# Patient Record
Sex: MALE | Race: WHITE | NOT HISPANIC OR LATINO | URBAN - METROPOLITAN AREA
[De-identification: names, ages, dates, MRNs, and addresses within clinical notes are randomized per-mention and may not be internally consistent; named-entity substitution may affect disease eponyms.]

---

## 2017-02-14 ENCOUNTER — OFFICE VISIT - RIVER FALLS (OUTPATIENT)
Dept: FAMILY MEDICINE | Facility: CLINIC | Age: 42
End: 2017-02-14

## 2017-02-14 ASSESSMENT — MIFFLIN-ST. JEOR: SCORE: 2198.96

## 2017-05-08 ENCOUNTER — COMMUNICATION - RIVER FALLS (OUTPATIENT)
Dept: FAMILY MEDICINE | Facility: CLINIC | Age: 42
End: 2017-05-08

## 2017-05-08 ENCOUNTER — OFFICE VISIT - RIVER FALLS (OUTPATIENT)
Dept: FAMILY MEDICINE | Facility: CLINIC | Age: 42
End: 2017-05-08

## 2017-05-19 ENCOUNTER — OFFICE VISIT - RIVER FALLS (OUTPATIENT)
Dept: FAMILY MEDICINE | Facility: CLINIC | Age: 42
End: 2017-05-19

## 2017-05-19 ENCOUNTER — COMMUNICATION - RIVER FALLS (OUTPATIENT)
Dept: FAMILY MEDICINE | Facility: CLINIC | Age: 42
End: 2017-05-19

## 2017-05-19 LAB
CREAT SERPL-MCNC: 0.93 MG/DL (ref 0.72–1.25)
GLUCOSE BLD-MCNC: 104 MG/DL (ref 65–100)

## 2017-05-19 ASSESSMENT — MIFFLIN-ST. JEOR: SCORE: 2147.25

## 2017-06-28 ENCOUNTER — OFFICE VISIT - RIVER FALLS (OUTPATIENT)
Dept: FAMILY MEDICINE | Facility: CLINIC | Age: 42
End: 2017-06-28

## 2017-08-02 ENCOUNTER — OFFICE VISIT - RIVER FALLS (OUTPATIENT)
Dept: FAMILY MEDICINE | Facility: CLINIC | Age: 42
End: 2017-08-02

## 2017-08-02 ASSESSMENT — MIFFLIN-ST. JEOR: SCORE: 2149.06

## 2017-08-30 ENCOUNTER — OFFICE VISIT - RIVER FALLS (OUTPATIENT)
Dept: FAMILY MEDICINE | Facility: CLINIC | Age: 42
End: 2017-08-30

## 2017-09-05 ENCOUNTER — OFFICE VISIT - RIVER FALLS (OUTPATIENT)
Dept: FAMILY MEDICINE | Facility: CLINIC | Age: 42
End: 2017-09-05

## 2017-09-05 ASSESSMENT — MIFFLIN-ST. JEOR: SCORE: 2147.25

## 2017-09-19 ENCOUNTER — OFFICE VISIT - RIVER FALLS (OUTPATIENT)
Dept: FAMILY MEDICINE | Facility: CLINIC | Age: 42
End: 2017-09-19

## 2017-09-19 ASSESSMENT — MIFFLIN-ST. JEOR: SCORE: 2147.25

## 2017-10-10 ENCOUNTER — OFFICE VISIT - RIVER FALLS (OUTPATIENT)
Dept: FAMILY MEDICINE | Facility: CLINIC | Age: 42
End: 2017-10-10

## 2017-10-10 ASSESSMENT — MIFFLIN-ST. JEOR: SCORE: 2147.25

## 2017-12-07 ENCOUNTER — OFFICE VISIT - RIVER FALLS (OUTPATIENT)
Dept: FAMILY MEDICINE | Facility: CLINIC | Age: 42
End: 2017-12-07

## 2017-12-07 ASSESSMENT — MIFFLIN-ST. JEOR: SCORE: 2139.08

## 2018-03-05 ENCOUNTER — OFFICE VISIT - RIVER FALLS (OUTPATIENT)
Dept: FAMILY MEDICINE | Facility: CLINIC | Age: 43
End: 2018-03-05

## 2018-03-05 ASSESSMENT — MIFFLIN-ST. JEOR: SCORE: 2179

## 2018-05-01 ENCOUNTER — OFFICE VISIT - RIVER FALLS (OUTPATIENT)
Dept: FAMILY MEDICINE | Facility: CLINIC | Age: 43
End: 2018-05-01

## 2018-05-01 ASSESSMENT — MIFFLIN-ST. JEOR: SCORE: 2191.7

## 2018-06-28 ENCOUNTER — OFFICE VISIT - RIVER FALLS (OUTPATIENT)
Dept: FAMILY MEDICINE | Facility: CLINIC | Age: 43
End: 2018-06-28

## 2018-06-28 ASSESSMENT — MIFFLIN-ST. JEOR: SCORE: 2207.12

## 2018-10-01 ENCOUNTER — OFFICE VISIT - RIVER FALLS (OUTPATIENT)
Dept: FAMILY MEDICINE | Facility: CLINIC | Age: 43
End: 2018-10-01

## 2018-10-01 ASSESSMENT — MIFFLIN-ST. JEOR: SCORE: 2207.12

## 2018-12-06 ENCOUNTER — OFFICE VISIT - RIVER FALLS (OUTPATIENT)
Dept: FAMILY MEDICINE | Facility: CLINIC | Age: 43
End: 2018-12-06

## 2018-12-06 ASSESSMENT — MIFFLIN-ST. JEOR: SCORE: 2244.32

## 2019-04-09 ENCOUNTER — OFFICE VISIT - RIVER FALLS (OUTPATIENT)
Dept: FAMILY MEDICINE | Facility: CLINIC | Age: 44
End: 2019-04-09

## 2019-04-09 ASSESSMENT — MIFFLIN-ST. JEOR: SCORE: 2244.32

## 2019-04-10 LAB
A/G RATIO - HISTORICAL: 1.4 (ref 1–2.5)
ALBUMIN SERPL-MCNC: 4.1 GM/DL (ref 3.6–5.1)
ALP SERPL-CCNC: 62 UNIT/L (ref 40–115)
ALT SERPL W P-5'-P-CCNC: 12 UNIT/L (ref 9–46)
AST SERPL W P-5'-P-CCNC: 16 UNIT/L (ref 10–40)
BILIRUB SERPL-MCNC: 0.8 MG/DL (ref 0.2–1.2)
BUN SERPL-MCNC: 12 MG/DL (ref 7–25)
BUN/CREAT RATIO - HISTORICAL: NORMAL (ref 6–22)
CALCIUM SERPL-MCNC: 9.5 MG/DL (ref 8.6–10.3)
CHLORIDE BLD-SCNC: 104 MMOL/L (ref 98–110)
CHOLEST SERPL-MCNC: 160 MG/DL
CHOLEST/HDLC SERPL: 4.8 {RATIO}
CO2 SERPL-SCNC: 23 MMOL/L (ref 20–32)
CREAT SERPL-MCNC: 0.88 MG/DL (ref 0.6–1.35)
EGFRCR SERPLBLD CKD-EPI 2021: 105 ML/MIN/1.73M2
ERYTHROCYTE [DISTWIDTH] IN BLOOD BY AUTOMATED COUNT: 16.1 % (ref 11–15)
GLOBULIN: 2.9 (ref 1.9–3.7)
GLUCOSE BLD-MCNC: 93 MG/DL (ref 65–99)
HBA1C MFR BLD: 5.4 %
HCT VFR BLD AUTO: 49.6 % (ref 38.5–50)
HDLC SERPL-MCNC: 33 MG/DL
HGB BLD-MCNC: 15.3 GM/DL (ref 13.2–17.1)
LDLC SERPL CALC-MCNC: 97 MG/DL
MCH RBC QN AUTO: 24.8 PG (ref 27–33)
MCHC RBC AUTO-ENTMCNC: 30.8 GM/DL (ref 32–36)
MCV RBC AUTO: 80.5 FL (ref 80–100)
NONHDLC SERPL-MCNC: 127 MG/DL
PLATELET # BLD AUTO: 350 10*3/UL (ref 140–400)
PMV BLD: 10.7 FL (ref 7.5–12.5)
POTASSIUM BLD-SCNC: 4.5 MMOL/L (ref 3.5–5.3)
PROT SERPL-MCNC: 7 GM/DL (ref 6.1–8.1)
RBC # BLD AUTO: 6.16 10*6/UL (ref 4.2–5.8)
SODIUM SERPL-SCNC: 137 MMOL/L (ref 135–146)
TRIGL SERPL-MCNC: 205 MG/DL
TSH SERPL DL<=0.005 MIU/L-ACNC: 3.83 MIU/L (ref 0.4–4.5)
WBC # BLD AUTO: 7.3 10*3/UL (ref 3.8–10.8)

## 2019-06-13 ENCOUNTER — OFFICE VISIT - RIVER FALLS (OUTPATIENT)
Dept: FAMILY MEDICINE | Facility: CLINIC | Age: 44
End: 2019-06-13

## 2019-06-13 ASSESSMENT — MIFFLIN-ST. JEOR: SCORE: 2193.52

## 2019-07-01 ENCOUNTER — COMMUNICATION - RIVER FALLS (OUTPATIENT)
Dept: FAMILY MEDICINE | Facility: CLINIC | Age: 44
End: 2019-07-01

## 2019-07-22 ENCOUNTER — COMMUNICATION - RIVER FALLS (OUTPATIENT)
Dept: FAMILY MEDICINE | Facility: CLINIC | Age: 44
End: 2019-07-22

## 2019-07-29 ENCOUNTER — COMMUNICATION - RIVER FALLS (OUTPATIENT)
Dept: FAMILY MEDICINE | Facility: CLINIC | Age: 44
End: 2019-07-29

## 2019-08-28 ENCOUNTER — OFFICE VISIT - RIVER FALLS (OUTPATIENT)
Dept: FAMILY MEDICINE | Facility: CLINIC | Age: 44
End: 2019-08-28

## 2019-08-28 ASSESSMENT — MIFFLIN-ST. JEOR: SCORE: 2192.61

## 2019-09-05 ENCOUNTER — OFFICE VISIT - RIVER FALLS (OUTPATIENT)
Dept: FAMILY MEDICINE | Facility: CLINIC | Age: 44
End: 2019-09-05

## 2019-09-05 ASSESSMENT — MIFFLIN-ST. JEOR: SCORE: 2192.61

## 2019-11-01 ENCOUNTER — COMMUNICATION - RIVER FALLS (OUTPATIENT)
Dept: FAMILY MEDICINE | Facility: CLINIC | Age: 44
End: 2019-11-01

## 2019-11-07 ENCOUNTER — AMBULATORY - RIVER FALLS (OUTPATIENT)
Dept: FAMILY MEDICINE | Facility: CLINIC | Age: 44
End: 2019-11-07

## 2019-11-08 LAB
A/G RATIO - HISTORICAL: 1.4 (ref 1–2.5)
ALBUMIN SERPL-MCNC: 4.2 GM/DL (ref 3.6–5.1)
ALP SERPL-CCNC: 70 UNIT/L (ref 40–115)
ALT SERPL W P-5'-P-CCNC: 15 UNIT/L (ref 9–46)
AST SERPL W P-5'-P-CCNC: 15 UNIT/L (ref 10–40)
BILIRUB SERPL-MCNC: 1 MG/DL (ref 0.2–1.2)
BUN SERPL-MCNC: 12 MG/DL (ref 7–25)
BUN/CREAT RATIO - HISTORICAL: NORMAL (ref 6–22)
CALCIUM SERPL-MCNC: 9.2 MG/DL (ref 8.6–10.3)
CHLORIDE BLD-SCNC: 100 MMOL/L (ref 98–110)
CHOLEST SERPL-MCNC: 119 MG/DL
CHOLEST/HDLC SERPL: 3.1 {RATIO}
CO2 SERPL-SCNC: 27 MMOL/L (ref 20–32)
CREAT SERPL-MCNC: 1.05 MG/DL (ref 0.6–1.35)
EGFRCR SERPLBLD CKD-EPI 2021: 87 ML/MIN/1.73M2
ERYTHROCYTE [DISTWIDTH] IN BLOOD BY AUTOMATED COUNT: 13.9 % (ref 11–15)
GLOBULIN: 3 (ref 1.9–3.7)
GLUCOSE BLD-MCNC: 99 MG/DL (ref 65–99)
HBA1C MFR BLD: 5.9 %
HCT VFR BLD AUTO: 52.7 % (ref 38.5–50)
HDLC SERPL-MCNC: 39 MG/DL
HGB BLD-MCNC: 17.2 GM/DL (ref 13.2–17.1)
LDLC SERPL CALC-MCNC: 60 MG/DL
MCH RBC QN AUTO: 27.6 PG (ref 27–33)
MCHC RBC AUTO-ENTMCNC: 32.6 GM/DL (ref 32–36)
MCV RBC AUTO: 84.5 FL (ref 80–100)
NONHDLC SERPL-MCNC: 80 MG/DL
PLATELET # BLD AUTO: 356 10*3/UL (ref 140–400)
PMV BLD: 10.4 FL (ref 7.5–12.5)
POTASSIUM BLD-SCNC: 4.5 MMOL/L (ref 3.5–5.3)
PROT SERPL-MCNC: 7.2 GM/DL (ref 6.1–8.1)
RBC # BLD AUTO: 6.24 10*6/UL (ref 4.2–5.8)
SODIUM SERPL-SCNC: 139 MMOL/L (ref 135–146)
TRIGL SERPL-MCNC: 118 MG/DL
TSH SERPL DL<=0.005 MIU/L-ACNC: 4.11 MIU/L (ref 0.4–4.5)
WBC # BLD AUTO: 8.2 10*3/UL (ref 3.8–10.8)

## 2019-11-11 ENCOUNTER — COMMUNICATION - RIVER FALLS (OUTPATIENT)
Dept: FAMILY MEDICINE | Facility: CLINIC | Age: 44
End: 2019-11-11

## 2019-11-18 ENCOUNTER — OFFICE VISIT - RIVER FALLS (OUTPATIENT)
Dept: FAMILY MEDICINE | Facility: CLINIC | Age: 44
End: 2019-11-18

## 2019-11-18 ASSESSMENT — MIFFLIN-ST. JEOR: SCORE: 2192.61

## 2019-12-09 ENCOUNTER — OFFICE VISIT - RIVER FALLS (OUTPATIENT)
Dept: FAMILY MEDICINE | Facility: CLINIC | Age: 44
End: 2019-12-09

## 2019-12-09 ASSESSMENT — MIFFLIN-ST. JEOR: SCORE: 2192.61

## 2019-12-15 ENCOUNTER — AMBULATORY - RIVER FALLS (OUTPATIENT)
Dept: FAMILY MEDICINE | Facility: CLINIC | Age: 44
End: 2019-12-15

## 2020-01-15 ENCOUNTER — COMMUNICATION - RIVER FALLS (OUTPATIENT)
Dept: FAMILY MEDICINE | Facility: CLINIC | Age: 45
End: 2020-01-15

## 2020-04-23 ENCOUNTER — OFFICE VISIT - RIVER FALLS (OUTPATIENT)
Dept: FAMILY MEDICINE | Facility: CLINIC | Age: 45
End: 2020-04-23

## 2020-08-10 ENCOUNTER — COMMUNICATION - RIVER FALLS (OUTPATIENT)
Dept: FAMILY MEDICINE | Facility: CLINIC | Age: 45
End: 2020-08-10

## 2020-08-12 ENCOUNTER — OFFICE VISIT - RIVER FALLS (OUTPATIENT)
Dept: FAMILY MEDICINE | Facility: CLINIC | Age: 45
End: 2020-08-12

## 2020-08-13 LAB
A/G RATIO - HISTORICAL: 1.4 (ref 1–2.5)
ALBUMIN SERPL-MCNC: 4.1 GM/DL (ref 3.6–5.1)
ALP SERPL-CCNC: 74 UNIT/L (ref 36–130)
ALT SERPL W P-5'-P-CCNC: 19 UNIT/L (ref 9–46)
AST SERPL W P-5'-P-CCNC: 16 UNIT/L (ref 10–40)
BILIRUB SERPL-MCNC: 1 MG/DL (ref 0.2–1.2)
BUN SERPL-MCNC: 12 MG/DL (ref 7–25)
BUN/CREAT RATIO - HISTORICAL: NORMAL (ref 6–22)
CALCIUM SERPL-MCNC: 9.1 MG/DL (ref 8.6–10.3)
CHLORIDE BLD-SCNC: 105 MMOL/L (ref 98–110)
CHOLEST SERPL-MCNC: 100 MG/DL
CHOLEST/HDLC SERPL: 2.4 {RATIO}
CO2 SERPL-SCNC: 26 MMOL/L (ref 20–32)
CREAT SERPL-MCNC: 0.96 MG/DL (ref 0.6–1.35)
EGFRCR SERPLBLD CKD-EPI 2021: 96 ML/MIN/1.73M2
ERYTHROCYTE [DISTWIDTH] IN BLOOD BY AUTOMATED COUNT: 14.3 % (ref 11–15)
GLOBULIN: 2.9 (ref 1.9–3.7)
GLUCOSE BLD-MCNC: 98 MG/DL (ref 65–99)
HBA1C MFR BLD: 5.6 %
HCT VFR BLD AUTO: 51.3 % (ref 38.5–50)
HDLC SERPL-MCNC: 42 MG/DL
HGB BLD-MCNC: 16.2 GM/DL (ref 13.2–17.1)
LDLC SERPL CALC-MCNC: 38 MG/DL
MCH RBC QN AUTO: 27.1 PG (ref 27–33)
MCHC RBC AUTO-ENTMCNC: 31.6 GM/DL (ref 32–36)
MCV RBC AUTO: 85.8 FL (ref 80–100)
NONHDLC SERPL-MCNC: 58 MG/DL
PLATELET # BLD AUTO: 342 10*3/UL (ref 140–400)
PMV BLD: 10.3 FL (ref 7.5–12.5)
POTASSIUM BLD-SCNC: 4.5 MMOL/L (ref 3.5–5.3)
PROT SERPL-MCNC: 7 GM/DL (ref 6.1–8.1)
RBC # BLD AUTO: 5.98 10*6/UL (ref 4.2–5.8)
SODIUM SERPL-SCNC: 139 MMOL/L (ref 135–146)
TESTOSTERONE TOTAL: 1123 NG/DL (ref 250–827)
TRIGL SERPL-MCNC: 113 MG/DL
TSH SERPL DL<=0.005 MIU/L-ACNC: 3.78 MIU/L (ref 0.4–4.5)
WBC # BLD AUTO: 7.4 10*3/UL (ref 3.8–10.8)

## 2020-08-17 ENCOUNTER — COMMUNICATION - RIVER FALLS (OUTPATIENT)
Dept: FAMILY MEDICINE | Facility: CLINIC | Age: 45
End: 2020-08-17

## 2020-10-21 ENCOUNTER — OFFICE VISIT - RIVER FALLS (OUTPATIENT)
Dept: FAMILY MEDICINE | Facility: CLINIC | Age: 45
End: 2020-10-21

## 2020-11-09 ENCOUNTER — COMMUNICATION - RIVER FALLS (OUTPATIENT)
Dept: FAMILY MEDICINE | Facility: CLINIC | Age: 45
End: 2020-11-09

## 2020-12-14 ENCOUNTER — OFFICE VISIT - RIVER FALLS (OUTPATIENT)
Dept: FAMILY MEDICINE | Facility: CLINIC | Age: 45
End: 2020-12-14

## 2020-12-14 ASSESSMENT — MIFFLIN-ST. JEOR: SCORE: 2192.61

## 2021-02-01 ENCOUNTER — COMMUNICATION - RIVER FALLS (OUTPATIENT)
Dept: FAMILY MEDICINE | Facility: CLINIC | Age: 46
End: 2021-02-01

## 2021-02-10 ENCOUNTER — AMBULATORY - RIVER FALLS (OUTPATIENT)
Dept: FAMILY MEDICINE | Facility: CLINIC | Age: 46
End: 2021-02-10

## 2021-02-11 ENCOUNTER — COMMUNICATION - RIVER FALLS (OUTPATIENT)
Dept: FAMILY MEDICINE | Facility: CLINIC | Age: 46
End: 2021-02-11

## 2021-02-11 LAB
A/G RATIO - HISTORICAL: 1.4 (ref 1–2.5)
ALBUMIN SERPL-MCNC: 4.4 GM/DL (ref 3.6–5.1)
ALP SERPL-CCNC: 91 UNIT/L (ref 36–130)
ALT SERPL W P-5'-P-CCNC: 26 UNIT/L (ref 9–46)
AST SERPL W P-5'-P-CCNC: 19 UNIT/L (ref 10–40)
BILIRUB SERPL-MCNC: 1.1 MG/DL (ref 0.2–1.2)
BUN SERPL-MCNC: 16 MG/DL (ref 7–25)
BUN/CREAT RATIO - HISTORICAL: NORMAL (ref 6–22)
CALCIUM SERPL-MCNC: 9.6 MG/DL (ref 8.6–10.3)
CHLORIDE BLD-SCNC: 100 MMOL/L (ref 98–110)
CHOLEST SERPL-MCNC: 138 MG/DL
CHOLEST/HDLC SERPL: 2.8 {RATIO}
CO2 SERPL-SCNC: 30 MMOL/L (ref 20–32)
CREAT SERPL-MCNC: 1.13 MG/DL (ref 0.6–1.35)
EGFRCR SERPLBLD CKD-EPI 2021: 78 ML/MIN/1.73M2
ERYTHROCYTE [DISTWIDTH] IN BLOOD BY AUTOMATED COUNT: 14.7 % (ref 11–15)
GLOBULIN: 3.1 (ref 1.9–3.7)
GLUCOSE BLD-MCNC: 94 MG/DL (ref 65–99)
HCT VFR BLD AUTO: 48 % (ref 38.5–50)
HDLC SERPL-MCNC: 49 MG/DL
HGB BLD-MCNC: 16.4 GM/DL (ref 13.2–17.1)
LDLC SERPL CALC-MCNC: 68 MG/DL
MCH RBC QN AUTO: 28.8 PG (ref 27–33)
MCHC RBC AUTO-ENTMCNC: 34.2 GM/DL (ref 32–36)
MCV RBC AUTO: 84.2 FL (ref 80–100)
NONHDLC SERPL-MCNC: 89 MG/DL
PLATELET # BLD AUTO: 332 10*3/UL (ref 140–400)
PMV BLD: 9.8 FL (ref 7.5–12.5)
POTASSIUM BLD-SCNC: 4.5 MMOL/L (ref 3.5–5.3)
PROT SERPL-MCNC: 7.5 GM/DL (ref 6.1–8.1)
RBC # BLD AUTO: 5.7 10*6/UL (ref 4.2–5.8)
SODIUM SERPL-SCNC: 138 MMOL/L (ref 135–146)
TESTOSTERONE TOTAL: 191 NG/DL (ref 250–827)
TRIGL SERPL-MCNC: 120 MG/DL
TSH SERPL DL<=0.005 MIU/L-ACNC: 3.16 MIU/L (ref 0.4–4.5)
WBC # BLD AUTO: 6.6 10*3/UL (ref 3.8–10.8)

## 2021-02-16 LAB
CREAT UR-MCNC: 137 MG/DL (ref 20–320)
MICROALBUMIN UR-MCNC: 0.3 MG/DL
MICROALBUMIN/CREAT UR: 2 MG/G{CREAT}

## 2021-02-18 ENCOUNTER — OFFICE VISIT - RIVER FALLS (OUTPATIENT)
Dept: FAMILY MEDICINE | Facility: CLINIC | Age: 46
End: 2021-02-18

## 2021-02-18 ASSESSMENT — MIFFLIN-ST. JEOR: SCORE: 2192.61

## 2021-04-28 ENCOUNTER — COMMUNICATION - RIVER FALLS (OUTPATIENT)
Dept: FAMILY MEDICINE | Facility: CLINIC | Age: 46
End: 2021-04-28

## 2022-02-12 VITALS
DIASTOLIC BLOOD PRESSURE: 88 MMHG | SYSTOLIC BLOOD PRESSURE: 130 MMHG | BODY MASS INDEX: 41.26 KG/M2 | WEIGHT: 288.2 LBS | HEART RATE: 93 BPM | HEIGHT: 70 IN | OXYGEN SATURATION: 98 %

## 2022-02-12 VITALS
BODY MASS INDEX: 39.8 KG/M2 | HEIGHT: 70 IN | WEIGHT: 278 LBS | SYSTOLIC BLOOD PRESSURE: 128 MMHG | BODY MASS INDEX: 39.8 KG/M2 | TEMPERATURE: 97.7 F | WEIGHT: 278 LBS | DIASTOLIC BLOOD PRESSURE: 76 MMHG | HEIGHT: 70 IN | HEIGHT: 70 IN | SYSTOLIC BLOOD PRESSURE: 134 MMHG | BODY MASS INDEX: 39.8 KG/M2 | HEART RATE: 72 BPM | HEART RATE: 110 BPM | OXYGEN SATURATION: 97 % | WEIGHT: 278 LBS | TEMPERATURE: 97.6 F | HEART RATE: 84 BPM | TEMPERATURE: 98.1 F | DIASTOLIC BLOOD PRESSURE: 82 MMHG

## 2022-02-12 VITALS
WEIGHT: 287.8 LBS | HEIGHT: 70 IN | BODY MASS INDEX: 41.2 KG/M2 | HEIGHT: 70 IN | DIASTOLIC BLOOD PRESSURE: 72 MMHG | HEART RATE: 78 BPM | HEART RATE: 83 BPM | BODY MASS INDEX: 40.8 KG/M2 | SYSTOLIC BLOOD PRESSURE: 110 MMHG | SYSTOLIC BLOOD PRESSURE: 124 MMHG | WEIGHT: 285 LBS | DIASTOLIC BLOOD PRESSURE: 70 MMHG

## 2022-02-12 VITALS
BODY MASS INDEX: 42.86 KG/M2 | DIASTOLIC BLOOD PRESSURE: 84 MMHG | HEIGHT: 70 IN | HEART RATE: 111 BPM | OXYGEN SATURATION: 96 % | SYSTOLIC BLOOD PRESSURE: 128 MMHG | WEIGHT: 299.4 LBS

## 2022-02-12 VITALS
SYSTOLIC BLOOD PRESSURE: 128 MMHG | DIASTOLIC BLOOD PRESSURE: 72 MMHG | HEART RATE: 92 BPM | TEMPERATURE: 98.5 F | SYSTOLIC BLOOD PRESSURE: 126 MMHG | TEMPERATURE: 99 F | OXYGEN SATURATION: 98 % | HEIGHT: 70 IN | HEART RATE: 88 BPM | SYSTOLIC BLOOD PRESSURE: 120 MMHG | BODY MASS INDEX: 39.89 KG/M2 | HEART RATE: 72 BPM | WEIGHT: 277.8 LBS | WEIGHT: 278 LBS | DIASTOLIC BLOOD PRESSURE: 86 MMHG | BODY MASS INDEX: 39.86 KG/M2 | DIASTOLIC BLOOD PRESSURE: 66 MMHG | WEIGHT: 278.4 LBS

## 2022-02-12 VITALS
HEIGHT: 70 IN | DIASTOLIC BLOOD PRESSURE: 86 MMHG | HEART RATE: 125 BPM | WEIGHT: 288 LBS | BODY MASS INDEX: 41.23 KG/M2 | SYSTOLIC BLOOD PRESSURE: 138 MMHG | DIASTOLIC BLOOD PRESSURE: 78 MMHG | WEIGHT: 288 LBS | HEIGHT: 70 IN | BODY MASS INDEX: 41.23 KG/M2 | HEART RATE: 107 BPM | SYSTOLIC BLOOD PRESSURE: 126 MMHG | BODY MASS INDEX: 41.23 KG/M2 | OXYGEN SATURATION: 90 % | HEIGHT: 70 IN | SYSTOLIC BLOOD PRESSURE: 126 MMHG | DIASTOLIC BLOOD PRESSURE: 88 MMHG | HEART RATE: 128 BPM | WEIGHT: 288 LBS | DIASTOLIC BLOOD PRESSURE: 86 MMHG | OXYGEN SATURATION: 98 % | HEART RATE: 104 BPM | OXYGEN SATURATION: 96 % | SYSTOLIC BLOOD PRESSURE: 148 MMHG

## 2022-02-12 VITALS
BODY MASS INDEX: 41.43 KG/M2 | SYSTOLIC BLOOD PRESSURE: 142 MMHG | WEIGHT: 289.4 LBS | HEIGHT: 70 IN | DIASTOLIC BLOOD PRESSURE: 90 MMHG | HEART RATE: 112 BPM

## 2022-02-12 VITALS
HEART RATE: 76 BPM | SYSTOLIC BLOOD PRESSURE: 130 MMHG | TEMPERATURE: 98.3 F | DIASTOLIC BLOOD PRESSURE: 74 MMHG | BODY MASS INDEX: 40.06 KG/M2 | BODY MASS INDEX: 39.8 KG/M2 | SYSTOLIC BLOOD PRESSURE: 128 MMHG | WEIGHT: 278 LBS | HEIGHT: 70 IN | HEART RATE: 96 BPM | WEIGHT: 279.2 LBS | DIASTOLIC BLOOD PRESSURE: 90 MMHG

## 2022-02-12 VITALS
DIASTOLIC BLOOD PRESSURE: 84 MMHG | SYSTOLIC BLOOD PRESSURE: 138 MMHG | WEIGHT: 288 LBS | BODY MASS INDEX: 41.23 KG/M2 | HEIGHT: 70 IN | OXYGEN SATURATION: 96 % | HEART RATE: 89 BPM

## 2022-02-12 VITALS
DIASTOLIC BLOOD PRESSURE: 88 MMHG | SYSTOLIC BLOOD PRESSURE: 144 MMHG | HEART RATE: 91 BPM | WEIGHT: 288 LBS | OXYGEN SATURATION: 97 % | BODY MASS INDEX: 41.23 KG/M2 | HEIGHT: 70 IN | TEMPERATURE: 98.1 F

## 2022-02-12 VITALS
SYSTOLIC BLOOD PRESSURE: 132 MMHG | HEART RATE: 112 BPM | DIASTOLIC BLOOD PRESSURE: 88 MMHG | BODY MASS INDEX: 41.23 KG/M2 | WEIGHT: 288 LBS | HEIGHT: 70 IN | OXYGEN SATURATION: 96 %

## 2022-02-12 VITALS
HEIGHT: 70 IN | WEIGHT: 291.2 LBS | DIASTOLIC BLOOD PRESSURE: 86 MMHG | OXYGEN SATURATION: 97 % | SYSTOLIC BLOOD PRESSURE: 144 MMHG | HEART RATE: 93 BPM | BODY MASS INDEX: 41.69 KG/M2

## 2022-02-12 VITALS — HEIGHT: 70 IN | BODY MASS INDEX: 41.32 KG/M2

## 2022-02-12 VITALS
DIASTOLIC BLOOD PRESSURE: 88 MMHG | WEIGHT: 291.2 LBS | SYSTOLIC BLOOD PRESSURE: 138 MMHG | HEART RATE: 90 BPM | TEMPERATURE: 98.2 F | HEIGHT: 70 IN | BODY MASS INDEX: 41.69 KG/M2

## 2022-02-12 VITALS — BODY MASS INDEX: 42.86 KG/M2 | OXYGEN SATURATION: 97 % | HEIGHT: 70 IN | WEIGHT: 299.4 LBS | HEART RATE: 86 BPM

## 2022-02-12 VITALS
HEIGHT: 70 IN | HEART RATE: 91 BPM | SYSTOLIC BLOOD PRESSURE: 132 MMHG | WEIGHT: 276.2 LBS | DIASTOLIC BLOOD PRESSURE: 86 MMHG | BODY MASS INDEX: 39.54 KG/M2 | OXYGEN SATURATION: 99 %

## 2022-02-12 VITALS — SYSTOLIC BLOOD PRESSURE: 142 MMHG | HEIGHT: 70 IN | DIASTOLIC BLOOD PRESSURE: 88 MMHG | BODY MASS INDEX: 41.78 KG/M2

## 2022-02-12 VITALS — BODY MASS INDEX: 41.32 KG/M2 | HEIGHT: 70 IN

## 2022-02-15 NOTE — PROGRESS NOTES
Patient:   PEABODY, TIMOTHY J            MRN: [id number]            FIN: [id number]               Age:   44 years     Sex:  Male     :  1975   Associated Diagnoses:   Chronic pain; Hypothyroidism; Insomnia; Low testosterone in male; Migraine Headache; Moderate major depression; Type 2 diabetes mellitus   Author:   Floyd Baker MD      Impression and Plan   Diagnosis     Chronic pain (ELN97-BH G89.29).     Hypothyroidism (PVM76-OW E03.9).     Insomnia (AHI69-YI G47.00).     Low testosterone in male (IMF13-NS R79.89).     Migraine Headache (JGU95-QG G43.909).     Moderate major depression (GYV32-XK F32.1).     Type 2 diabetes mellitus (MPX50-US E11.9).     Course:  Improving.    Plan:    1. Decrease dose of Testosterone due to previous high level and recheck level in 2 months  2. Increase dose of Amitriptyline due to poor sleep  3. Patient reports pain clinic will be placing a spine stimulator.    Orders     Orders   Charges (Evaluation and Management):  19507 office outpatient visit 15 minutes (Charge) (Order): Quantity: 1, Migraine Headache  Moderate major depression  Type 2 diabetes mellitus  Insomnia  Chronic pain  Hypothyroidism  Low testosterone in male.     Orders (Selected)   Prescriptions  Prescribed  DULoxetine 60 mg oral delayed release capsule: = 2 cap(s) ( 120 mg ), Oral, daily, # 180 cap(s), 1 Refill(s), Type: Maintenance, Pharmacy: [facility], 2 cap(s) Oral daily, 70, in, 10/21/20 9:37:00 CDT, Height Measured, 288, lb, 19 13:24:00 CST, Weight Measured  Testosterone Cypionate 200 mg/mL intramuscular solution: See Instructions, Instructions: Inject intramuscular 0.5 milliliter  ONCE weekly, # 10 mL, 1 Refill(s), Type: Soft Stop, Pharmacy: [facility], Inject intramuscular 0.5 milliliter; ONCE weekly, 70, in, 10/21/20 9:37:00 CDT, Height Measured, 288...  amitriptyline 25 mg oral tablet: = 3 tab(s) ( 75 mg ), PO, hs, # 90 tab(s), 5 Refill(s), Type: Maintenance, Pharmacy:  [facility], 3 tab(s) Oral hs, 70, in, 10/21/20 9:37:00 CDT, Height Measured, 288, lb, 12/09/19 13:24:00 CST, Weight Measured  atorvastatin 20 mg oral tablet: = 1 tab(s) ( 20 mg ), PO, Daily, # 90 tab(s), 1 Refill(s), Type: Maintenance, Pharmacy: [facility], 1 tab(s) Oral daily, 70, in, 10/21/20 9:37:00 CDT, Height Measured, 288, lb, 12/09/19 13:24:00 CST, Weight Measured  tiZANidine 4 mg oral tablet: = 1 tab(s) ( 4 mg ), Oral, bid, # 60 tab(s), 1 Refill(s), Type: Maintenance, Pharmacy: [facility], 1 tab(s) Oral bid, 70, in, 10/21/20 9:37:00 CDT, Height Measured, 288, lb, 12/09/19 13:24:00 CST, Weight Measured.        Visit Information      Date of Service: 10/21/2020 06:42 am  Performing Location: [facility]  Encounter#: [id number]      Primary Care Provider (PCP):  Floyd Baker MD# [id number]      Referring Provider:  Floyd Baker MD# [id number]   Visit type:  Video Visit via [employer].    Participants in room during visit:  _Patient only   Accompanied by:  No one.    Source of history:  Self.    Location of patient:  _home  Location of provider:  _ Clinic  Video Start Time:  _1000  Video End Time:   _1015    Today's visit was conducted via video conference due to the COVID-19 pandemic.  The patient's consent to proceed with a video visit has been obtained and documented.   Referral source:  Self.    History limitation:  None.       Chief Complaint   10/21/2020 9:37 AM CDT   consent for video visit for med ck        History of Present Illness   Patient is a _44 year old _M who is being evaluated via a billable video visit.  Patient needs refills of several medications.  He is not sleeping well so Amitriptyline is increased to 75 mg.  His last testosterone level was above the normal range so his dose is decreased and he will recheck the level in two months.  He is going to a pain clinic and will be given a trial of a spinal stimulator.  His depression is well  controlled currently.  Diabetes is well controlled.  Hypothyroidism is well controlled.  Migraines are well controlled.  Cholesterol is well controlled.      Review of Systems   Constitutional:  Negative.    Eye:  Negative.    Ear/Nose/Mouth/Throat:  Negative.    Respiratory:  Negative.    Cardiovascular:  Negative.    Gastrointestinal:  Negative.    Genitourinary:  Negative.    Immunologic:  Negative.    Musculoskeletal:  Negative.    Integumentary:  Negative.    Neurologic:  Negative.    Psychiatric:  Negative.       Health Status   Allergies:    Nonallergic Reactions (Selected)  Severe  Doxycycline (Vomiting)   Medications:  (Selected)   Prescriptions  Prescribed  DULoxetine 60 mg oral delayed release capsule: = 2 cap(s) ( 120 mg ), Oral, daily, # 180 cap(s), 1 Refill(s), Type: Maintenance, Pharmacy: [facility], 2 cap(s) Oral daily, 70, in, 10/21/20 9:37:00 CDT, Height Measured, 288, lb, 12/09/19 13:24:00 CST, Weight Measured  SUMAtriptan 100 mg oral tablet: = 1 tab(s) ( 100 mg ), PO, Once, PRN: for migraine headache, # 9 tab(s), 5 Refill(s), Type: Soft Stop, Pharmacy: [facility], 1 tab(s) Oral once,PRN:for migraine headache  Testosterone Cypionate 200 mg/mL intramuscular solution: See Instructions, Instructions: Inject intramuscular 0.5 milliliter  ONCE weekly, # 10 mL, 1 Refill(s), Type: Soft Stop, Pharmacy: [facility], Inject intramuscular 0.5 milliliter; ONCE weekly, 70, in, 10/21/20 9:37:00 CDT, Height Measured, 288...  amitriptyline 25 mg oral tablet: = 3 tab(s) ( 75 mg ), PO, hs, # 90 tab(s), 5 Refill(s), Type: Maintenance, Pharmacy: [facility], 3 tab(s) Oral hs, 70, in, 10/21/20 9:37:00 CDT, Height Measured, 288, lb, 12/09/19 13:24:00 CST, Weight Measured  atorvastatin 20 mg oral tablet: = 1 tab(s) ( 20 mg ), PO, Daily, # 90 tab(s), 1 Refill(s), Type: Maintenance, Pharmacy: [facility], 1 tab(s) Oral daily, 70, in, 10/21/20 9:37:00 CDT, Height Measured, 288, lb,  12/09/19 13:24:00 CST, Weight Measured  levothyroxine 25 mcg (0.025 mg) oral tablet: = 1 tab(s) ( 25 mcg ), Oral, daily, # 90 tab(s), 1 Refill(s), ARLET, Type: Maintenance, Pharmacy: [facility], 1 tab(s) Oral daily, 70, in, 04/23/20 8:03:00 CDT, Height Measured, 288, lb, 12/09/19 13:24:00 CST, Weight Measured  metFORMIN 500 mg oral tablet: = 1 tab(s), Oral, bid, Instructions: WM., # 180 tab(s), 1 Refill(s), Type: Maintenance, Pharmacy: [facility], 1 tab(s) Oral bid,Instr:WM., 70, in, 08/12/20 9:26:00 CDT, Height Measured, 288, lb, 12/09/19 13:24:00 CST, Weight Measured  oxyCODONE 10 mg oral tablet: = 1 tab(s), Oral, qid, # 120 EA, 0 Refill(s), Type: Soft Stop, Pharmacy: [facility], 1 tab(s) Oral qid, 70, in, 08/12/20 9:26:00 CDT, Height Measured, 288, lb, 12/09/19 13:24:00 CST, Weight Measured  tadalafil 10 mg oral tablet: See Instructions, Instructions: 1 tab(s) Oral daily PRN planned sexual activity, # 10 tab(s), 2 Refill(s), Type: Maintenance, Pharmacy: [facility], 1 tab(s) Oral daily PRN planned sexual activity  tiZANidine 4 mg oral tablet: = 1 tab(s) ( 4 mg ), Oral, bid, # 60 tab(s), 1 Refill(s), Type: Maintenance, Pharmacy: [facility], 1 tab(s) Oral bid, 70, in, 10/21/20 9:37:00 CDT, Height Measured, 288, lb, 12/09/19 13:24:00 CST, Weight Measured,    Medications          *denotes recorded medication          DULoxetine 60 mg oral delayed release capsule: 120 mg, 2 cap(s), Oral, daily, 180 cap(s), 1 Refill(s).          SUMAtriptan 100 mg oral tablet: 100 mg, 1 tab(s), PO, Once, PRN: for migraine headache, 9 tab(s), 5 Refill(s).          amitriptyline 25 mg oral tablet: 75 mg, 3 tab(s), PO, hs, 90 tab(s), 5 Refill(s).          atorvastatin 20 mg oral tablet: 20 mg, 1 tab(s), PO, Daily, 90 tab(s), 1 Refill(s).          levothyroxine 25 mcg (0.025 mg) oral tablet: 25 mcg, 1 tab(s), Oral, daily, 90 tab(s), 1 Refill(s).          metFORMIN 500 mg oral tablet: 1 tab(s), Oral,  bid, WM., 180 tab(s), 1 Refill(s).          oxyCODONE 10 mg oral tablet: 1 tab(s), Oral, qid, 120 EA, 0 Refill(s).          tadalafil 10 mg oral tablet: See Instructions, 1 tab(s) Oral daily PRN planned sexual activity, 10 tab(s), 2 Refill(s).          Testosterone Cypionate 200 mg/mL intramuscular solution: See Instructions, Inject intramuscular 0.5 milliliter  ONCE weekly, 10 mL, 1 Refill(s).          tiZANidine 4 mg oral tablet: 4 mg, 1 tab(s), Oral, bid, 60 tab(s), 1 Refill(s).       Problem list:    All Problems  Benign essential HTN / SNOMED CT 6332873 / Confirmed  Benign familial tremor / SNOMED CT 6615267455 / Confirmed  Cartilage tear / ICD-9-.9 / Confirmed  Chronic Lumbar Pain / ICD-9-.2 / Confirmed  Chronic pain / SNOMED CT 604472928 / Confirmed  Hypothyroidism / SNOMED CT 50518236 / Confirmed  Insomnia / ICD-9-.52 / Confirmed  Low testosterone in male / SNOMED CT 709183377 / Confirmed  Migraine Headache / ICD-9-.90 / Confirmed  Mild Episode of Depression / ICD-9-.31 / Confirmed  Moderate major depression / SNOMED CT 2268390 / Confirmed  Morbid obesity / SNOMED CT 117940510 / Confirmed  Obesity / ICD-9-.00 / Probable  Onychomycosis / SNOMED CT 5604023229 / Confirmed  PUD (Peptic Ulcer Disease) / ICD-9-.90 / Confirmed  Type 2 diabetes mellitus / SNOMED CT 471222062 / Confirmed      Histories   Past Medical History:    Active  PUD (Peptic Ulcer Disease) (533.90)  Chronic Lumbar Pain (724.2)  Onychomycosis (7541693105)  Mild Episode of Depression (296.31)  Obesity (278.00)  Cartilage tear (848.9)  Migraine Headache (346.90)  Insomnia (780.52)  Resolved  *Hospitalized@[facility] - Pneumonia: Onset on 2/14/2015 at 39 years.  Resolved on 2/19/2015 at 39 years.  Tobacco user (305.1):  Resolved.   Family History:       Procedure history:    Spinal fusion (SNOMED CT 63057107) performed by Freddy Duvall MD on 8/22/2017 at 41 Years.  Comments:  8/31/2017 8:02 AM DERRICK - Prem ,  Ashley  L4-5.  Fusion L5-S1 in 2015 at 40 Years.  Comments:  2/3/2015 8:49 AM Floyd Raines MD  02/05/2015  [facility]  Dr. Rios  Bilateral L5-S1 Decompression Foramina in 2014 at 39 Years.  Comments:  4/29/2014 3:20 PM Floyd Mayo MD, [facility]  [facility]  05/06/2014    Vasectomy (SNOMED CT 47188947) on 7/26/2012 at 36 Years.  Arthroscopy (SNOMED CT 61095190) in 2011 at 36 Years.  Comments:  7/11/2012 8:59 AM Miracle White  Bilateral knee  Polysomnogram (SNOMED CT 208560478) on 8/14/2009 at 33 Years.  Comments:  7/11/2012 9:09 AM Miracle White  No significant sleep-disorder findings.  Bruxism.  Follow-up indicated.  Tonsillectomy (SNOMED CT 750762205) in 1980 at 5 Years.   Social History:        Alcohol Assessment: Denies Alcohol Use            Never      Tobacco Assessment: Past            Past, Cigarettes, 4 per day.                     Comments:                      04/29/2014 - Floyd Baker MD                     Quit 04/01/2014      Substance Abuse Assessment            Never      Employment and Education Assessment            Employed                     Comments:                      04/29/2014 - Floyd Baker MD                     [employer]      Home and Environment Assessment            Marital status:  (Living together).  Lives with Self, Children, Spouse.  Living situation:               Home/Independent.      Exercise and Physical Activity Assessment: Does not exercise            Exercise frequency: No regular exercise..        Physical Examination   General:  Alert and oriented, No acute distress.    Eye:  Pupils are equal, round and reactive to light, Extraocular movements are intact, Normal conjunctiva.    Respiratory:  Respirations are non-labored.    Integumentary:  Warm, Dry, Pink.    Neurologic:  Normal motor function.    Psychiatric:  Cooperative, Appropriate mood & affect, Normal judgment, Non-suicidal.         Mood and  affect: Calm.         Behavior: Relaxed.         Judgment: Able to make sensible decisions, Appropriate in social situations.         Thought process: Appropriate.       Health Maintenance      Recommendations     Pending (in the next year)        OverDue           Depression Screen (Male) due  06/28/18  and every 1  year(s)           DM - Foot Exam due  06/13/20  and every 1  year(s)           Influenza Vaccine due  08/31/20  and every 1  year(s)        Due            DM - Communication with Managing Provider due  10/21/20  and every 1  year(s)           DM - Microalbumin due  10/21/20  and every 1  year(s)        Near Due            DM - HgbA1c near due  11/12/20  and every 3  month(s)           Body Mass Index Check (Male) near due  12/09/20  and every 1  year(s)        Due In Future            DM - Eye Exam not due until  01/27/21  and every 1  year(s)           High Blood Pressure Screen (Male) not due until  08/12/21  and every 1  year(s)           Lipid Disorders Screen (Male) not due until  08/12/21  and every 1  year(s)           Type 2 Diabetes Mellitus Screen (Male) not due until  08/12/21  and every 1  year(s)     Satisfied (in the past 1 year)        Satisfied            Body Mass Index Check (Male) on  12/09/19.           Body Mass Index Check (Male) on  11/18/19.           DM - Eye Exam on  01/27/20.           DM - Eye Exam on  01/27/20.           DM - Eye Exam on  01/27/20.           DM - Eye Exam on  01/27/20.           DM - Eye Exam on  01/27/20.           DM - HgbA1c on  08/12/20.           DM - HgbA1c on  11/07/19.           High Blood Pressure Screen (Male) on  08/12/20.           High Blood Pressure Screen (Male) on  12/09/19.           High Blood Pressure Screen (Male) on  11/18/19.           High Blood Pressure Screen (Male) on  11/18/19.           Influenza Vaccine on  12/15/19.           Lipid Disorders Screen (Male) on  08/12/20.           Lipid Disorders Screen (Male) on  08/12/20.            Lipid Disorders Screen (Male) on  08/12/20.           Lipid Disorders Screen (Male) on  08/12/20.           Lipid Disorders Screen (Male) on  11/07/19.           Lipid Disorders Screen (Male) on  11/07/19.           Lipid Disorders Screen (Male) on  11/07/19.           Lipid Disorders Screen (Male) on  11/07/19.           Obesity Screen and Counseling (Male) on  12/09/19.           Obesity Screen and Counseling (Male) on  11/18/19.           Tobacco Use Screen (Male) on  10/21/20.           Tobacco Use Screen (Male) on  08/12/20.           Tobacco Use Screen (Male) on  04/23/20.           Tobacco Use Screen (Male) on  12/09/19.           Tobacco Use Screen (Male) on  11/18/19.           Type 2 Diabetes Mellitus Screen (Male) on  08/12/20.           Type 2 Diabetes Mellitus Screen (Male) on  11/07/19.

## 2022-02-15 NOTE — LETTER
(Inserted Image. Unable to display)   [address] [id number]  November 11, 2019      TIMOTHY PEABODY  [address] [place], [place] [id number]        Dear DAVID,     Thank you for selecting [facility] for your healthcare needs. Below you will find the results of the recent tests done at our clinic.        All results are within acceptable limits. No treatment changes are recommended at this time.      Result Name Current Result Previous Result Reference Range   Sodium Level (mmol/L)  139 11/7/2019  137 4/9/2019 135 - 146   Potassium Level (mmol/L)  4.5 11/7/2019  4.5 4/9/2019 3.5 - 5.3   Chloride Level (mmol/L)  100 11/7/2019  104 4/9/2019 98 - 110   CO2 Level (mmol/L)  27 11/7/2019  23 4/9/2019 20 - 32   Glucose Level (mg/dL)  99 11/7/2019  93 4/9/2019 65 - 99   BUN (mg/dL)  12 11/7/2019  12 4/9/2019 7 - 25   Creatinine Level (mg/dL)  1.05 11/7/2019  0.88 4/9/2019 0.60 - 1.35   BUN/Creat Ratio  NOT APPLICABLE 11/7/2019  NOT APPLICABLE 4/9/2019 6 - 22   eGFR (mL/min/1.73m2)  87 11/7/2019  105 4/9/2019 > OR = 60 -    eGFR  (mL/min/1.73m2)  100 11/7/2019  122 4/9/2019 > OR = 60 -    Calcium Level (mg/dL)  9.2 11/7/2019  9.5 4/9/2019 8.6 - 10.3   Bilirubin Total (mg/dL)  1.0 11/7/2019  0.8 4/9/2019 0.2 - 1.2   Alkaline Phosphatase (unit/L)  70 11/7/2019  62 4/9/2019 40 - 115   AST/SGOT (unit/L)  15 11/7/2019  16 4/9/2019 10 - 40   ALT/SGPT (unit/L)  15 11/7/2019  12 4/9/2019 9 - 46   Protein Total (gm/dL)  7.2 11/7/2019  7.0 4/9/2019 6.1 - 8.1   Albumin Level (gm/dL)  4.2 11/7/2019  4.1 4/9/2019 3.6 - 5.1   Globulin  3.0 11/7/2019  2.9 4/9/2019 1.9 - 3.7   A/G Ratio  1.4 11/7/2019  1.4 4/9/2019 1.0 - 2.5   Hgb A1c ((H)) 5.9 11/7/2019  5.4 4/9/2019  - <5.7   Cholesterol (mg/dL)  119 11/7/2019  160 4/9/2019  - <200   Non-HDL Cholesterol  80 11/7/2019  127 4/9/2019  - <130   HDL (mg/dL) ((L)) 39 11/7/2019 ((L)) 33 4/9/2019 >40 -    Cholesterol/HDL Ratio  3.1  11/7/2019  4.8 4/9/2019  - <5.0   LDL  60 11/7/2019  97 4/9/2019    Triglyceride (mg/dL)  118 11/7/2019 ((H)) 205 4/9/2019  - <150   TSH (mIU/L)  4.11 11/7/2019  3.83 4/9/2019 0.40 - 4.50   WBC  8.2 11/7/2019  7.3 4/9/2019 3.8 - 10.8   RBC ((H)) 6.24 11/7/2019 ((H)) 6.16 4/9/2019 4.20 - 5.80   Hgb (gm/dL) ((H)) 17.2 11/7/2019  15.3 4/9/2019 13.2 - 17.1   Hct (%) ((H)) 52.7 11/7/2019  49.6 4/9/2019 38.5 - 50.0   MCV (fL)  84.5 11/7/2019  80.5 4/9/2019 80.0 - 100.0   MCH (pg)  27.6 11/7/2019 ((L)) 24.8 4/9/2019 27.0 - 33.0   MCHC (gm/dL)  32.6 11/7/2019 ((L)) 30.8 4/9/2019 32.0 - 36.0   RDW (%)  13.9 11/7/2019 ((H)) 16.1 4/9/2019 11.0 - 15.0   Platelet  356 11/7/2019  350 4/9/2019 140 - 400   MPV (fL)  10.4 11/7/2019  10.7 4/9/2019 7.5 - 12.5       Please contact my practice at [phone]  if you have any questions or concerns.     Sincerely,        Floyd Baker MD          What do your labs mean?  Below is a glossary of commonly ordered labs:  LDL   Bad Cholesterol   HDL   Good Cholesterol  AST/ALT   Liver Function   Cr/Creatinine   Kidney Function  Microalbumin   Kidney Function  BUN   Kidney Function  PSA   Prostate    TSH   Thyroid Hormone  HgbA1c   Diabetes Test   Hgb (Hemoglobin)   Red Blood Cells

## 2022-02-15 NOTE — PROGRESS NOTES
Patient:   PEABODY, TIMOTHY J            MRN: [id number]            FIN: [id number]               Age:   42 years     Sex:  Male     :  1975   Associated Diagnoses:   Chronic Lumbar Pain; Moderate major depression; Morbid obesity   Author:   Floyd Baker MD      Impression and Plan   Diagnosis     Chronic Lumbar Pain (XHD49-CQ M54.5).     Course:  Worsening.    Plan:  Patient is starting Physical Therapy tomorrow.    Orders     Orders   Charges (Evaluation and Management):  14595 office outpatient visit 15 minutes (Charge) (Order): Quantity: 1, Chronic Lumbar Pain  Morbid obesity  Moderate major depression.     Orders (Selected)   Prescriptions  Prescribed  gabapentin 300 mg oral capsule: 2 cap(s) ( 600 mg ), PO, TID, # 180 cap(s), 0 Refill(s), Type: Maintenance, patient has supply at home (Rx)  oxyCODONE 10 mg oral tablet: 1 tab(s) ( 10 mg ), po, qid, # 120 tab(s), 0 Refill(s), Type: Maintenance.     Diagnosis     Moderate major depression (FJC93-JL F32.1).     Course:  Worsening.    Plan:  Patient is seeing both a psychiatrist and a psychologist.    Orders     Orders (Selected)   Prescriptions  Prescribed  Abilify 10 mg oral tablet: 1 tab(s) ( 10 mg ), PO, Daily, # 30 tab(s), 0 Refill(s), Type: Maintenance, patient has supply at home (Rx)  DULoxetine 60 mg oral delayed release capsule: 2 cap(s) ( 120 mg ), po, daily, # 180 cap(s), 1 Refill(s), Type: Maintenance, Pharmacy: [facility]  traZODone 100 mg oral tablet: 1 tab(s) ( 100 mg ), PO, hs, # 30 tab(s), 5 Refill(s), Type: Maintenance, Pharmacy: [facility], 1 tab(s) po hs.     Diagnosis     Morbid obesity (WVY74-XB E66.01).     Course:  Worsening.    Orders     Orders (Selected)   Outpatient Orders  Ordered  Referral (Request): 18 15:32:00 CDT, Referred to: Other, Referred to: Bariatric [facility]/Bariatric Program with [facility] in the [facility] area, Morbid obesity.        Visit Information      Date of Service: 2018 02:55  pm  Performing Location: [facility]  Encounter#: [id number]      Primary Care Provider (PCP):  Floyd Baker MD    NPI# [id number]   Visit type:  Scheduled follow-up.    Accompanied by:  No one.    Source of history:  Self.    Referral source:  Self.    History limitation:  None.       Chief Complaint   5/1/2018 2:59 PM CDT     Pt in for Rx refill        History of Present Illness             The patient presents with back pain.  The back pain is located on both sides and lumbar region.  The back pain is described as aching and cramping.  The severity of the back pain is severe.  The back pain is constant.  The back pain has lasted for years.  Radiation of pain: to the left lower extremity and to the right lower extremity.  The context of the back pain: occurred in association with work.  History of two lumbar fusions at L4-5 and L5-S1.  Exacerbating factors consist of prolonged sitting, standing and walking.  Relieving factors consist of analgesics and rest.  Associated symptoms consist of none.  Prior treatment consists of physical therapy, pain management and surgery.        Interval History   Depression   Side effects of medication unchanged.  The course is worsening.  Compliance problems: none.  The effect on daily activities is change in activity level, change in sleeping patterns and no change in eating habits.  Associated symptoms characterized by insomnia, weight gain and suicidal thoughts.  Patient denies serious suicidal risk or plan at this time.        Review of Systems   Constitutional:  Negative.    Eye:  Negative.    Ear/Nose/Mouth/Throat:  Negative.    Respiratory:  Negative.    Cardiovascular:  Negative.    Gastrointestinal:  Negative.    Genitourinary:  Negative.    Hematology/Lymphatics:  Negative.    Endocrine:  Negative.    Immunologic:  Negative.    Musculoskeletal:  Back pain.    Integumentary:  Negative.    Neurologic:  Negative.    Psychiatric:  Depression.    All other  systems reviewed and negative      Health Status   Allergies:    Nonallergic Reactions (Selected)  Severe  Doxycycline (Vomiting)   Medications:  (Selected)   Prescriptions  Prescribed  Abilify 10 mg oral tablet: 1 tab(s) ( 10 mg ), PO, Daily, # 30 tab(s), 0 Refill(s), Type: Maintenance, patient has supply at home (Rx)  DULoxetine 60 mg oral delayed release capsule: 2 cap(s) ( 120 mg ), po, daily, # 180 cap(s), 1 Refill(s), Type: Maintenance, Pharmacy: [facility]  SUMAtriptan 100 mg oral tablet: 1 tab(s) ( 100 mg ), PO, Once, PRN: for migraine headache, # 9 tab(s), 5 Refill(s), Type: Soft Stop  gabapentin 300 mg oral capsule: 2 cap(s) ( 600 mg ), PO, TID, # 180 cap(s), 0 Refill(s), Type: Maintenance, patient has supply at home (Rx)  oxyCODONE 10 mg oral tablet: 1 tab(s) ( 10 mg ), po, qid, # 120 tab(s), 0 Refill(s), Type: Maintenance  raNITIdine 150 mg oral capsule: 1 cap(s) ( 150 mg ), PO, daily, # 30 cap(s), 0 Refill(s), Type: Maintenance, OTC (Rx)  traZODone 100 mg oral tablet: 1 tab(s) ( 100 mg ), PO, hs, # 30 tab(s), 5 Refill(s), Type: Maintenance, Pharmacy: [facility], 1 tab(s) po hs   Problem list:    All Problems  Cartilage tear / ICD-9-.9 / Confirmed  Chronic Lumbar Pain / ICD-9-.2 / Confirmed  Insomnia / ICD-9-.52 / Confirmed  Migraine Headache / ICD-9-.90 / Confirmed  Mild Episode of Depression / ICD-9-.31 / Confirmed  Moderate major depression / SNOMED CT 8714480 / Confirmed  Morbid obesity / SNOMED CT 138079902 / Confirmed  Obesity / ICD-9-.00 / Probable  Onychomycosis / SNOMED CT 1132975821 / Confirmed  PUD (Peptic Ulcer Disease) / ICD-9-.90 / Confirmed  Resolved: *Hospitalized@[facility] - Pneumonia  Resolved: Tobacco user / ICD-9-.1      Histories   Past Medical History:    Active  PUD (Peptic Ulcer Disease) (533.90)  Chronic Lumbar Pain (724.2)  Onychomycosis (9461743331)  Mild Episode of Depression (296.31)  Obesity (278.00)  Cartilage tear  (848.9)  Migraine Headache (346.90)  Insomnia (780.52)  Resolved  *Hospitalized@[facility] - Pneumonia: Onset on 2/14/2015 at 39 years.  Resolved on 2/19/2015 at 39 years.  Tobacco user (305.1):  Resolved.   Family History:       Procedure history:    Spinal fusion (SNOMED CT 68308352) performed by Freddy Duvall MD on 8/22/2017 at 41 Years.  Comments:  8/31/2017 8:02 AM - Ashley Amaro  L4-5.  Fusion L5-S1 in 2015 at 40 Years.  Comments:  2/3/2015 8:49 AM - Floyd Baker MD  02/05/2015  [facility]  Dr. Rios  Bilateral L5-S1 Decompression Foramina in 2014 at 39 Years.  Comments:  4/29/2014 3:20 PM - Floyd Baker MD, Dr.  [facility]  05/06/2014    Vasectomy (SNOMED CT 39905932) on 7/26/2012 at 36 Years.  Arthroscopy (SNOMED CT 45933311) in 2011 at 36 Years.  Comments:  7/11/2012 8:59 AM - Miracle Griffiths  Bilateral knee  Polysomnogram (SNOMED CT 969335905) on 8/14/2009 at 33 Years.  Comments:  7/11/2012 9:09 AM - Miracle Griffiths  No significant sleep-disorder findings.  Bruxism.  Follow-up indicated.  Tonsillectomy (SNOMED CT 750575990) in 1980 at 5 Years.   Social History:        Alcohol Assessment: Denies Alcohol Use            Never      Tobacco Assessment: Past            Past, Cigarettes, 4 per day.                     Comments:                      04/29/2014 - Floyd Baker MD                     Quit 04/01/2014      Substance Abuse Assessment            Never      Employment and Education Assessment            Employed                     Comments:                      04/29/2014 - Floyd Baker MD                     [employer]      Home and Environment Assessment            Marital status:  (Living together).  Lives with Self, Children, Spouse.  Living situation:               Home/Independent.      Exercise and Physical Activity Assessment: Does not exercise            Exercise frequency: No regular exercise..        Physical Examination   Vital Signs   5/1/2018  2:59 PM CDT Peripheral Pulse Rate 78 bpm    Pulse Site Radial artery    HR Method Manual    Systolic Blood Pressure 124 mmHg    Diastolic Blood Pressure 70 mmHg    Mean Arterial Pressure 88 mmHg    BP Site Left arm    BP Method Manual      Measurements from flowsheet : Measurements   5/1/2018 2:59 PM CDT Height Measured - Standard 70 in    Weight Measured - Standard 287.8 lb    BSA 2.54 m2    Body Mass Index 41.29 kg/m2  HI      General:  Mild distress, elevated BMI.    Respiratory:  Lungs are clear to auscultation, Respirations are non-labored.    Cardiovascular:  Normal rate, Regular rhythm.    Musculoskeletal:  Normal gait, Normal station.         Spine/torso exam: Lumbar ( Bilateral, No deformity, No erythema, No ecchymosis, No swelling, Tenderness, Range of motion ( Diminished ), Straight leg raising, supine ( Negative ) ).    Integumentary:  Warm, Dry, Pink.    Neurologic:  Alert, Oriented, Normal sensory, Normal motor function, No focal deficits, Normal deep tendon reflexes.    Psychiatric:  Cooperative, Normal judgment, Non-suicidal.         Mood and affect: Calm, Depressed.         Behavior: Relaxed.         Judgment: Able to make sensible decisions, Appropriate in social situations.         Abnormal / Psychotic thoughts: none.         Thought process: Appropriate.

## 2022-02-15 NOTE — PROGRESS NOTES
Patient:   PEABODY, TIMOTHY J            MRN: [id number]            FIN: [id number]               Age:   42 years     Sex:  Male     :  1975   Associated Diagnoses:   Chronic Lumbar Pain; Mild Episode of Depression; Obesity   Author:   Floyd Baker MD      Impression and Plan   Diagnosis     Chronic Lumbar Pain (GZK70-XM M54.5).     Course:  Worsening.    Plan:    1. Pool therapy  2. Analgesics  3. Pain specialty consut.    Orders     Orders   Charges (Evaluation and Management):  85979 office outpatient visit 25 minutes (Charge) (Order): Quantity: 1, Chronic Lumbar Pain  Obesity  Mild Episode of Depression.     Orders (Selected)   Prescriptions  Prescribed  gabapentin 300 mg oral capsule: = 2 cap(s) ( 600 mg ), PO, TID, # 180 cap(s), 1 Refill(s), Type: Maintenance, Pharmacy: [facility], 2 cap(s) Oral tid  oxyCODONE 10 mg oral tablet: = 1 tab(s) ( 10 mg ), po, qid, # 120 tab(s), 0 Refill(s), Type: Maintenance, Pharmacy: [facility], 1 tab(s) Oral qid.     Diagnosis     Mild Episode of Depression (DBK06-QJ F32.9).     Course:  Well controlled.    Orders     Orders (Selected)   Prescriptions  Prescribed  DULoxetine 60 mg oral delayed release capsule: = 2 cap(s) ( 120 mg ), po, daily, # 180 cap(s), 1 Refill(s), Type: Maintenance, Pharmacy: [facility], 2 cap(s) Oral daily  amitriptyline 25 mg oral tablet: = 1 tab(s) ( 25 mg ), PO, hs, # 30 tab(s), 0 Refill(s), Type: Maintenance, patient has supply at home (Rx).     Diagnosis     Obesity (SIK09-NN E66.9).     Course:  Worsening.    Orders     Orders (Selected)   Outpatient Orders  Ordered  Referral (Request): 10/01/18 12:01:00 CDT, Referred to: Other, Referred to: Bariatric [facility] ( [facility] ), Obesity.        Visit Information      Date of Service: 10/01/2018 11:33 am  Performing Location: [facility]  Encounter#: [id number]      Primary Care Provider (PCP):  Floyd Baker MD    NPI# [id number]   Visit type:   Scheduled follow-up.    Accompanied by:  No one.    Source of history:  Self.    Referral source:  Self.    History limitation:  None.       Chief Complaint   10/1/2018 11:35 AM CDT   Pt here for refill        History of Present Illness             The patient presents with back pain.  The back pain is located on both sides and lumbar region.  The back pain is described as aching and cramping.  The severity of the back pain is severe.  The back pain is constant.  The back pain has lasted for years.  Radiation of pain: to the left lower extremity and to the right lower extremity.  The context of the back pain: occurred in association with work.  History of two lumbar fusions at L4-5 and L5-S1.  Exacerbating factors consist of prolonged sitting, standing and walking.  Relieving factors consist of analgesics and rest.  Associated symptoms consist of none.  Prior treatment consists of physical therapy, pain management and surgery.        Interval History   Depression   Side effects of medication unchanged.  The course is worsening.  Compliance problems: none.  The effect on daily activities is change in activity level, change in sleeping patterns and no change in eating habits.  Associated symptoms characterized by insomnia, weight gain and suicidal thoughts.  Patient denies serious suicidal risk or plan at this time.        Review of Systems   Constitutional:  Negative.    Eye:  Negative.    Ear/Nose/Mouth/Throat:  Negative.    Respiratory:  Negative.    Cardiovascular:  Negative.    Gastrointestinal:  Negative.    Genitourinary:  Negative.    Hematology/Lymphatics:  Negative.    Endocrine:  Negative.    Immunologic:  Negative.    Musculoskeletal:  Back pain.    Integumentary:  Negative.    Neurologic:  Negative.    Psychiatric:  Depression.    All other systems reviewed and negative      Health Status   Allergies:    Nonallergic Reactions (Selected)  Severe  Doxycycline (Vomiting)   Medications:  (Selected)    Prescriptions  Prescribed  DULoxetine 60 mg oral delayed release capsule: = 2 cap(s) ( 120 mg ), po, daily, # 180 cap(s), 1 Refill(s), Type: Maintenance, Pharmacy: [facility], 2 cap(s) Oral daily  SUMAtriptan 100 mg oral tablet: = 1 tab(s) ( 100 mg ), PO, Once, PRN: for migraine headache, # 9 tab(s), 5 Refill(s), Type: Soft Stop, Pharmacy: [facility], 1 tab(s) Oral once,PRN:for migraine headache  amitriptyline 25 mg oral tablet: = 1 tab(s) ( 25 mg ), PO, hs, # 30 tab(s), 0 Refill(s), Type: Maintenance, patient has supply at home (Rx)  gabapentin 300 mg oral capsule: = 2 cap(s) ( 600 mg ), PO, TID, # 180 cap(s), 1 Refill(s), Type: Maintenance, Pharmacy: [facility], 2 cap(s) Oral tid  oxyCODONE 10 mg oral tablet: = 1 tab(s) ( 10 mg ), po, qid, # 120 tab(s), 0 Refill(s), Type: Maintenance, Pharmacy: [facility], 1 tab(s) Oral qid  raNITIdine 150 mg oral capsule: = 1 cap(s) ( 150 mg ), PO, daily, # 30 cap(s), 0 Refill(s), Type: Maintenance, Pharmacy: [facility], 1 cap(s) Oral daily   Problem list:    All Problems  Cartilage tear / ICD-9-.9 / Confirmed  Chronic Lumbar Pain / ICD-9-.2 / Confirmed  Chronic pain / SNOMED CT 454492385 / Confirmed  Insomnia / ICD-9-.52 / Confirmed  Migraine Headache / ICD-9-.90 / Confirmed  Mild Episode of Depression / ICD-9-.31 / Confirmed  Moderate major depression / SNOMED CT 4532757 / Confirmed  Morbid obesity / SNOMED CT 595348624 / Confirmed  Obesity / ICD-9-.00 / Probable  Onychomycosis / SNOMED CT 8809448421 / Confirmed  PUD (Peptic Ulcer Disease) / ICD-9-.90 / Confirmed  Resolved: *Hospitalized@[facility] - Pneumonia  Resolved: Tobacco user / ICD-9-.1      Histories   Past Medical History:    Active  PUD (Peptic Ulcer Disease) (533.90)  Chronic Lumbar Pain (724.2)  Onychomycosis (6600651038)  Mild Episode of Depression (296.31)  Obesity (278.00)  Cartilage tear (848.9)  Migraine Headache  (346.90)  Insomnia (780.52)  Resolved  *Hospitalized@[facility] - Pneumonia: Onset on 2/14/2015 at 39 years.  Resolved on 2/19/2015 at 39 years.  Tobacco user (305.1):  Resolved.   Family History:       Procedure history:    Spinal fusion (SNOMED CT 11603665) performed by Freddy Duvall MD on 8/22/2017 at 41 Years.  Comments:  8/31/2017 8:02 AM - Ashley Amaro  L4-5.  Fusion L5-S1 in 2015 at 40 Years.  Comments:  2/3/2015 8:49 AM - Floyd Baker MD  02/05/2015  [facility]  Dr. Rios  Bilateral L5-S1 Decompression Foramina in 2014 at 39 Years.  Comments:  4/29/2014 3:20 PM - Floyd Baker MD, Dr.  [facility]  05/06/2014    Vasectomy (SNOMED CT 05504332) on 7/26/2012 at 36 Years.  Arthroscopy (SNOMED CT 00151867) in 2011 at 36 Years.  Comments:  7/11/2012 8:59 AM - Miracle Griffiths  Bilateral knee  Polysomnogram (SNOMED CT 423311061) on 8/14/2009 at 33 Years.  Comments:  7/11/2012 9:09 AM - Miracle Griffiths  No significant sleep-disorder findings.  Bruxism.  Follow-up indicated.  Tonsillectomy (SNOMED CT 139989948) in 1980 at 5 Years.   Social History:        Alcohol Assessment: Denies Alcohol Use            Never      Tobacco Assessment: Past            Past, Cigarettes, 4 per day.                     Comments:                      04/29/2014 - Floyd Baker MD                     Quit 04/01/2014      Substance Abuse Assessment            Never      Employment and Education Assessment            Employed                     Comments:                      04/29/2014 - Floyd Baker MD                     [employer]      Home and Environment Assessment            Marital status:  (Living together).  Lives with Self, Children, Spouse.  Living situation:               Home/Independent.      Exercise and Physical Activity Assessment: Does not exercise            Exercise frequency: No regular exercise..        Physical Examination   Vital Signs   10/1/2018 11:50 AM CDT Systolic  Blood Pressure 138 mmHg  HI   10/1/2018 11:35 AM CDT Peripheral Pulse Rate 93 bpm    Systolic Blood Pressure 144 mmHg  HI    Diastolic Blood Pressure 86 mmHg  HI    Mean Arterial Pressure 105 mmHg    BP Site Right arm    Oxygen Saturation 97 %      Measurements from flowsheet : Measurements   10/1/2018 11:35 AM CDT Height Measured - Standard 70 in    Weight Measured - Standard 291.2 lb    BSA 2.55 m2    Body Mass Index 41.78 kg/m2  HI      General:  Mild distress, elevated BMI.    Respiratory:  Lungs are clear to auscultation, Respirations are non-labored.    Cardiovascular:  Normal rate, Regular rhythm.    Musculoskeletal:  Normal gait, Normal station.         Spine/torso exam: Lumbar ( Bilateral, No deformity, No erythema, No ecchymosis, No swelling, Tenderness, Range of motion ( Diminished ), Straight leg raising, supine ( Negative ) ).    Integumentary:  Warm, Dry, Pink.    Neurologic:  Alert, Oriented, Normal sensory, Normal motor function, No focal deficits, Normal deep tendon reflexes.    Psychiatric:  Cooperative, Normal judgment, Non-suicidal.         Mood and affect: Calm, Depressed.         Behavior: Relaxed.         Judgment: Able to make sensible decisions, Appropriate in social situations.         Abnormal / Psychotic thoughts: none.         Thought process: Appropriate.

## 2022-02-15 NOTE — TELEPHONE ENCOUNTER
---------------------  From: Julio LUCIA Criss[facility] (eRx Pool ([id number]))   To: Floyd Baker MD;     Sent: 9/23/2020 12:40:47 PM CDT  Subject: FW: Medication Management   Due Date/Time: 9/24/2020 9:34:00 AM CDT           ------------------------------------------  From: [facility]  To: Floyd Baker MD  Sent: September 23, 2020 9:34:47 AM CDT  Subject: Medication Management  Due: September 9, 2020 4:21:06 PM CDT     ** On Hold Pending Signature **     Drug: testosterone (Testosterone Cypionate 200 mg/mL intramuscular solution), INJECT INTRAMUSCULAR HALF (1/2) MILLILITER(ML) TWICE WEEKLY MONDAY/FRIDAY  Quantity: 10 mL  Days Supply: 30  Refills: 0  Substitutions Allowed  Notes from Pharmacy:     Dispensed Drug: testosterone (Testosterone Cypionate 200 mg/mL intramuscular solution), INJECT INTRAMUSCULAR HALF (1/2) MILLILITER(ML) TWICE WEEKLY MONDAY/FRIDAY  Quantity: 10 mL  Days Supply: 30  Refills: 0  Substitutions Allowed  Notes from Pharmacy:  ------------------------------------------  ** Submitted: **  Order:testosterone (Testosterone Cypionate 200 mg/mL intramuscular solution)  See Instructions  Inject intramuscular half (1/2) milliliter(ML) TWICE WEEKLY MONDAY/FRIDAY  Qty:  10 mL        Refills:  1          Route To Pharmacy - [facility]    Signed by Floyd Baker MD  9/23/2020 7:07:00 PM [facility]

## 2022-02-15 NOTE — PROGRESS NOTES
Patient:   PEABODY, TIMOTHY J            MRN: [id number]            FIN: [id number]               Age:   44 years     Sex:  Male     :  1975   Associated Diagnoses:   TMJ tenderness, right   Author:   Floyd Baker MD      Impression and Plan   Diagnosis     TMJ tenderness, right ([id number] M26.621).     Course:  Worsening.    Plan:    1. Avoid chewing for a few days  2. Ice pack QID  3. Naproxen PRN  4. If not improving consider TMJ specialty consult.       Visit Information      Date of Service: 2019 01:17 pm  Performing Location: [facility]  Encounter#: [id number]      Primary Care Provider (PCP):  Floyd Baker MD    NPI# [id number]      Referring Provider:  Floyd Baker MD, NPI# [id number]   Visit type:  New symptom.    Accompanied by:  No one.    Source of history:  Self.    History limitation:  None.       Chief Complaint   Chief complaint discussed and confirmed correct.     2019 1:24 PM CST    Pt here for ear pain        History of Present Illness             The patient presents with right TMJ pain for several days..        Review of Systems   Constitutional:  Negative.    Eye:  Negative.    Ear/Nose/Mouth/Throat:  Negative.    Respiratory:  Negative.    Cardiovascular:  Negative.    Gastrointestinal:  Negative.    Genitourinary:  Negative.    Hematology/Lymphatics:  Negative.    Endocrine:  Negative.    Immunologic:  Negative.    Musculoskeletal:  Negative.    Integumentary:  Negative.    Neurologic:  Negative.    Psychiatric:  Negative.    All other systems reviewed and negative      Health Status   Allergies:    Nonallergic Reactions (Selected)  Severe  Doxycycline (Vomiting)   Medications:  (Selected)   Prescriptions  Prescribed  Chantix Continuing Month 1 mg oral tablet: See Instructions, Instructions: USE AS DIRECTED BY PHYSICIAN, # 53 EA, 3 Refill(s)ARLET, Type: Maintenance, Pharmacy: [facility]  DULoxetine 60 mg oral delayed release capsule: = 2 cap(s) (  120 mg ), po, daily, # 180 cap(s), 1 Refill(s), Type: Maintenance, Pharmacy: [facility], 2 cap(s) Oral daily  SUMAtriptan 100 mg oral tablet: = 1 tab(s) ( 100 mg ), PO, Once, PRN: for migraine headache, # 9 tab(s), 5 Refill(s), Type: Soft Stop, Pharmacy: [facility], 1 tab(s) Oral once,PRN:for migraine headache  Testosterone Cypionate 200 mg/mL intramuscular solution: See Instructions, Instructions: Inject intramuscular half (1/2) milliliter(ML) TWICE WEEKLY MONDAY/FRIDAY, # 10 mL, 1 Refill(s), Type: Soft Stop, Pharmacy: [facility], Inject intramuscular half (1/2) milliliter(ML) TWICE WEEKLY MONDAY/FRIDAY  amitriptyline 25 mg oral tablet: = 1 tab(s) ( 25 mg ), PO, hs, # 90 tab(s), 1 Refill(s), Type: Maintenance, Pharmacy: [facility], 1 tab(s) Oral hs  atorvastatin 20 mg oral tablet: = 1 tab(s) ( 20 mg ), PO, Daily, # 90 tab(s), 1 Refill(s), Type: Maintenance, Pharmacy: [facility], 1 tab(s) Oral daily  levothyroxine 25 mcg (0.025 mg) oral tablet: = 1 tab(s) ( 25 mcg ), Oral, daily, # 90 tab(s), 1 Refill(s), ARLET, Type: Maintenance, Pharmacy: [facility], 1 tab(s) Oral daily  metFORMIN 500 mg oral tablet: = 1 tab(s), Oral, bidwm, # 180 tab(s), 1 Refill(s), ARLET, Type: Maintenance, Pharmacy: [facility], 1 tab(s) Oral bidwm  oxyCODONE 10 mg oral tablet: = 1 tab(s) ( 10 mg ), po, q6 hrs, # 120 tab(s), 0 Refill(s), Type: Acute, Pharmacy: [facility], 1 tab(s) Oral q6 hrs  oxyCODONE 10 mg oral tablet: = 1 tab(s), Oral, qid, # 120 EA, 0 Refill(s), Type: Soft Stop, Pharmacy: [facility], 1 tab(s) Oral qid  raNITIdine 150 mg oral capsule: = 1 cap(s) ( 150 mg ), PO, daily, # 90 cap(s), 1 Refill(s), Type: Maintenance, Pharmacy: [facility], 1 cap(s) Oral daily  tadalafil 10 mg oral tablet: See Instructions, Instructions: 1 tab(s) Oral daily PRN planned sexual activity, # 10 tab(s), 2 Refill(s), Type: Maintenance, Pharmacy: [facility], 1 tab(s) Oral  daily PRN planned sexual activity,    Medications          *denotes recorded medication          DULoxetine 60 mg oral delayed release capsule: 120 mg, 2 cap(s), po, daily, 180 cap(s), 1 Refill(s).          SUMAtriptan 100 mg oral tablet: 100 mg, 1 tab(s), PO, Once, PRN: for migraine headache, 9 tab(s), 5 Refill(s).          amitriptyline 25 mg oral tablet: 25 mg, 1 tab(s), PO, hs, 90 tab(s), 1 Refill(s).          atorvastatin 20 mg oral tablet: 20 mg, 1 tab(s), PO, Daily, 90 tab(s), 1 Refill(s).          levothyroxine 25 mcg (0.025 mg) oral tablet: 25 mcg, 1 tab(s), Oral, daily, 90 tab(s), 1 Refill(s).          metFORMIN 500 mg oral tablet: 1 tab(s), Oral, bidwm, 180 tab(s), 1 Refill(s).          oxyCODONE 10 mg oral tablet: 1 tab(s), Oral, qid, 120 EA, 0 Refill(s).          oxyCODONE 10 mg oral tablet: 10 mg, 1 tab(s), po, q6 hrs, 120 tab(s), 0 Refill(s).          raNITIdine 150 mg oral capsule: 150 mg, 1 cap(s), PO, daily, 90 cap(s), 1 Refill(s).          tadalafil 10 mg oral tablet: See Instructions, 1 tab(s) Oral daily PRN planned sexual activity, 10 tab(s), 2 Refill(s).          Testosterone Cypionate 200 mg/mL intramuscular solution: See Instructions, Inject intramuscular half (1/2) milliliter(ML) TWICE WEEKLY MONDAY/FRIDAY, 10 mL, 1 Refill(s).          Chantix Continuing Month 1 mg oral tablet: See Instructions, USE AS DIRECTED BY PHYSICIAN, 53 EA, 3 Refill(s).       Problem list:    All Problems  Benign essential HTN / SNOMED CT 4273287 / Confirmed  Benign familial tremor / SNOMED CT 1361489051 / Confirmed  Cartilage tear / ICD-9-.9 / Confirmed  Chronic Lumbar Pain / ICD-9-.2 / Confirmed  Chronic pain / SNOMED CT 786215912 / Confirmed  Hypothyroidism / SNOMED CT 09189758 / Confirmed  Insomnia / ICD-9-.52 / Confirmed  Low testosterone in male / SNOMED CT 766854598 / Confirmed  Migraine Headache / ICD-9-.90 / Confirmed  Mild Episode of Depression / ICD-9-.31 / Confirmed  Moderate  major depression / SNOMED CT 4496446 / Confirmed  Morbid obesity / SNOMED CT 656349040 / Confirmed  Obesity / ICD-9-.00 / Probable  Onychomycosis / SNOMED CT 6731205824 / Confirmed  PUD (Peptic Ulcer Disease) / ICD-9-.90 / Confirmed  Type 2 diabetes mellitus / SNOMED CT 724167601 / Confirmed  Resolved: *Hospitalized@[facility] - Pneumonia  Resolved: Tobacco user / ICD-9-.1      Histories   Past Medical History:    Active  PUD (Peptic Ulcer Disease) (533.90)  Chronic Lumbar Pain (724.2)  Onychomycosis (0607710739)  Mild Episode of Depression (296.31)  Obesity (278.00)  Cartilage tear (848.9)  Migraine Headache (346.90)  Insomnia (780.52)  Resolved  *Hospitalized@[facility] - Pneumonia: Onset on 2/14/2015 at 39 years.  Resolved on 2/19/2015 at 39 years.  Tobacco user (305.1):  Resolved.   Family History:       Procedure history:    Spinal fusion (SNOMED CT 86982532) performed by Freddy Duvall MD on 8/22/2017 at 41 Years.  Comments:  8/31/2017 8:02 AM CDT - Ashley Amaro  L4-5.  Fusion L5-S1 in 2015 at 40 Years.  Comments:  2/3/2015 8:49 AM Floyd Raines MD  02/05/2015  [facility]  Dr. Rios  Bilateral L5-S1 Decompression Foramina in 2014 at 39 Years.  Comments:  4/29/2014 3:20 PM Floyd Mayo MD, Dr.  [facility]  05/06/2014    Vasectomy (SNOMED CT 33309191) on 7/26/2012 at 36 Years.  Arthroscopy (SNOMED CT 47151205) in 2011 at 36 Years.  Comments:  7/11/2012 8:59 AM [facility]  Bilateral knee  Polysomnogram (SNOMED CT 607713096) on 8/14/2009 at 33 Years.  Comments:  7/11/2012 9:09 AM [facility]  No significant sleep-disorder findings.  Bruxism.  Follow-up indicated.  Tonsillectomy (SNOMED CT 743698426) in 1980 at 5 Years.   Social History:        Alcohol Assessment: Denies Alcohol Use            Never      Tobacco Assessment: Past            Past, Cigarettes, 4 per day.                     Comments:                      04/29/2014 - Samuel AMEZCUA,  Floyd                     Quit 04/01/2014      Substance Abuse Assessment            Never      Employment and Education Assessment            Employed                     Comments:                      04/29/2014 - Samuel AMEZCUA, [employer]      Home and Environment Assessment            Marital status:  (Living together).  Lives with Self, Children, Spouse.  Living situation:               Home/Independent.      Exercise and Physical Activity Assessment: Does not exercise            Exercise frequency: No regular exercise..        Physical Examination   Vital signs reviewed  and within acceptable limits    Vital Signs   12/9/2019 1:24 PM CST Temperature Tympanic 98.1 DegF    Peripheral Pulse Rate 91 bpm    Systolic Blood Pressure 144 mmHg  HI    Diastolic Blood Pressure 88 mmHg  HI    Mean Arterial Pressure 107 mmHg    Oxygen Saturation 97 %      Measurements from flowsheet : Measurements   12/9/2019 1:24 PM CST Height Measured - Standard 70 in    Weight Measured - Standard 288 lb    BSA 2.54 m2    Body Mass Index 41.32 kg/m2  HI      General:  No acute distress.    HENT:  Normocephalic, Tympanic membranes are clear, Normal hearing, Oral mucosa is moist, No pharyngeal erythema, No sinus tenderness, right TM area tender to palpation.  No swelling, redness or deformity..    Neck:  Supple, Non-tender, No lymphadenopathy, No thyromegaly.    Respiratory:  Lungs are clear to auscultation, Respirations are non-labored, Breath sounds are equal, Symmetrical chest wall expansion.    Cardiovascular:  Normal rate, Regular rhythm, No murmur, No gallop, Good pulses equal in all extremities, Normal peripheral perfusion, No edema.    Integumentary:  Warm, Dry, Pink.    Neurologic:  Alert, Oriented.    Psychiatric:  Cooperative, Appropriate mood & affect, Normal judgment.       Health Maintenance      Recommendations     Pending (in the next year)        OverDue           Depression  Screen (Male) due  06/28/18  and every 1  year(s)           Influenza Vaccine due  09/01/19  and every 1  year(s)        Due            DM - Communication with Managing Provider due  12/09/19  and every 1  year(s)           DM - Eye Exam due  12/09/19  and every 1  year(s)           DM - Microalbumin due  12/09/19  and every 1  year(s)        Near Due            DM - HgbA1c near due  02/07/20  and every 3  month(s)        Due In Future            DM - Foot Exam not due until  06/13/20  and every 1  year(s)           Lipid Disorders Screen (Male) not due until  11/07/20  and every 1  year(s)           Type 2 Diabetes Mellitus Screen (Male) not due until  11/07/20  and every 1  year(s)     Satisfied (in the past 1 year)        Satisfied            Body Mass Index Check (Male) on  12/09/19.           Body Mass Index Check (Male) on  11/18/19.           Body Mass Index Check (Male) on  09/05/19.           Body Mass Index Check (Male) on  08/28/19.           Body Mass Index Check (Male) on  06/13/19.           Body Mass Index Check (Male) on  04/09/19.           DM - Foot Exam on  06/13/19.           DM - Foot Exam on  04/09/19.           DM - HgbA1c on  11/07/19.           DM - HgbA1c on  04/09/19.           High Blood Pressure Screen (Male) on  12/09/19.           High Blood Pressure Screen (Male) on  11/18/19.           High Blood Pressure Screen (Male) on  11/18/19.           High Blood Pressure Screen (Male) on  09/30/19.           High Blood Pressure Screen (Male) on  09/05/19.           High Blood Pressure Screen (Male) on  08/29/19.           High Blood Pressure Screen (Male) on  08/28/19.           High Blood Pressure Screen (Male) on  06/13/19.           Lipid Disorders Screen (Male) on  11/07/19.           Lipid Disorders Screen (Male) on  11/07/19.           Lipid Disorders Screen (Male) on  11/07/19.           Lipid Disorders Screen (Male) on  11/07/19.           Lipid Disorders Screen (Male) on  04/09/19.            Lipid Disorders Screen (Male) on  04/09/19.           Lipid Disorders Screen (Male) on  04/09/19.           Lipid Disorders Screen (Male) on  04/09/19.           Obesity Screen and Counseling (Male) on  12/09/19.           Obesity Screen and Counseling (Male) on  11/18/19.           Obesity Screen and Counseling (Male) on  09/05/19.           Obesity Screen and Counseling (Male) on  08/28/19.           Obesity Screen and Counseling (Male) on  06/13/19.           Obesity Screen and Counseling (Male) on  04/09/19.           Tobacco Use Screen (Male) on  12/09/19.           Tobacco Use Screen (Male) on  11/18/19.           Tobacco Use Screen (Male) on  09/05/19.           Tobacco Use Screen (Male) on  08/28/19.           Tobacco Use Screen (Male) on  06/13/19.           Tobacco Use Screen (Male) on  04/09/19.           Type 2 Diabetes Mellitus Screen (Male) on  11/07/19.           Type 2 Diabetes Mellitus Screen (Male) on  04/09/19.

## 2022-02-15 NOTE — TELEPHONE ENCOUNTER
---------------------  From: Shalonda Hill   To: Crissy Kim CMA;     Sent: 4/21/2020 4:32:39 PM CDT  Subject: General Message     Called requesting a refill on oxycodone. Was not sure if he was due for an appointment.---------------------  From: Crissy Kim CMA   To: Appointment Pool ([id number]_[facility]);     Sent: 4/22/2020 8:16:57 AM CDT  Subject: FW: General Message     please call Sreedhar and let him know that he is due for visit and set him up with telephone visit---------------------  From: Angelita Shields (Appointment Pool ([id number]_[facility]))   To: Crissy Kim CMA;     Sent: 4/22/2020 1:48:18 PM CDT  Subject: RE: General Message     left message for pt to call back and set up apt for telephone visit

## 2022-02-15 NOTE — TELEPHONE ENCOUNTER
---------------------  From: Crissy Kim CMA   To: Floyd Baker MD;     Sent: 2019 10:09:46 AM CST  Subject: refill request-oxycodone     PCP:   LYDIA    Medication:   oxycodone  Last Filled:      Quantity:  _  Refills:  _      Date of last office visit and reason:    for TMJ,    for paresthesia      Note:  pt requesting refill    Pharmacy: [facility]    Resource:   Sreedhar  Phone:   [phone]

## 2022-02-15 NOTE — LETTER
(Inserted Image. Unable to display)       February 07, 2019      TIMOTHY PEABODY  [address] [place], [place] [id number]          Dear DAVID,      Thank you for selecting [facility] for your healthcare needs.     Our records indicate you are due for the following services:     Follow-up Office Visit    To schedule an appointment or if you have further questions, please contact your primary clinic:   [facility]       [phone]   [facility]       [phone]              [facility]     [phone]    Powered by [employer]    Sincerely,    Floyd Baker MD

## 2022-02-15 NOTE — TELEPHONE ENCOUNTER
---------------------  From: Shalonda Hill   To: Crissy Kim CMA;     Sent: 5/19/2020 1:48:04 PM CDT  Subject: General Message     Is requesting refill on oxycodone.---------------------  From: Crissy Kim CMA   To: Floyd Baker MD;     Sent: 5/19/2020 1:50:02 PM CDT  Subject: FW: General Message  ** Submitted: **  Order:oxyCODONE (oxyCODONE 10 mg oral tablet)  1 tab(s)  Oral  qid  Qty:  120 EA        Refills:  0          Route To Pharmacy - [facility]    Signed by Floyd Baker MD  5/20/2020 5:11:00 PM

## 2022-02-15 NOTE — PROGRESS NOTES
Patient:   PEABODY, TIMOTHY J            MRN: [id number]            FIN: [id number]               Age:   43 years     Sex:  Male     :  1975   Associated Diagnoses:   Benign essential HTN; Benign familial tremor   Author:   Floyd Baker MD      Impression and Plan   Diagnosis     Benign essential HTN (VSC39-FI I10).     Course:  Improving.    Orders     Orders   Charges (Evaluation and Management):  65420 office outpatient visit 15 minutes (Charge) (Order): Quantity: 1, Benign essential HTN  Benign familial tremor.     Orders (Selected)   Prescriptions  Prescribed  propranolol 120 mg oral capsule, extended release: = 1 cap(s) ( 120 mg ), Oral, daily, # 90 cap(s), 1 Refill(s), Type: Maintenance, Pharmacy: [facility], 1 cap(s) Oral daily.     Diagnosis     Benign familial tremor (ASQ45-YP G25.0).        Visit Information      Date of Service: 2019 08:13 am  Performing Location: [facility]  Encounter#: [id number]      Primary Care Provider (PCP):  Floyd Baker MD    NPI# [id number]      Referring Provider:  Floyd Baker MD# [id number]   Visit type:  Scheduled follow-up.    Accompanied by:  No one.    Source of history:  Self.    Referral source:  Self.    History limitation:  None.       Chief Complaint   2019 8:23 AM CDT     Pt here for f/u on new BP med        History of Present Illness             The patient presents for follow-up evaluation of hypertension.  The quality of hypertension symptom(s) since the patient's last visit is described as being unchanged.  The severity of the hypertension symptom(s) since the last visit is moderate.  Since the patient's last visit, the timing/course of hypertension symptom(s) is constant.  Exacerbating factors consist of none.  Relieving factors consist of medication.  Associated symptoms consist of none.  Prior treatment consists of lifestyle modification (weight reduction, dietary sodium restriction, increased physical  activity, adoption of DASH eating plan).  Medical encounters: none.  Compliance problems: none.               The patient presents with tremor.  The location of the tremor is bilateral and hand(s).  The tremor is described as fine.  The severity of the tremor is mild.  The tremor is constant.  The tremor has lasted for 3 month(s).  The context of the tremor: occurred hereditary.  Exacerbating factors consist of none.  Relieving factors consist of medication.  Associated symptoms consist of none.        Review of Systems   Constitutional:  Negative.    Eye:  Negative.    Ear/Nose/Mouth/Throat:  Negative.    Respiratory:  Negative.    Cardiovascular:  Negative.    Gastrointestinal:  Negative.    Genitourinary:  Negative.    Hematology/Lymphatics:  Negative.    Endocrine:  Negative.    Immunologic:  Negative.    Musculoskeletal:  Negative.    Integumentary:  Negative.    Neurologic:  Negative.    Psychiatric:  Negative.    All other systems reviewed and negative      Health Status   Allergies:    Nonallergic Reactions (Selected)  Severe  Doxycycline (Vomiting)   Medications:  (Selected)   Prescriptions  Prescribed  DULoxetine 60 mg oral delayed release capsule: = 2 cap(s) ( 120 mg ), po, daily, # 180 cap(s), 1 Refill(s), Type: Maintenance, Pharmacy: [facility], 2 cap(s) Oral daily  SUMAtriptan 100 mg oral tablet: = 1 tab(s) ( 100 mg ), PO, Once, PRN: for migraine headache, # 9 tab(s), 5 Refill(s), Type: Soft Stop, Pharmacy: [facility], 1 tab(s) Oral once,PRN:for migraine headache  Testosterone Cypionate 200 mg/mL intramuscular solution: See Instructions, Instructions: Inject intramuscular half (1/2) milliliter(ML) TWICE WEEKLY MONDAY/FRIDAY, # 10 mL, 1 Refill(s), Type: Soft Stop, Pharmacy: [facility], Inject intramuscular half (1/2) milliliter(ML) TWICE WEEKLY MONDAY/FRIDAY  amitriptyline 25 mg oral tablet: = 1 tab(s) ( 25 mg ), PO, hs, # 90 tab(s), 1 Refill(s), Type: Maintenance, Pharmacy:  [facility], 1 tab(s) Oral hs  atorvastatin 20 mg oral tablet: = 1 tab(s) ( 20 mg ), PO, Daily, # 90 tab(s), 1 Refill(s), Type: Maintenance, Pharmacy: [facility], 1 tab(s) Oral daily  levothyroxine 25 mcg (0.025 mg) oral tablet: = 1 tab(s) ( 25 mcg ), Oral, daily, # 90 tab(s), 1 Refill(s), ARLET, Type: Maintenance, Pharmacy: [facility], 1 tab(s) Oral daily  metFORMIN 500 mg oral tablet: = 1 tab(s), Oral, bidwm, # 180 tab(s), 1 Refill(s), ARLET, Type: Maintenance, Pharmacy: [facility], 1 tab(s) Oral bidwm  oxyCODONE 10 mg oral tablet: = 1 tab(s), Oral, qid, # 120 EA, 0 Refill(s), Type: Soft Stop, Pharmacy: [facility], 1 tab(s) Oral qid  propranolol 120 mg oral capsule, extended release: = 1 cap(s) ( 120 mg ), Oral, daily, # 90 cap(s), 1 Refill(s), Type: Maintenance, Pharmacy: [facility], 1 cap(s) Oral daily  raNITIdine 150 mg oral capsule: = 1 cap(s) ( 150 mg ), PO, daily, # 90 cap(s), 1 Refill(s), Type: Maintenance, Pharmacy: [facility], 1 cap(s) Oral daily   Problem list:    All Problems (Selected)  Benign familial tremor / SNOMED CT 0122316979 / Confirmed  Cartilage tear / ICD-9-.9 / Confirmed  Chronic Lumbar Pain / ICD-9-.2 / Confirmed  Chronic pain / SNOMED CT 321719163 / Confirmed  Hypothyroidism / SNOMED CT 26746660 / Confirmed  Insomnia / ICD-9-.52 / Confirmed  Low testosterone in male / SNOMED CT 284603065 / Confirmed  Migraine Headache / ICD-9-.90 / Confirmed  Mild Episode of Depression / ICD-9-.31 / Confirmed  Moderate major depression / SNOMED CT 0325453 / Confirmed  Morbid obesity / SNOMED CT 535587707 / Confirmed  Obesity / ICD-9-.00 / Probable  Onychomycosis / SNOMED CT 8386784974 / Confirmed  PUD (Peptic Ulcer Disease) / ICD-9-.90 / Confirmed  Type 2 diabetes mellitus / SNOMED CT 422093483 / Confirmed      Histories   Past Medical History:    Active  PUD (Peptic Ulcer Disease) (533.90)  Chronic Lumbar Pain  (724.2)  Onychomycosis (4202550105)  Mild Episode of Depression (296.31)  Obesity (278.00)  Cartilage tear (848.9)  Migraine Headache (346.90)  Insomnia (780.52)  Resolved  *Hospitalized@[facility] - Pneumonia: Onset on 2/14/2015 at 39 years.  Resolved on 2/19/2015 at 39 years.  Tobacco user (305.1):  Resolved.   Family History:       Procedure history:    Spinal fusion (SNOMED CT 61566679) performed by Freddy Duvall MD on 8/22/2017 at 41 Years.  Comments:  8/31/2017 8:02 AM CDT - Ashley Amaro  L4-5.  Fusion L5-S1 in 2015 at 40 Years.  Comments:  2/3/2015 8:49 AM Floyd Raines MD  02/05/2015  [facility]  Dr. Rios  Bilateral L5-S1 Decompression Foramina in 2014 at 39 Years.  Comments:  4/29/2014 3:20 PM Floyd Mayo MD, Dr.  [facility]  05/06/2014    Vasectomy (SNOMED CT 57699972) on 7/26/2012 at 36 Years.  Arthroscopy (SNOMED CT 05390162) in 2011 at 36 Years.  Comments:  7/11/2012 8:59 AM [facility]  Bilateral knee  Polysomnogram (SNOMED CT 775777385) on 8/14/2009 at 33 Years.  Comments:  7/11/2012 9:09 AM [facility]  No significant sleep-disorder findings.  Bruxism.  Follow-up indicated.  Tonsillectomy (SNOMED CT 680313402) in 1980 at 5 Years.   Social History:        Alcohol Assessment: Denies Alcohol Use            Never      Tobacco Assessment: Past            Past, Cigarettes, 4 per day.                     Comments:                      04/29/2014 - Floyd Baker MD                     Quit 04/01/2014      Substance Abuse Assessment            Never      Employment and Education Assessment            Employed                     Comments:                      04/29/2014 - Floyd Baker MD                     [employer]      Home and Environment Assessment            Marital status:  (Living together).  Lives with Self, Children, Spouse.  Living situation:               Home/Independent.      Exercise and Physical Activity  Assessment: Does not exercise            Exercise frequency: No regular exercise..        Physical Examination   Vital Signs   9/5/2019 8:23 AM CDT Peripheral Pulse Rate 104 bpm  HI    Systolic Blood Pressure 138 mmHg  HI    Diastolic Blood Pressure 88 mmHg  HI    Mean Arterial Pressure 105 mmHg    Oxygen Saturation 98 %      Measurements from flowsheet : Measurements   9/5/2019 8:23 AM CDT Height Measured - Standard 70 in    Weight Measured - Standard 288 lb    BSA 2.54 m2    Body Mass Index 41.32 kg/m2  HI      General:  No acute distress.    Neck:  Supple, No lymphadenopathy, No thyromegaly.    Respiratory:  Lungs are clear to auscultation, Respirations are non-labored, Breath sounds are equal, Symmetrical chest wall expansion.    Cardiovascular:  Normal rate, Regular rhythm, No murmur, No gallop, Good pulses equal in all extremities, Normal peripheral perfusion, No edema.    Integumentary:  Warm, Dry, Pink.    Neurologic:  Alert, Oriented, fine symmetrical tremor improved since last visit.    Psychiatric:  Cooperative, Appropriate mood & affect.

## 2022-02-15 NOTE — NURSING NOTE
Vital Signs Entered On:  9/30/2019 1:04 PM CDT    Performed On:  9/30/2019 1:04 PM CDT by Sophia Choe               Vital Signs   Systolic Blood Pressure :   126 mmHg   Diastolic Blood Pressure :   88 mmHg (HI)    Mean Arterial Pressure :   101 mmHg   Vital Signs Comments :   css bp check   Sophia Choe - 9/30/2019 1:04 PM CDT

## 2022-02-15 NOTE — PROGRESS NOTES
Patient:   PEABODY, TIMOTHY J            MRN: [id number]            FIN: [id number]               Age:   41 years     Sex:  Male     :  1975   Associated Diagnoses:   Chronic Lumbar Pain   Author:   Samuel AMEZCUA, Floyd      Impression and Plan   Diagnosis     Chronic Lumbar Pain (CBB26-WO M54.5).     Course:  Progressing as expected.    Plan:  1. Condition unchanged and stable  2. Compliant with treatment plan and no evidence of abberant opioid behavior  1.Controlled Substance Agreement Contract discussed and up to date.  2. Has had urine drug screen within the past year.  3. Discussed non-opioid treatment options and overall treatment plan.  4. Reviewed controlled substance dispensing history  5. Discussed side effects including: addictive potential, death, tolerance, hyperalgesia, cognitive dysfunction, etc.  6. Discussed progress toward meeting functional goals  7. Total opioid dose less than 90 mg Morphine equivalents  8. Informed consent for COT reviewed and UTD.    Summary:  had GE recently with his spine specialist.  Is also starting another course of physical therapy..    Orders     Orders   Charges (Evaluation and Management):  60694 office outpatient visit 15 minutes (Charge) (Order): Quantity: 1, Chronic Lumbar Pain.     Orders (Selected)   Prescriptions  Prescribed  cyclobenzaprine 10 mg oral tablet: 1 tab(s) ( 10 mg ), PO, q6 hrs, PRN: for spasm, # 50 tab(s), 1 Refill(s), Type: Maintenance, Pharmacy: [facility], 1 tab(s) po q6 hrs,PRN:for spasm  lidocaine 5% topical film: 1 patch(es), top, daily, # 30 patch(es), 1 Refill(s), Type: Maintenance, Pharmacy: [facility], 1 patch(es) top daily  naproxen 500 mg oral tablet: 1 tab(s) ( 500 mg ), PO, tid, # 90 tab(s), 5 Refill(s), Type: Maintenance, Pharmacy: [facility], 1 tab(s) po tid  oxyCODONE 10 mg oral tablet: 1 tab(s) ( 10 mg ), po, qid, # 120 tab(s), 0 Refill(s), Type: Maintenance, Pharmacy: [facility], 1 tab(s) po  qid  Documented Medications  Documented  gabapentin: ( 300 mg ), po, tid, 0 Refill(s), Type: Maintenance.        Visit Information   Visit type:  Scheduled follow-up.    Accompanied by:  No one.    Source of history:  Self.    Referral source:  Self.    History limitation:  None.       Chief Complaint   Chief complaint discussed and confirmed correct.     2/14/2017 3:41 PM CST    Pt here for med ck        History of Present Illness             The patient presents with back pain.  The back pain is located on both sides and lumbar region.  The back pain is described as aching and cramping.  The severity of the back pain is severe.  The back pain is constant.  The back pain has lasted for years.  Radiation of pain: none.  The context of the back pain: occurred after exertion and in association with work.  Exacerbating factors consist of movement.  Relieving factors consist of medication and rest.  Associated symptoms consist of denies bladder dysfunction, denies bowel dysfunction, denies decreased sensation, denies saddle numbness, denies abdominal pain, denies chills, denies fever, denies dysuria and denies gait disturbance.  Prior treatment consists of physical therapy, chiropracter, GE and lumbar surgery.  Notes.        Review of Systems   Constitutional:  Negative.    Eye:  Negative.    Ear/Nose/Mouth/Throat:  Negative.    Respiratory:  Negative.    Cardiovascular:  Negative.    Gastrointestinal:  Negative.    Genitourinary:  Negative.    Hematology/Lymphatics:  Negative.    Endocrine:  Negative.    Immunologic:  Negative.    Musculoskeletal:  Negative except as documented in history of present illness.    Integumentary:  Negative.    Neurologic:  Negative.    Psychiatric:  Negative.    All other systems reviewed and negative      Health Status   Allergies:    Nonallergic Reactions (Selected)  Severe  Doxycycline (Vomiting)   Medications:  (Selected)   Prescriptions  Prescribed  SUMAtriptan 100 mg oral tablet: 1  tab(s) ( 100 mg ), PO, Once, PRN: for migraine headache, # 9 tab(s), 1 Refill(s), Type: Soft Stop, Pharmacy: [facility], 1 tab(s) po once,PRN:for migraine headache  cyclobenzaprine 10 mg oral tablet: 1 tab(s) ( 10 mg ), PO, q6 hrs, PRN: for spasm, # 50 tab(s), 1 Refill(s), Type: Maintenance, Pharmacy: [facility], 1 tab(s) po q6 hrs,PRN:for spasm  lidocaine 5% topical film: 1 patch(es), top, daily, # 30 patch(es), 1 Refill(s), Type: Maintenance, Pharmacy: [facility], 1 patch(es) top daily  naproxen 500 mg oral tablet: 1 tab(s) ( 500 mg ), PO, tid, # 90 tab(s), 5 Refill(s), Type: Maintenance, Pharmacy: [facility], 1 tab(s) po tid  oxyCODONE 10 mg oral tablet: 1 tab(s) ( 10 mg ), po, qid, # 120 tab(s), 0 Refill(s), Type: Maintenance, Pharmacy: [facility], 1 tab(s) po qid  ranitidine 150 mg oral tablet: 1 tab(s) ( 150 mg ), PO, BID, # 60 tab(s), 0 Refill(s), Type: Maintenance, OTC (Rx)  terbinafine 250 mg oral tablet: 1 tab(s) ( 250 mg ), PO, Daily, # 90 tab(s), 0 Refill(s), Type: Maintenance, Pharmacy: [facility], 1 tab(s) po daily  traZODone 100 mg oral tablet: 1 tab(s) ( 100 mg ), PO, hs, # 30 tab(s), 5 Refill(s), Type: Maintenance, Pharmacy: [facility], 1 tab(s) po hs  Documented Medications  Documented  gabapentin: ( 300 mg ), po, tid, 0 Refill(s), Type: Maintenance   Problem list:    All Problems  Cartilage tear / ICD-9-.9 / Confirmed  Chronic Lumbar Pain / ICD-9-.2 / Confirmed  Insomnia / ICD-9-.52 / Confirmed  Migraine Headache / ICD-9-.90 / Confirmed  Mild Episode of Depression / ICD-9-.31 / Confirmed  Obesity / ICD-9-.00 / Probable  Onychomycosis / SNOMED CT 9567544353 / Confirmed  PUD (Peptic Ulcer Disease) / ICD-9-.90 / Confirmed  Resolved: *[facility]ospitalized@[facility] - Pneumonia  Resolved: Tobacco user / ICD-9-.1      Histories   Past Medical History:    Active  PUD (Peptic Ulcer Disease) (533.90)  Chronic Lumbar  Pain (724.2)  Onychomycosis (8030438306)  Mild Episode of Depression (296.31)  Obesity (278.00)  Cartilage tear (848.9)  Migraine Headache (346.90)  Insomnia (780.52)  Resolved  *Hospitalized@[facility] - Pneumonia: Onset on 2/14/2015 at 39 years.  Resolved on 2/19/2015 at 39 years.  Tobacco user (305.1):  Resolved.   Family History:       Procedure history:    Fusion L5-S1 in 2015 at 40 Years.  Comments:  2/3/2015 8:49 AM - Floyd Baker MD  02/05/2015  [facility]  Dr. Rios  Bilateral L5-S1 Decompression Foramina in 2014 at 39 Years.  Comments:  4/29/2014 3:20 PM - Floyd Baker MD, Dr.  [facility]  05/06/2014    Vasectomy (SNOMED CT 93306680) on 7/26/2012 at 36 Years.  Arthroscopy (SNOMED CT 33243955) in 2011 at 36 Years.  Comments:  7/11/2012 8:59 AM - Miracle Griffiths  Bilateral knee  Polysomnogram (SNOMED CT 806798035) on 8/14/2009 at 33 Years.  Comments:  7/11/2012 9:09 AM - Miracle Griffiths  No significant sleep-disorder findings.  Bruxism.  Follow-up indicated.  Tonsillectomy (SNOMED CT 792150856) in 1980 at 5 Years.   Social History:        Alcohol Assessment: Denies Alcohol Use            Never      Tobacco Assessment: Past            Past, Cigarettes, 4 per day.                     Comments:                      04/29/2014 - Floyd Baker MD                     Quit 04/01/2014      Substance Abuse Assessment            Never      Employment and Education Assessment            Employed                     Comments:                      04/29/2014 - Floyd Baker MD                     [employer]      Home and Environment Assessment            Marital status:  (Living together).  Lives with Self, Children, Spouse.  Living situation:               Home/Independent.      Exercise and Physical Activity Assessment: Does not exercise            Exercise frequency: No regular exercise..        Physical Examination   Vital signs reviewed  and within acceptable limits    Vital  Signs   2/14/2017 3:41 PM CST Peripheral Pulse Rate 112 bpm  HI    Pulse Site Radial artery    HR Method Manual    Systolic Blood Pressure 142 mmHg  HI    Diastolic Blood Pressure 90 mmHg    Mean Arterial Pressure 107 mmHg    BP Site Right arm    BP Method Manual      Measurements from flowsheet : Measurements   2/14/2017 3:41 PM CST Height Measured - Standard 70 in    Weight Measured - Standard 289.4 lb    BSA 2.54 m2    Body Mass Index 41.52 kg/m2      General:  Moderate distress.    Respiratory:  Lungs are clear to auscultation, Respirations are non-labored.    Cardiovascular:  Normal rate, Regular rhythm.    Musculoskeletal:       Spine/torso exam: Lumbar ( Bilateral, No deformity, No erythema, No ecchymosis, No swelling, Tenderness, Range of motion ( Diminished ), Straight leg raising, supine ( Negative ) ).    Integumentary:  Warm, Dry, Pink, lumbar vertical scar.    Neurologic:  Alert, Oriented, Normal sensory, Normal motor function, No focal deficits, Normal deep tendon reflexes.    Psychiatric:  Cooperative, Appropriate mood & affect, Normal judgment.

## 2022-02-15 NOTE — PROGRESS NOTES
Patient:   PEABODY, TIMOTHY J            MRN: [id number]            FIN: [id number]               Age:   44 years     Sex:  Male     :  1975   Associated Diagnoses:   Chronic Lumbar Pain   Author:   Floyd Baker MD      Impression and Plan   Diagnosis     Chronic Lumbar Pain (JNS49-YH M54.5).     Course:  Improving.    Orders     Orders   Charges (Evaluation and Management):  37354 physician telephone evaluation 11-20 min (Charge) (Order): Quantity: 1, Chronic Lumbar Pain.     Orders (Selected)   Prescriptions  Prescribed  oxyCODONE 10 mg oral tablet: = 1 tab(s), Oral, qid, # 120 EA, 0 Refill(s), Type: Soft Stop, Pharmacy: [facility], 1 tab(s) Oral qid.        Visit Information      Date of Service: 2020 07:39 am  Performing Location: [facility]  Encounter#: [id number]      Primary Care Provider (PCP):  Floyd Baker MD    NPI# [id number]   Visit type:  Telephone Encounter.    Source of history:  Self.    Location of patient:  _home  Call Start Time:   _820  Call End Time:    _840   Referral source:  Self.    History limitation:  None.       Chief Complaint   Patient needs routine refill of pain medication   2020 8:03 AM CDT    Pain management med check; Verbal permission for telephone visit     _      History of Present Illness   Today's visit was conducted via telephone due to the COVID-19 pandemic.     Reason for visit:  _Needs monthly refill of Oxycodone.  Has been working with Physical Therapist which has been helpful.  PDMP reviewed.  No evidence of misuse.  Pain contract signed previously.      Review of Systems   Constitutional:  Negative.    Eye:  Negative.    Ear/Nose/Mouth/Throat:  Negative.    Respiratory:  Negative.    Cardiovascular:  Negative.    Gastrointestinal:  Negative.    Genitourinary:  Negative.    Hematology/Lymphatics:  Negative.    Endocrine:  Negative.    Immunologic:  Negative.    Musculoskeletal:  Negative.    Integumentary:  Negative.     Neurologic:  Negative.    Psychiatric:  Negative.    All other systems reviewed and negative      Health Status   Allergies:    Nonallergic Reactions (Selected)  Severe  Doxycycline (Vomiting)   Medications:  (Selected)   Prescriptions  Prescribed  DULoxetine 60 mg oral delayed release capsule: = 2 cap(s) ( 120 mg ), Oral, daily, # 180 cap(s), 1 Refill(s), Type: Maintenance, Pharmacy: [facility]  SUMAtriptan 100 mg oral tablet: = 1 tab(s) ( 100 mg ), PO, Once, PRN: for migraine headache, # 9 tab(s), 5 Refill(s), Type: Soft Stop, Pharmacy: [facility], 1 tab(s) Oral once,PRN:for migraine headache  Testosterone Cypionate 200 mg/mL intramuscular solution: See Instructions, Instructions: Inject intramuscular half (1/2) milliliter(ML) TWICE WEEKLY MONDAY/FRIDAY, # 10 mL, 1 Refill(s), Type: Soft Stop, Pharmacy: [facility], Inject intramuscular half (1/2) milliliter(ML) TWICE WEEKLY MONDAY/FRIDAY  amitriptyline 25 mg oral tablet: = 1 tab(s) ( 25 mg ), PO, hs, # 90 tab(s), 1 Refill(s), Type: Maintenance, Pharmacy: [facility], 1 tab(s) Oral hs  atorvastatin 20 mg oral tablet: = 1 tab(s) ( 20 mg ), PO, Daily, # 90 tab(s), 1 Refill(s), Type: Maintenance, Pharmacy: [facility], 1 tab(s) Oral daily  levothyroxine 25 mcg (0.025 mg) oral tablet: = 1 tab(s) ( 25 mcg ), Oral, daily, # 90 tab(s), 1 Refill(s), ARLET, Type: Maintenance, Pharmacy: [facility], 1 tab(s) Oral daily  metFORMIN 500 mg oral tablet: = 1 tab(s), Oral, bidwm, # 180 tab(s), 1 Refill(s), Type: Maintenance, Pharmacy: [facility], 1 tab(s) Oral bidwm  oxyCODONE 10 mg oral tablet: = 1 tab(s), Oral, qid, # 120 EA, 0 Refill(s), Type: Soft Stop, Pharmacy: [facility], 1 tab(s) Oral qid  tadalafil 10 mg oral tablet: See Instructions, Instructions: 1 tab(s) Oral daily PRN planned sexual activity, # 10 tab(s), 2 Refill(s), Type: Maintenance, Pharmacy: [facility], 1 tab(s) Oral daily PRN planned sexual  activity,    Medications          *denotes recorded medication          DULoxetine 60 mg oral delayed release capsule: 120 mg, 2 cap(s), Oral, daily, 180 cap(s), 1 Refill(s).          SUMAtriptan 100 mg oral tablet: 100 mg, 1 tab(s), PO, Once, PRN: for migraine headache, 9 tab(s), 5 Refill(s).          amitriptyline 25 mg oral tablet: 25 mg, 1 tab(s), PO, hs, 90 tab(s), 1 Refill(s).          atorvastatin 20 mg oral tablet: 20 mg, 1 tab(s), PO, Daily, 90 tab(s), 1 Refill(s).          levothyroxine 25 mcg (0.025 mg) oral tablet: 25 mcg, 1 tab(s), Oral, daily, 90 tab(s), 1 Refill(s).          metFORMIN 500 mg oral tablet: 1 tab(s), Oral, bidwm, 180 tab(s), 1 Refill(s).          oxyCODONE 10 mg oral tablet: 1 tab(s), Oral, qid, 120 EA, 0 Refill(s).          tadalafil 10 mg oral tablet: See Instructions, 1 tab(s) Oral daily PRN planned sexual activity, 10 tab(s), 2 Refill(s).          Testosterone Cypionate 200 mg/mL intramuscular solution: See Instructions, Inject intramuscular half (1/2) milliliter(ML) TWICE WEEKLY MONDAY/FRIDAY, 10 mL, 1 Refill(s).       Problem list:    All Problems  Benign essential HTN / SNOMED CT 3982267 / Confirmed  Benign familial tremor / SNOMED CT 0985478380 / Confirmed  Cartilage tear / ICD-9-.9 / Confirmed  Chronic Lumbar Pain / ICD-9-.2 / Confirmed  Chronic pain / SNOMED CT 164298320 / Confirmed  Hypothyroidism / SNOMED CT 91962011 / Confirmed  Insomnia / ICD-9-.52 / Confirmed  Low testosterone in male / SNOMED CT 341160649 / Confirmed  Migraine Headache / ICD-9-.90 / Confirmed  Mild Episode of Depression / ICD-9-.31 / Confirmed  Moderate major depression / SNOMED CT 8485982 / Confirmed  Morbid obesity / SNOMED CT 427559602 / Confirmed  Obesity / ICD-9-.00 / Probable  Onychomycosis / SNOMED CT 9178658671 / Confirmed  PUD (Peptic Ulcer Disease) / ICD-9-.90 / Confirmed  Type 2 diabetes mellitus / SNOMED CT 915497180 / Confirmed  Resolved:  *Hospitalized@[facility] - Pneumonia  Resolved: Tobacco user / ICD-9-.1      Histories   Past Medical History:    Active  PUD (Peptic Ulcer Disease) (533.90)  Chronic Lumbar Pain (724.2)  Onychomycosis (9393132505)  Mild Episode of Depression (296.31)  Obesity (278.00)  Cartilage tear (848.9)  Migraine Headache (346.90)  Insomnia (780.52)  Resolved  *Hospitalized@[facility] - Pneumonia: Onset on 2/14/2015 at 39 years.  Resolved on 2/19/2015 at 39 years.  Tobacco user (305.1):  Resolved.   Family History:       Procedure history:    Spinal fusion (SNOMED CT 59929086) performed by Freddy Duvall MD on 8/22/2017 at 41 Years.  Comments:  8/31/2017 8:02 AM DERRICK - Ashley Amaro  L4-5.  Fusion L5-S1 in 2015 at 40 Years.  Comments:  2/3/2015 8:49 AM Floyd Raines MD  02/05/2015  [facility]  Dr. Rios  Bilateral L5-S1 Decompression Foramina in 2014 at 39 Years.  Comments:  4/29/2014 3:20 PM Floyd Mayo MD, Dr.  [facility]  05/06/2014    Vasectomy (SNOMED CT 50274605) on 7/26/2012 at 36 Years.  Arthroscopy (SNOMED CT 26502807) in 2011 at 36 Years.  Comments:  7/11/2012 8:59 AM Miracle White  Bilateral knee  Polysomnogram (SNOMED CT 247524802) on 8/14/2009 at 33 Years.  Comments:  7/11/2012 9:09 AM Miracle White  No significant sleep-disorder findings.  Bruxism.  Follow-up indicated.  Tonsillectomy (SNOMED CT 470139542) in 1980 at 5 Years.   Social History:        Alcohol Assessment: Denies Alcohol Use            Never      Tobacco Assessment: Past            Past, Cigarettes, 4 per day.                     Comments:                      04/29/2014 - Floyd Baker MD                     Quit 04/01/2014      Substance Abuse Assessment            Never      Employment and Education Assessment            Employed                     Comments:                      04/29/2014 - Floyd Baker MD                     [employer]      Home and Environment Assessment             Marital status:  (Living together).  Lives with Self, Children, Spouse.  Living situation:               Home/Independent.      Exercise and Physical Activity Assessment: Does not exercise            Exercise frequency: No regular exercise..        Physical Examination   General:  Alert and oriented, No acute distress.    Respiratory:  Respirations are non-labored.    Psychiatric:  Cooperative, Appropriate mood & affect, Normal judgment.

## 2022-02-15 NOTE — NURSING NOTE
Comprehensive Intake Entered On:  2/18/2021 10:26 AM CST    Performed On:  2/18/2021 10:21 AM CST by Crissy Kim CMA               Summary   Chief Complaint :   Pt here for med ck   Weight Measured :   288 lb(Converted to: 288 lb 0 oz, 130.635 kg)    Height Measured :   70 in(Converted to: 5 ft 10 in, 177.80 cm)    Body Mass Index :   41.32 kg/m2 (HI)    Body Surface Area :   2.54 m2   Systolic Blood Pressure :   132 mmHg (HI)    Diastolic Blood Pressure :   88 mmHg (HI)    Mean Arterial Pressure :   103 mmHg   Peripheral Pulse Rate :   112 bpm (HI)    Oxygen Saturation :   96 %   Crissy Kim CMA - 2/18/2021 10:21 AM CST   Health Status   Allergies Verified? :   Yes   Medication History Verified? :   Yes   Medical History Verified? :   Yes   Pre-Visit Planning Status :   Completed   Tobacco Use? :   Former smoker   Crissy Kim CMA - 2/18/2021 10:21 AM CST   Consents   Consent for Immunization Exchange :   Consent Granted   Consent for Immunizations to Providers :   Consent Granted   Crissy Kim CMA - 2/18/2021 10:21 AM CST   Meds / Allergies   (As Of: 2/18/2021 10:26:29 AM CST)   Allergies (Active)   doxycycline  Estimated Onset Date:   <not entered> 2013 ; Reactions:   Vomiting ; Created By:   Floyd Baker MD; Reaction Status:   Active ; Category:   Drug ; Substance:   doxycycline ; Type:   Intolerance ; Severity:   Severe ; Updated By:   Floyd Baker MD; Reviewed Date:   2/18/2021 10:24 AM CST        Medication List   (As Of: 2/18/2021 10:26:29 AM CST)   Prescription/Discharge Order    amitriptyline  :   amitriptyline ; Status:   Prescribed ; Ordered As Mnemonic:   amitriptyline 25 mg oral tablet ; Simple Display Line:   75 mg, 3 tab(s), PO, hs, 90 tab(s), 5 Refill(s) ; Ordering Provider:   Floyd Baker MD; Catalog Code:   amitriptyline ; Order Dt/Tm:   10/21/2020 10:09:25 AM CDT          amphetamine-dextroamphetamine  :   amphetamine-dextroamphetamine ; Status:   Prescribed ; Ordered As  Mnemonic:   amphetamine-dextroamphetamine 10 mg oral tablet ; Simple Display Line:   10 mg, 1 tab(s), Oral, tid, 90 tab(s), 0 Refill(s) ; Ordering Provider:   Floyd Baker MD; Catalog Code:   amphetamine-dextroamphetamine ; Order Dt/Tm:   12/14/2020 8:05:37 AM CST          atorvastatin  :   atorvastatin ; Status:   Prescribed ; Ordered As Mnemonic:   atorvastatin 20 mg oral tablet ; Simple Display Line:   20 mg, 1 tab(s), PO, Daily, 90 tab(s), 1 Refill(s) ; Ordering Provider:   Floyd Baker MD; Catalog Code:   atorvastatin ; Order Dt/Tm:   10/21/2020 10:09:50 AM CDT          DULoxetine  :   DULoxetine ; Status:   Prescribed ; Ordered As Mnemonic:   DULoxetine 60 mg oral delayed release capsule ; Simple Display Line:   120 mg, 2 cap(s), Oral, daily, 180 cap(s), 1 Refill(s) ; Ordering Provider:   Floyd Baker MD; Catalog Code:   DULoxetine ; Order Dt/Tm:   10/21/2020 10:08:56 AM CDT          levothyroxine  :   levothyroxine ; Status:   Prescribed ; Ordered As Mnemonic:   levothyroxine 25 mcg (0.025 mg) oral tablet ; Simple Display Line:   25 mcg, 1 tab(s), Oral, daily, 90 tab(s), 1 Refill(s) ; Ordering Provider:   Floyd Baker MD; Catalog Code:   levothyroxine ; Order Dt/Tm:   8/12/2020 9:33:04 AM CDT          metFORMIN  :   metFORMIN ; Status:   Prescribed ; Ordered As Mnemonic:   metFORMIN 500 mg oral tablet ; Simple Display Line:   1 tab(s), Oral, bid, WM., 180 tab(s), 1 Refill(s) ; Ordering Provider:   Floyd Baker MD; Catalog Code:   metFORMIN ; Order Dt/Tm:   9/14/2020 9:31:15 AM CDT          oxyCODONE  :   oxyCODONE ; Status:   Prescribed ; Ordered As Mnemonic:   oxyCODONE 10 mg oral tablet ; Simple Display Line:   1 tab(s), Oral, qid, 120 EA, 0 Refill(s) ; Ordering Provider:   Floyd Baker MD; Catalog Code:   oxyCODONE ; Order Dt/Tm:   2/2/2021 7:39:29 AM CST          SUMAtriptan  :   SUMAtriptan ; Status:   Prescribed ; Ordered As Mnemonic:   SUMAtriptan 100 mg oral tablet ; Simple Display Line:    See Instructions, TAKE ONE (1) TABLET BY MOUTH ONCE AS NEEDED FOR MIGRAINE HEADACHE, 9 tab(s), 4 Refill(s) ; Ordering Provider:   Floyd Baker MD; Catalog Code:   SUMAtriptan ; Order Dt/Tm:   11/9/2020 10:54:53 AM CST          tadalafil  :   tadalafil ; Status:   Processing ; Ordered As Mnemonic:   tadalafil 10 mg oral tablet ; Ordering Provider:   Floyd Baker MD; Action Display:   Complete ; Catalog Code:   tadalafil ; Order Dt/Tm:   2/18/2021 10:24:00 AM CST          testosterone  :   testosterone ; Status:   Prescribed ; Ordered As Mnemonic:   Testosterone Cypionate 200 mg/mL intramuscular solution ; Simple Display Line:   See Instructions, Inject intramuscular 0.5 milliliter  ONCE weekly, 10 mL, 1 Refill(s) ; Ordering Provider:   Floyd Baker MD; Catalog Code:   testosterone ; Order Dt/Tm:   10/21/2020 10:02:11 AM CDT          tiZANidine  :   tiZANidine ; Status:   Prescribed ; Ordered As Mnemonic:   tiZANidine 4 mg oral tablet ; Simple Display Line:   8 mg, 2 tab(s), Oral, tid, 60 tab(s), 1 Refill(s) ; Ordering Provider:   Floyd Baker MD; Catalog Code:   tiZANidine ; Order Dt/Tm:   10/21/2020 10:09:14 AM CDT            ID Risk Screen   Recent Travel History :   No recent travel   Family Member Travel History :   No recent travel   Other Exposure to Infectious Disease :   Unknown   COVID-19 Testing Status :   No COVID-19 test performed   Crissy Kim CMA 2/18/2021 10:21 AM CST

## 2022-02-15 NOTE — TELEPHONE ENCOUNTER
---------------------  From: Crissy Kim CMA   To: Floyd Baker MD;     Sent: 11/19/2019 10:25:48 AM CST  Subject: oxy refill     Pt is leaving on Thursday to go hunting out of town for a week and is due to refill his oxy next Monday or Tuesday... He is requesting to be able to refill it earlier before he leaves town.      Also, he had CT scan yesterday which was inconclusive so he is having an MRI today      [place]  [phone]

## 2022-02-15 NOTE — TELEPHONE ENCOUNTER
---------------------  From: Crissy Kim CMA   To: Floyd Baker MD;     Sent: 3/17/2020 11:48:30 AM CDT  Subject: refill-oxycodone     PCP:   [facility]    Medication:   oxycodone  Last Filled:  20    pt doesnt need early refill just would like to request for it to be at the pharmacy    Quantity:  _  Refills:  _      Date of last office visit and reason:          Note:  pt requesting refill    Pharmacy: [facility]    Resource:   [facility]  Phone:   [phone]

## 2022-02-15 NOTE — NURSING NOTE
Comprehensive Intake Entered On:  12/9/2019 1:27 PM CST    Performed On:  12/9/2019 1:24 PM CST by Crissy Kim CMA               Summary   Chief Complaint :   Pt here for ear pain   Weight Measured :   288 lb(Converted to: 288 lb 0 oz, 130.63 kg)    Height Measured :   70 in(Converted to: 5 ft 10 in, 177.80 cm)    Body Mass Index :   41.32 kg/m2 (HI)    Body Surface Area :   2.54 m2   Systolic Blood Pressure :   144 mmHg (HI)    Diastolic Blood Pressure :   88 mmHg (HI)    Mean Arterial Pressure :   107 mmHg   Peripheral Pulse Rate :   91 bpm   Temperature Tympanic :   98.1 DegF(Converted to: 36.7 DegC)    Oxygen Saturation :   97 %   Crissy Kim CMA - 12/9/2019 1:24 PM CST   Health Status   Allergies Verified? :   Yes   Medication History Verified? :   Yes   Medical History Verified? :   Yes   Pre-Visit Planning Status :   Completed   Tobacco Use? :   Former smoker   Crissy Kim CMA - 12/9/2019 1:24 PM CST   Consents   Consent for Immunization Exchange :   Consent Granted   Consent for Immunizations to Providers :   Consent Granted   Crissy Kim CMA - 12/9/2019 1:24 PM CST   Meds / Allergies   (As Of: 12/9/2019 1:27:47 PM CST)   Allergies (Active)   doxycycline  Estimated Onset Date:   <not entered> 2013 ; Reactions:   Vomiting ; Created By:   Floyd Baker MD; Reaction Status:   Active ; Category:   Drug ; Substance:   doxycycline ; Type:   Intolerance ; Severity:   Severe ; Updated By:   Floyd Baker MD; Reviewed Date:   12/9/2019 1:25 PM CST        Medication List   (As Of: 12/9/2019 1:27:47 PM CST)   Prescription/Discharge Order    amitriptyline  :   amitriptyline ; Status:   Prescribed ; Ordered As Mnemonic:   amitriptyline 25 mg oral tablet ; Simple Display Line:   25 mg, 1 tab(s), PO, hs, 90 tab(s), 1 Refill(s) ; Ordering Provider:   Floyd Baker MD; Catalog Code:   amitriptyline ; Order Dt/Tm:   6/13/2019 4:10:00 PM CDT          atorvastatin  :   atorvastatin ; Status:   Prescribed  ; Ordered As Mnemonic:   atorvastatin 20 mg oral tablet ; Simple Display Line:   20 mg, 1 tab(s), PO, Daily, 90 tab(s), 1 Refill(s) ; Ordering Provider:   Floyd Baker MD; Catalog Code:   atorvastatin ; Order Dt/Tm:   6/13/2019 4:10:21 PM CDT          DULoxetine  :   DULoxetine ; Status:   Prescribed ; Ordered As Mnemonic:   DULoxetine 60 mg oral delayed release capsule ; Simple Display Line:   120 mg, 2 cap(s), po, daily, 180 cap(s), 1 Refill(s) ; Ordering Provider:   Floyd Baker MD; Catalog Code:   DULoxetine ; Order Dt/Tm:   6/13/2019 4:09:44 PM CDT          levothyroxine  :   levothyroxine ; Status:   Prescribed ; Ordered As Mnemonic:   levothyroxine 25 mcg (0.025 mg) oral tablet ; Simple Display Line:   25 mcg, 1 tab(s), Oral, daily, 90 tab(s), 1 Refill(s) ; Ordering Provider:   Floyd Baker MD; Catalog Code:   levothyroxine ; Order Dt/Tm:   9/5/2019 8:39:59 AM CDT          metFORMIN  :   metFORMIN ; Status:   Prescribed ; Ordered As Mnemonic:   metFORMIN 500 mg oral tablet ; Simple Display Line:   1 tab(s), Oral, bidwm, 180 tab(s), 1 Refill(s) ; Ordering Provider:   Floyd Baker MD; Catalog Code:   metFORMIN ; Order Dt/Tm:   6/13/2019 4:09:51 PM CDT          oxyCODONE  :   oxyCODONE ; Status:   Prescribed ; Ordered As Mnemonic:   oxyCODONE 10 mg oral tablet ; Simple Display Line:   10 mg, 1 tab(s), po, q6 hrs, 120 tab(s), 0 Refill(s) ; Ordering Provider:   Floyd Baker MD; Catalog Code:   oxyCODONE ; Order Dt/Tm:   11/19/2019 11:21:46 AM CST          oxyCODONE  :   oxyCODONE ; Status:   Prescribed ; Ordered As Mnemonic:   oxyCODONE 10 mg oral tablet ; Simple Display Line:   1 tab(s), Oral, qid, 120 EA, 0 Refill(s) ; Ordering Provider:   Floyd Baker MD; Catalog Code:   oxyCODONE ; Order Dt/Tm:   8/28/2019 3:55:15 PM CDT          raNITIdine  :   raNITIdine ; Status:   Prescribed ; Ordered As Mnemonic:   raNITIdine 150 mg oral capsule ; Simple Display Line:   150 mg, 1 cap(s), PO, daily, 90 cap(s),  1 Refill(s) ; Ordering Provider:   Floyd Baker MD; Catalog Code:   raNITIdine ; Order Dt/Tm:   6/13/2019 4:10:35 PM CDT          SUMAtriptan  :   SUMAtriptan ; Status:   Prescribed ; Ordered As Mnemonic:   SUMAtriptan 100 mg oral tablet ; Simple Display Line:   100 mg, 1 tab(s), PO, Once, PRN: for migraine headache, 9 tab(s), 5 Refill(s) ; Ordering Provider:   Floyd Baker MD; Catalog Code:   SUMAtriptan ; Order Dt/Tm:   6/13/2019 4:10:27 PM CDT          tadalafil  :   tadalafil ; Status:   Prescribed ; Ordered As Mnemonic:   tadalafil 10 mg oral tablet ; Simple Display Line:   See Instructions, 1 tab(s) Oral daily PRN planned sexual activity, 10 tab(s), 2 Refill(s) ; Ordering Provider:   Floyd Baker MD; Catalog Code:   tadalafil ; Order Dt/Tm:   11/6/2019 1:55:43 PM CST          testosterone  :   testosterone ; Status:   Prescribed ; Ordered As Mnemonic:   Testosterone Cypionate 200 mg/mL intramuscular solution ; Simple Display Line:   See Instructions, Inject intramuscular half (1/2) milliliter(ML) TWICE WEEKLY MONDAY/FRIDAY, 10 mL, 1 Refill(s) ; Ordering Provider:   Floyd Baker MD; Catalog Code:   testosterone ; Order Dt/Tm:   8/28/2019 3:55:34 PM CDT          varenicline  :   varenicline ; Status:   Prescribed ; Ordered As Mnemonic:   Chantix Continuing Month 1 mg oral tablet ; Simple Display Line:   See Instructions, USE AS DIRECTED BY PHYSICIAN, 53 EA, 3 Refill(s) ; Ordering Provider:   Floyd Baker MD; Catalog Code:   varenicline ; Order Dt/Tm:   11/1/2019 1:47:41 PM CDT

## 2022-02-15 NOTE — TELEPHONE ENCOUNTER
---------------------  From: Crissy Kim CMA (eRx Pool ([id number]_[place] [facility]))   To: Floyd Baker MD;     Sent: 1/3/2019 4:00:00 PM CST  Subject: FW: Medication Management   Due Date/Time: 1/4/2019 3:04:00 PM CST             ** Patient matched by Crissy Kim CMA on 1/3/2019 3:59:52 PM CST **      ------------------------------------------  From: [facility]  To: Floyd Baker MD  Sent: January 3, 2019 3:04:43 PM CST  Subject: Medication Management  Due: January 4, 2019 3:04:43 PM CST    ** On Hold Pending Signature **  Drug: oxyCODONE (oxyCODONE 10 mg oral tablet)  1 tab(s) Oral qid  Quantity: 120 tab(s)    Days Supply: 0         Refills: 0  Substitutions Allowed  Notes from Pharmacy:     Dispensed Drug: oxyCODONE (oxyCODONE 10 mg oral tablet)  Take one (1) tablet four times daily as needed for pain  Quantity: 120 EA      Days Supply: 0         Refills: 0  Substitutions Allowed  Notes from Pharmacy:   ---------------------------------------------------------------  From: Floyd Baker MD   To: [facility]    Sent: 1/3/2019 4:36:04 PM CST  Subject: FW: Medication Management     ** Approved **  oxyCODONE (OxyCODONE HCl Tablet 10 MG)  Take one (1) tablet four times daily as needed for pain  Qty:  120 EA        Days Supply:  0          ARLET     Route To Pharmacy - [facility]

## 2022-02-15 NOTE — NURSING NOTE
Depression Screening Entered On:  2/18/2021 11:07 AM CST    Performed On:  2/18/2021 11:06 AM CST by Crissy Kim CMA               Depression Screening   Little Interest - Pleasure in Activities :   Not at all   Feeling Down, Depressed, Hopeless :   Not at all   Initial Depression Screen Score :   0 Score   Poor Appetite or Overeating :   Not at all   Trouble Falling or Staying Asleep :   Several days   Feeling Tired or Little Energy :   Several days   Feeling Bad About Yourself :   Not at all   Trouble Concentrating :   Not at all   Moving or Speaking Slowly :   Not at all   Thoughts Better Off Dead or Hurting Self :   Not at all   Difficulty at Work, Home, Getting Along :   Not difficult at all   Detailed Depression Screen Score :   2    Total Depression Screen Score :   2    Crissy Kim CMA - 2/18/2021 11:06 AM CST

## 2022-02-15 NOTE — TELEPHONE ENCOUNTER
---------------------  From: Crissy Kim CMA (eRx Pool ([id number]_[facility]))   To: Floyd Baker MD;     Sent: 1/4/2021 2:25:11 PM CST  Subject: FW: Medication Management   Due Date/Time: 1/5/2021 2:07:00 PM CST           ------------------------------------------  From: [facility]  To: Floyd Baker MD  Sent: January 4, 2021 2:07:58 PM CST  Subject: Medication Management  Due: December 24, 2020 1:57:35 PM CST     ** On Hold Pending Signature **     Drug: oxyCODONE (oxyCODONE 10 mg oral tablet), ONE (1) TAB(S) ORAL FOUR TIMES DAILY  Quantity: 120 tab(s)  Days Supply: 30  Refills: 0  Substitutions Allowed  Notes from Pharmacy:     Dispensed Drug: oxyCODONE (oxyCODONE 10 mg oral tablet), ONE (1) TAB(S) ORAL FOUR TIMES DAILY  Quantity: 120 tab(s)  Days Supply: 30  Refills: 0  Substitutions Allowed  Notes from Pharmacy:  ------------------------------------------

## 2022-02-15 NOTE — TELEPHONE ENCOUNTER
Entered by Crissy Kim CMA on August 10, 2020 10:24:11 AM CDT  ---------------------  From: Crissy Kim CMA   To: [facility]    Sent: 8/10/2020 10:24:11 AM CDT  Subject: Medication Management     ** Submitted: **  Order:DULoxetine (DULoxetine 60 mg oral delayed release capsule)  2 cap(s)  Oral  daily  Qty:  180 cap(s)        Refills:  0          Substitutions Allowed     Route To [facility]    Signed by Crissy Kim CMA  8/10/2020 3:23:00 PM [facility]    ** Submitted: **  Complete:DULoxetine (DULoxetine 60 mg oral delayed release capsule)   Signed by Crissy Kim CMA  8/10/2020 3:24:00 PM [facility]    ** Not Approved:  **  DULoxetine (DULOXETINE DR 60MG)  TAKE TWO (2) CAPSULES BY MOUTH DAILY  Qty:  180 cap(s)        Days Supply:  90        Refills:  0          Substitutions Allowed     Route To [facility]   Signed by Crissy Kim CMA            ** Patient matched by Crissy Kim CMA on 8/10/2020 10:23:44 AM CDT **      ------------------------------------------  From: [facility]  To: Floyd Baker MD  Sent: August 6, 2020 3:01:30 PM CDT  Subject: Medication Management  Due: July 28, 2020 10:28:08 PM CDT     ** On Hold Pending Signature **     Drug: DULoxetine (DULoxetine 60 mg oral delayed release capsule), TAKE TWO (2) CAPSULES BY MOUTH DAILY  Quantity: 180 cap(s)  Days Supply: 90  Refills: 0  Substitutions Allowed  Notes from Pharmacy:     Dispensed Drug: DULoxetine (DULoxetine 60 mg oral delayed release capsule), TAKE TWO (2) CAPSULES BY MOUTH DAILY  Quantity: 180 cap(s)  Days Supply: 90  Refills: 0  Substitutions Allowed  Notes from Pharmacy:  ------------------------------------------

## 2022-02-15 NOTE — PROGRESS NOTES
Patient:   PEABODY, TIMOTHY J            MRN: [id number]            FIN: [id number]               Age:   43 years     Sex:  Male     :  1975   Associated Diagnoses:   Hypothyroidism; Moderate major depression; Type 2 diabetes mellitus; Low testosterone in male   Author:   Floyd Baker MD      Impression and Plan   Diagnosis     Hypothyroidism (LKL67-CF E03.9).     Course:  Progressing as expected.    Orders     Orders   Charges (Evaluation and Management):  45849 office outpatient visit 25 minutes (Charge) (Order): Quantity: 1, Low testosterone in male  Migraine Headache  Chronic Lumbar Pain  Chronic pain  Hypothyroidism.     Orders (Selected)   Prescriptions  Prescribed  levothyroxine 25 mcg (0.025 mg) oral tablet: = 1 tab(s) ( 25 mcg ), Oral, daily, # 90 tab(s), 1 Refill(s), ARLET, Type: Maintenance, Pharmacy: [facility], 1 tab(s) Oral daily.     Diagnosis     Moderate major depression (MVN24-WF F32.1).     Course:  Well controlled.    Orders     Orders (Selected)   Prescriptions  Prescribed  DULoxetine 60 mg oral delayed release capsule: = 2 cap(s) ( 120 mg ), po, daily, # 180 cap(s), 1 Refill(s), Type: Maintenance, Pharmacy: [facility], 2 cap(s) Oral daily  amitriptyline 25 mg oral tablet: = 1 tab(s) ( 25 mg ), PO, hs, # 90 tab(s), 1 Refill(s), Type: Maintenance, Pharmacy: [facility], 1 tab(s) Oral hs.     Diagnosis     Type 2 diabetes mellitus (IRS41-WV E11.9).     Course:  Well controlled.    Orders     Orders (Selected)   Prescriptions  Prescribed  metFORMIN 500 mg oral tablet: = 1 tab(s), Oral, bidwm, # 180 tab(s), 1 Refill(s), ARLET, Type: Maintenance, Pharmacy: [facility], 1 tab(s) Oral bidwm.     Diagnosis     Low testosterone in male (EPY14-SO R79.89).     Course:  Progressing as expected.    Orders     Orders (Selected)   Prescriptions  Prescribed  testosterone cypionate 200 mg/mL intramuscular solution: ( 100 mg ), IM, 2x/wk, # 10 mL, 1 Refill(s), Type:  Maintenance, Pharmacy: [facility], 100 mg IM 2x/wk.        Visit Information      Date of Service: 06/13/2019 03:55 pm  Performing Location: [facility]  Encounter#: [id number]      Primary Care Provider (PCP):  Floyd Baker MD# [id number]      Referring Provider:  Floyd Baker MD# [id number]   Visit type:  Scheduled follow-up.    Accompanied by:  No one.    Source of history:  Self.    Referral source:  Self.    History limitation:  None.       Chief Complaint   6/13/2019 3:56 PM CDT    Pt here for med ck        History of Present Illness             The patient presents for follow-up evaluation of diabetes.  The quality of the patient's diabetes is described as being unchanged from previous visit.  Relieving factors consist of diet changes, increased activity and medication.  Associated symptoms consist of none.  Medical encounters: none.  Compliance problems: none.  Glucose results: within target range.  Hemoglobin A1c results: within target range.  Lifestyle modification: weight reduction, diet, increased physical activity.  Medications: see medication list .  Additional pertinent history:.        Interval History   Hypothyroidism   none   .  Side effects of medication none.  Thyroid function tests within normal range.  The course is progressing as expected.  The effect on daily activities is no change in activity level, no change in sleeping patterns and no change in work/school duties.  Associated symptoms characterized by no fatigue, hair loss, weight gain, palpitations, constipation, joint pain, change in skin color, Altered mental status or tremor.     Depression   Side effects of medication unchanged.  The course is progressing as expected.  Compliance problems: none.  The effect on daily activities is no change in activity level, no change in eating habits and no change in sleeping patterns.  Medical encounters: none.  Associated symptoms characterized by no  insomnia, weight gain, weight loss, loss of appetite or suicidal thoughts.        Review of Systems   Constitutional:  Negative.    Eye:  Negative.    Ear/Nose/Mouth/Throat:  Negative.    Respiratory:  Negative.    Cardiovascular:  Negative.    Gastrointestinal:  Negative.    Genitourinary:  Negative.    Hematology/Lymphatics:  Negative.    Endocrine:  Negative.    Immunologic:  Negative.    Musculoskeletal:  Negative.    Integumentary:  Negative.    Neurologic:  Negative.    Psychiatric:  Negative.    All other systems reviewed and negative      Health Status   Allergies:    Nonallergic Reactions (Selected)  Severe  Doxycycline (Vomiting)   Medications:  (Selected)   Prescriptions  Prescribed  DULoxetine 60 mg oral delayed release capsule: = 2 cap(s) ( 120 mg ), po, daily, # 180 cap(s), 1 Refill(s), Type: Maintenance, Pharmacy: [facility], 2 cap(s) Oral daily  SUMAtriptan 100 mg oral tablet: = 1 tab(s) ( 100 mg ), PO, Once, PRN: for migraine headache, # 9 tab(s), 5 Refill(s), Type: Soft Stop, Pharmacy: [facility], 1 tab(s) Oral once,PRN:for migraine headache  amitriptyline 25 mg oral tablet: = 1 tab(s) ( 25 mg ), PO, hs, # 90 tab(s), 1 Refill(s), Type: Maintenance, Pharmacy: [facility], 1 tab(s) Oral hs  atorvastatin 20 mg oral tablet: = 1 tab(s) ( 20 mg ), PO, Daily, # 90 tab(s), 1 Refill(s), Type: Maintenance, Pharmacy: [facility], 1 tab(s) Oral daily  levothyroxine 25 mcg (0.025 mg) oral tablet: = 1 tab(s) ( 25 mcg ), Oral, daily, # 90 tab(s), 1 Refill(s), ARLET, Type: Maintenance, Pharmacy: [facility], 1 tab(s) Oral daily  metFORMIN 500 mg oral tablet: = 1 tab(s), Oral, bidwm, # 180 tab(s), 1 Refill(s), ARLET, Type: Maintenance, Pharmacy: [facility], 1 tab(s) Oral bidwm  oxyCODONE 10 mg oral tablet: = 1 tab(s), Oral, qid, # 120 EA, 0 Refill(s), Type: Soft Stop, Pharmacy: [facility], 1 tab(s) Oral qid  raNITIdine 150 mg oral capsule: = 1 cap(s) ( 150 mg ), PO,  daily, # 90 cap(s), 1 Refill(s), Type: Maintenance, Pharmacy: [facility], 1 cap(s) Oral daily  testosterone cypionate 200 mg/mL intramuscular solution: ( 100 mg ), IM, 2x/wk, # 10 mL, 1 Refill(s), Type: Maintenance, Pharmacy: [facility], 100 mg IM 2x/wk   Problem list:    All Problems  Cartilage tear / ICD-9-.9 / Confirmed  Chronic Lumbar Pain / ICD-9-.2 / Confirmed  Chronic pain / SNOMED CT 842547145 / Confirmed  Hypothyroidism / SNOMED CT 13812690 / Confirmed  Insomnia / ICD-9-.52 / Confirmed  Low testosterone in male / SNOMED CT 479884074 / Confirmed  Migraine Headache / ICD-9-.90 / Confirmed  Mild Episode of Depression / ICD-9-.31 / Confirmed  Moderate major depression / SNOMED CT 5607771 / Confirmed  Morbid obesity / SNOMED CT 879353341 / Confirmed  Obesity / ICD-9-.00 / Probable  Onychomycosis / SNOMED CT 7150583018 / Confirmed  PUD (Peptic Ulcer Disease) / ICD-9-.90 / Confirmed  Type 2 diabetes mellitus / SNOMED CT 307501429 / Confirmed  Resolved: *Hospitalized@[facility] - Pneumonia  Resolved: Tobacco user / ICD-9-.1      Histories   Past Medical History:    Active  PUD (Peptic Ulcer Disease) (533.90)  Chronic Lumbar Pain (724.2)  Onychomycosis (6517268448)  Mild Episode of Depression (296.31)  Obesity (278.00)  Cartilage tear (848.9)  Migraine Headache (346.90)  Insomnia (780.52)  Resolved  *Hospitalized@[facility] - Pneumonia: Onset on 2/14/2015 at 39 years.  Resolved on 2/19/2015 at 39 years.  Tobacco user (305.1):  Resolved.   Family History:       Procedure history:    Spinal fusion (SNOMED CT 71802419) performed by Freddy Duvall MD on 8/22/2017 at 41 Years.  Comments:  8/31/2017 8:02 AM CDT - Ashley Amaro  L4-5.  Fusion L5-S1 in 2015 at 40 Years.  Comments:  2/3/2015 8:49 AM RYNE - Floyd Baker MD  02/05/2015  [facility]  Dr. Rios  Bilateral L5-S1 Decompression Foramina in 2014 at 39 Years.  Comments:  4/29/2014 3:20 PM DERRICK Baker  MD, Floyd Rios  [facility]  05/06/2014    Vasectomy (SNOMED CT 15981845) on 7/26/2012 at 36 Years.  Arthroscopy (SNOMED CT 36572391) in 2011 at 36 Years.  Comments:  7/11/2012 8:59 AM RENUKAT - Miracle Griffiths  Bilateral knee  Polysomnogram (SNOMED CT 755199951) on 8/14/2009 at 33 Years.  Comments:  7/11/2012 9:09 AM RENUKAT - Miracle Griffiths  No significant sleep-disorder findings.  Bruxism.  Follow-up indicated.  Tonsillectomy (SNOMED CT 226383710) in 1980 at 5 Years.   Social History:        Alcohol Assessment: Denies Alcohol Use            Never      Tobacco Assessment: Past            Past, Cigarettes, 4 per day.                     Comments:                      04/29/2014 - Floyd Baker MD                     Quit 04/01/2014      Substance Abuse Assessment            Never      Employment and Education Assessment            Employed                     Comments:                      04/29/2014 - Floyd Baker MD                     [employer]      Home and Environment Assessment            Marital status:  (Living together).  Lives with Self, Children, Spouse.  Living situation:               Home/Independent.      Exercise and Physical Activity Assessment: Does not exercise            Exercise frequency: No regular exercise..      Physical Examination   Vital Signs   6/13/2019 3:56 PM CDT Peripheral Pulse Rate 93 bpm    Systolic Blood Pressure 130 mmHg    Diastolic Blood Pressure 88 mmHg  HI    Mean Arterial Pressure 102 mmHg    Oxygen Saturation 98 %      Measurements from flowsheet : Measurements   6/13/2019 3:56 PM CDT Height Measured - Standard 70 in    Weight Measured - Standard 299 lb    BSA 2.59 m2    Body Mass Index 42.9 kg/m2  HI      General:  Alert and oriented, No acute distress.    HENT:  Normocephalic.    Neck:  Supple, No lymphadenopathy, No thyromegaly.    Respiratory:  Lungs are clear to auscultation, Respirations are non-labored, Breath sounds are equal,  Symmetrical chest wall expansion.    Cardiovascular:  Normal rate, Regular rhythm, No murmur, No gallop, Good pulses equal in all extremities, Normal peripheral perfusion, No edema.    Gastrointestinal:  Soft, Non-tender, Non-distended, Normal bowel sounds, No organomegaly.    Musculoskeletal:  Normal range of motion, No swelling, No deformity, Normal gait.    Integumentary:  Warm, Dry, Pink, No foot ulcers or skin lesions..    Feet:  Normal by visual exam, Sensation intact, By monofilament exam.    Neurologic:  Alert, Oriented, Normal sensory, Normal motor function, No focal deficits.    Psychiatric:  Cooperative, Appropriate mood & affect.       Review / Management   Results review:  Lab results   4/9/2019 3:20 PM CDT Sodium Level 137 mmol/L    Potassium Level 4.5 mmol/L    Chloride Level 104 mmol/L    CO2 Level 23 mmol/L    Glucose Level 93 mg/dL    BUN 12 mg/dL    Creatinine 0.88 mg/dL    BUN/Creat Ratio NOT APPLICABLE    eGFR 105 mL/min/1.73m2    eGFR African American 122 mL/min/1.73m2    Calcium Level 9.5 mg/dL    Bili Total 0.8 mg/dL    Alk Phos 62 unit/L    AST/SGOT 16 unit/L    ALT/SGPT 12 unit/L    Protein Total 7.0 gm/dL    Albumin Level 4.1 gm/dL    Globulin 2.9    A/G Ratio 1.4    Hgb A1c 5.4    Cholesterol 160 mg/dL    Non-    HDL 33 mg/dL  LOW    Chol/HDL Ratio 4.8    LDL 97    Triglyceride 205 mg/dL  HI    TSH 3.83 mIU/L    WBC 7.3    RBC 6.16  HI    Hgb 15.3 gm/dL    Hct 49.6 %    MCV 80.5 fL    MCH 24.8 pg  LOW    MCHC 30.8 gm/dL  LOW    RDW 16.1 %  HI    Platelet 350    MPV 10.7 fL   .

## 2022-02-15 NOTE — NURSING NOTE
Quick Intake Entered On:  11/18/2019 4:34 PM CST    Performed On:  11/18/2019 4:34 PM CST by Floyd Baker MD               Summary   Height Measured :   70 in(Converted to: 5 ft 10 in, 177.80 cm)    Systolic Blood Pressure :   138 mmHg (HI)    Diastolic Blood Pressure :   86 mmHg (HI)    Mean Arterial Pressure :   103 mmHg   Floyd Baker MD - 11/18/2019 4:34 PM CST

## 2022-02-15 NOTE — TELEPHONE ENCOUNTER
Entered by Crissy Kim CMA on November 01, 2019 1:48:02 PM CDT  ---------------------  From: Crissy Kim CMA   To: [facility]    Sent: 11/1/2019 1:48:02 PM CDT  Subject: Medication Management     ** Submitted: **  Order:varenicline (Chantix Continuing Month 1 mg oral tablet)  See Instructions  USE AS DIRECTED BY PHYSICIAN  Qty:  53 EA        Days Supply:  27        Refills:  3          ARLET     Route To [facility]    Signed by Crissy Kim CMA  11/1/2019 1:47:00 PM    ** Submitted: **  Complete:varenicline (Chantix Starter Pack 0.5 mg-1 mg oral tablet)   Signed by Crissy Kim CMA  11/1/2019 1:47:00 PM    ** Not Approved:  **  varenicline (CHANTIX YOMI 1MG)  USE AS DIRECTED BY PHYSICIAN  Qty:  53 EA        Days Supply:  27        Refills:  3          ARLET     Route To [facility]   Note from Pharmacy:  N O T I C E    Last quantity doesn't match original quantity  Signed by Crissy Kim CMA            ** Patient matched by Crissy Kim CMA on 11/1/2019 1:47:34 PM CDT **      ------------------------------------------  From: [facility]  To: Floyd Baker MD  Sent: November 1, 2019 9:12:55 AM CDT  Subject: Medication Management  Due: November 2, 2019 9:12:55 AM CDT    ** On Hold Pending Signature **  Drug: varenicline (Chantix Continuing Month 1 mg oral tablet)  USE AS DIRECTED BY PHYSICIAN  Quantity: 53 EA  Days Supply: 27  Refills: 3  Substitutions Allowed  Notes from Pharmacy: N O T I C E    Last quantity doesn't match original quantity    Dispensed Drug: varenicline (Chantix Continuing Month 1 mg oral tablet)  USE AS DIRECTED BY PHYSICIAN  Quantity: 53 EA  Days Supply: 27  Refills: 3  ARLET  Notes from Pharmacy: N O T I C E    Last quantity doesn't match original quantity  ------------------------------------------

## 2022-02-15 NOTE — NURSING NOTE
Comprehensive Intake Entered On:  6/13/2019 3:59 PM CDT    Performed On:  6/13/2019 3:56 PM CDT by Crissy Kim CMA               Summary   Chief Complaint :   Pt here for med ck   Crissy Kim CMA - 6/13/2019 3:56 PM CDT   Weight Measured :   288.2 lb(Converted to: 288 lb 3 oz, 130.73 kg)    Crissy Kim CMA - 6/13/2019 4:13 PM CDT     Height Measured :   70 in(Converted to: 5 ft 10 in, 177.80 cm)    Crissy Kim CMA - 6/13/2019 3:56 PM CDT   Body Mass Index :   41.35 kg/m2 (HI)      Body Surface Area :   2.54 m2   Crissy Kim CMA - 6/13/2019 4:13 PM CDT     Systolic Blood Pressure :   130 mmHg   Diastolic Blood Pressure :   88 mmHg (HI)    Mean Arterial Pressure :   102 mmHg   Peripheral Pulse Rate :   93 bpm   Oxygen Saturation :   98 %   Crissy Kim CMA - 6/13/2019 3:56 PM CDT   Health Status   Allergies Verified? :   Yes   Medication History Verified? :   Yes   Medical History Verified? :   Yes   Pre-Visit Planning Status :   Completed   Tobacco Use? :   Former smoker   Crissy Kim CMA - 6/13/2019 3:56 PM CDT   Consents   Consent for Immunization Exchange :   Consent Granted   Consent for Immunizations to Providers :   Consent Granted   Crissy Kim CMA - 6/13/2019 3:56 PM CDT   Meds / Allergies   (As Of: 6/13/2019 3:59:02 PM CDT)   Allergies (Active)   doxycycline  Estimated Onset Date:   <not entered> 2013 ; Reactions:   Vomiting ; Created By:   Floyd Baker MD; Reaction Status:   Active ; Category:   Drug ; Substance:   doxycycline ; Type:   Intolerance ; Severity:   Severe ; Updated By:   Floyd Baker MD; Reviewed Date:   6/13/2019 3:57 PM CDT        Medication List   (As Of: 6/13/2019 3:59:02 PM CDT)   Prescription/Discharge Order    amitriptyline  :   amitriptyline ; Status:   Prescribed ; Ordered As Mnemonic:   amitriptyline 25 mg oral tablet ; Simple Display Line:   25 mg, 1 tab(s), PO, hs, 30 tab(s), 0 Refill(s) ; Ordering Provider:   Floyd Baker MD; Catalog  Code:   amitriptyline ; Order Dt/Tm:   10/1/2018 12:03:40 PM          atorvastatin  :   atorvastatin ; Status:   Prescribed ; Ordered As Mnemonic:   atorvastatin 20 mg oral tablet ; Simple Display Line:   20 mg, 1 tab(s), PO, Daily, 90 tab(s), 1 Refill(s) ; Ordering Provider:   Floyd Baker MD; Catalog Code:   atorvastatin ; Order Dt/Tm:   4/9/2019 2:47:15 PM          DULoxetine  :   DULoxetine ; Status:   Prescribed ; Ordered As Mnemonic:   DULoxetine 60 mg oral delayed release capsule ; Simple Display Line:   120 mg, 2 cap(s), po, daily, 180 cap(s), 1 Refill(s) ; Ordering Provider:   Floyd Baker MD; Catalog Code:   DULoxetine ; Order Dt/Tm:   4/9/2019 2:47:53 PM          levothyroxine  :   levothyroxine ; Status:   Prescribed ; Ordered As Mnemonic:   levothyroxine 25 mcg (0.025 mg) oral tablet ; Simple Display Line:   25 mcg, 1 tab(s), Oral, daily, 90 tab(s), 1 Refill(s) ; Ordering Provider:   Floyd Baker MD; Catalog Code:   levothyroxine ; Order Dt/Tm:   4/9/2019 2:48:20 PM          metFORMIN  :   metFORMIN ; Status:   Prescribed ; Ordered As Mnemonic:   metFORMIN 500 mg oral tablet ; Simple Display Line:   1 tab(s), Oral, bidwm, 180 tab(s), 1 Refill(s) ; Ordering Provider:   Floyd Baker MD; Catalog Code:   metFORMIN ; Order Dt/Tm:   4/9/2019 2:48:05 PM          oxyCODONE  :   oxyCODONE ; Status:   Prescribed ; Ordered As Mnemonic:   oxyCODONE 10 mg oral tablet ; Simple Display Line:   1 tab(s), Oral, qid, 120 EA, 0 Refill(s) ; Ordering Provider:   Floyd Baker MD; Catalog Code:   oxyCODONE ; Order Dt/Tm:   5/30/2019 11:05:40 AM          raNITIdine  :   raNITIdine ; Status:   Prescribed ; Ordered As Mnemonic:   raNITIdine 150 mg oral capsule ; Simple Display Line:   150 mg, 1 cap(s), PO, daily, 90 cap(s), 1 Refill(s) ; Ordering Provider:   Floyd Baker MD; Catalog Code:   raNITIdine ; Order Dt/Tm:   4/9/2019 2:48:31 PM          SUMAtriptan  :   SUMAtriptan ; Status:   Prescribed ; Ordered As  Mnemonic:   SUMAtriptan 100 mg oral tablet ; Simple Display Line:   100 mg, 1 tab(s), PO, Once, PRN: for migraine headache, 9 tab(s), 5 Refill(s) ; Ordering Provider:   Floyd Baker MD; Catalog Code:   SUMAtriptan ; Order Dt/Tm:   10/1/2018 12:06:42 PM

## 2022-02-15 NOTE — TELEPHONE ENCOUNTER
---------------------  From: Crissy Kim CMA   To: Floyd Baker MD;     Sent: 2019 4:24:12 PM CDT  Subject: refill request-oxycodone     PCP:   [facility]    medication:   oxycodone  Last Filled:  19    Quantity:  _  Refills:  _      Date of last office visit and reason:   19      Note:  _    Pharmacy: [facility]    Resource:   [facility]  Phone:   [phone]

## 2022-02-15 NOTE — NURSING NOTE
Comprehensive Intake Entered On:  4/23/2020 8:05 AM CDT    Performed On:  4/23/2020 8:03 AM CDT by Alejandra Galvan MA               Summary   Chief Complaint :   Pain management med check; Verbal permission for telephone visit   Height Measured :   70 in(Converted to: 5 ft 10 in, 177.80 cm)    Alejandra Galvan MA - 4/23/2020 8:03 AM CDT   Health Status   Allergies Verified? :   Yes   Medication History Verified? :   Yes   Medical History Verified? :   Yes   Pre-Visit Planning Status :   Completed   Tobacco Use? :   Former smoker   Alejandra Galvan MA - 4/23/2020 8:03 AM CDT   Consents   Consent for Immunization Exchange :   Consent Granted   Consent for Immunizations to Providers :   Consent Granted   Alejandra Galvan MA - 4/23/2020 8:03 AM CDT   Meds / Allergies   (As Of: 4/23/2020 8:05:53 AM CDT)   Allergies (Active)   doxycycline  Estimated Onset Date:   <not entered> 2013 ; Reactions:   Vomiting ; Created By:   Floyd Baker MD; Reaction Status:   Active ; Category:   Drug ; Substance:   doxycycline ; Type:   Intolerance ; Severity:   Severe ; Updated By:   Floyd Baker MD; Reviewed Date:   4/23/2020 8:05 AM CDT        Medication List   (As Of: 4/23/2020 8:05:53 AM CDT)   Prescription/Discharge Order    levothyroxine  :   levothyroxine ; Status:   Prescribed ; Ordered As Mnemonic:   levothyroxine 25 mcg (0.025 mg) oral tablet ; Simple Display Line:   25 mcg, 1 tab(s), Oral, daily, 90 tab(s), 1 Refill(s) ; Ordering Provider:   Floyd Baker MD; Catalog Code:   levothyroxine ; Order Dt/Tm:   4/14/2020 4:27:14 PM CDT          atorvastatin  :   atorvastatin ; Status:   Prescribed ; Ordered As Mnemonic:   atorvastatin 20 mg oral tablet ; Simple Display Line:   20 mg, 1 tab(s), PO, Daily, 90 tab(s), 1 Refill(s) ; Ordering Provider:   Floyd Baker MD; Catalog Code:   atorvastatin ; Order Dt/Tm:   4/14/2020 4:26:58 PM CDT          testosterone  :   testosterone ; Status:   Prescribed ; Ordered As Mnemonic:    Testosterone Cypionate 200 mg/mL intramuscular solution ; Simple Display Line:   See Instructions, Inject intramuscular half (1/2) milliliter(ML) TWICE WEEKLY MONDAY/FRIDAY, 10 mL, 1 Refill(s) ; Ordering Provider:   Floyd Baker MD; Catalog Code:   testosterone ; Order Dt/Tm:   3/18/2020 12:26:31 PM CDT          oxyCODONE  :   oxyCODONE ; Status:   Prescribed ; Ordered As Mnemonic:   oxyCODONE 10 mg oral tablet ; Simple Display Line:   1 tab(s), Oral, qid, 120 EA, 0 Refill(s) ; Ordering Provider:   Floyd Baker MD; Catalog Code:   oxyCODONE ; Order Dt/Tm:   3/17/2020 6:09:21 PM CDT          metFORMIN  :   metFORMIN ; Status:   Prescribed ; Ordered As Mnemonic:   metFORMIN 500 mg oral tablet ; Simple Display Line:   1 tab(s), Oral, bidwm, 180 tab(s), 1 Refill(s) ; Ordering Provider:   Floyd Baker MD; Catalog Code:   metFORMIN ; Order Dt/Tm:   2/24/2020 10:47:48 AM CST          DULoxetine  :   DULoxetine ; Status:   Prescribed ; Ordered As Mnemonic:   DULoxetine 60 mg oral delayed release capsule ; Simple Display Line:   120 mg, 2 cap(s), Oral, daily, 180 cap(s), 1 Refill(s) ; Ordering Provider:   Floyd Baker MD; Catalog Code:   DULoxetine ; Order Dt/Tm:   1/15/2020 3:21:29 PM CST          tadalafil  :   tadalafil ; Status:   Prescribed ; Ordered As Mnemonic:   tadalafil 10 mg oral tablet ; Simple Display Line:   See Instructions, 1 tab(s) Oral daily PRN planned sexual activity, 10 tab(s), 2 Refill(s) ; Ordering Provider:   Floyd Baker MD; Catalog Code:   tadalafil ; Order Dt/Tm:   11/6/2019 1:55:43 PM CST          SUMAtriptan  :   SUMAtriptan ; Status:   Prescribed ; Ordered As Mnemonic:   SUMAtriptan 100 mg oral tablet ; Simple Display Line:   100 mg, 1 tab(s), PO, Once, PRN: for migraine headache, 9 tab(s), 5 Refill(s) ; Ordering Provider:   Floyd Baker MD; Catalog Code:   SUMAtriptan ; Order Dt/Tm:   6/13/2019 4:10:27 PM CDT          amitriptyline  :   amitriptyline ; Status:   Prescribed ; Ordered  As Mnemonic:   amitriptyline 25 mg oral tablet ; Simple Display Line:   25 mg, 1 tab(s), PO, hs, 90 tab(s), 1 Refill(s) ; Ordering Provider:   Floyd Baker MD; Catalog Code:   amitriptyline ; Order Dt/Tm:   6/13/2019 4:10:00 PM CDT

## 2022-02-15 NOTE — LETTER
(Inserted Image. Unable to display)         July 21, 2020      DAVID PEABODY  [address] [place], [place] [id number]        Dear DAVID,    You are due for medication check with Dr Baker.  Video/Telephone visits along with in person visits at the [facility] are available at this time.   Dr Baker will refill your medication at that appointment.      Make sure that you schedule your appointment before you run out of medication to ensure you're not going without any medication.  No further refills will be given until you have an appointment.    You may call  [phone] to schedule your  visit with Dr Baker

## 2022-02-15 NOTE — NURSING NOTE
Comprehensive Intake Entered On:  12/14/2020 8:07 AM CST    Performed On:  12/14/2020 7:59 AM CST by Crissy Kim CMA               Summary   Chief Complaint :   Pt here for pre op DOS 12/17/20 with Dr Betancur for spinal cord stimulator at [facility]   Weight Measured :   288 lb(Converted to: 288 lb 0 oz, 130.635 kg)    Height Measured :   70 in(Converted to: 5 ft 10 in, 177.80 cm)    Body Mass Index :   41.32 kg/m2 (HI)    Body Surface Area :   2.54 m2   Systolic Blood Pressure :   138 mmHg (HI)    Diastolic Blood Pressure :   84 mmHg (HI)    Mean Arterial Pressure :   102 mmHg   Peripheral Pulse Rate :   89 bpm   Oxygen Saturation :   96 %   Crissy Kim CMA - 12/14/2020 7:59 AM CST   Health Status   Allergies Verified? :   Yes   Medication History Verified? :   Yes   Medical History Verified? :   Yes   Pre-Visit Planning Status :   Completed   Tobacco Use? :   Former smoker   Crissy Kim CMA - 12/14/2020 7:59 AM CST   Consents   Consent for Immunization Exchange :   Consent Granted   Consent for Immunizations to Providers :   Consent Granted   Crissy Kim CMA - 12/14/2020 7:59 AM CST   Meds / Allergies   (As Of: 12/14/2020 8:07:02 AM CST)   Allergies (Active)   doxycycline  Estimated Onset Date:   <not entered> 2013 ; Reactions:   Vomiting ; Created By:   Floyd Baker MD; Reaction Status:   Active ; Category:   Drug ; Substance:   doxycycline ; Type:   Intolerance ; Severity:   Severe ; Updated By:   Floyd Baker MD; Reviewed Date:   12/14/2020 8:05 AM CST        Medication List   (As Of: 12/14/2020 8:07:02 AM CST)   Prescription/Discharge Order    amitriptyline  :   amitriptyline ; Status:   Prescribed ; Ordered As Mnemonic:   amitriptyline 25 mg oral tablet ; Simple Display Line:   75 mg, 3 tab(s), PO, hs, 90 tab(s), 5 Refill(s) ; Ordering Provider:   Floyd Baker MD; Catalog Code:   amitriptyline ; Order Dt/Tm:   10/21/2020 10:09:25 AM CDT          atorvastatin  :    atorvastatin ; Status:   Prescribed ; Ordered As Mnemonic:   atorvastatin 20 mg oral tablet ; Simple Display Line:   20 mg, 1 tab(s), PO, Daily, 90 tab(s), 1 Refill(s) ; Ordering Provider:   Floyd aBker MD; Catalog Code:   atorvastatin ; Order Dt/Tm:   10/21/2020 10:09:50 AM CDT          DULoxetine  :   DULoxetine ; Status:   Prescribed ; Ordered As Mnemonic:   DULoxetine 60 mg oral delayed release capsule ; Simple Display Line:   120 mg, 2 cap(s), Oral, daily, 180 cap(s), 1 Refill(s) ; Ordering Provider:   Floyd Baker MD; Catalog Code:   DULoxetine ; Order Dt/Tm:   10/21/2020 10:08:56 AM CDT          levothyroxine  :   levothyroxine ; Status:   Prescribed ; Ordered As Mnemonic:   levothyroxine 25 mcg (0.025 mg) oral tablet ; Simple Display Line:   25 mcg, 1 tab(s), Oral, daily, 90 tab(s), 1 Refill(s) ; Ordering Provider:   Floyd Baker MD; Catalog Code:   levothyroxine ; Order Dt/Tm:   8/12/2020 9:33:04 AM CDT          metFORMIN  :   metFORMIN ; Status:   Prescribed ; Ordered As Mnemonic:   metFORMIN 500 mg oral tablet ; Simple Display Line:   1 tab(s), Oral, bid, WM., 180 tab(s), 1 Refill(s) ; Ordering Provider:   Floyd Baker MD; Catalog Code:   metFORMIN ; Order Dt/Tm:   9/14/2020 9:31:15 AM CDT          oxyCODONE  :   oxyCODONE ; Status:   Prescribed ; Ordered As Mnemonic:   oxyCODONE 10 mg oral tablet ; Simple Display Line:   1 tab(s), Oral, qid, 120 EA, 0 Refill(s) ; Ordering Provider:   Floyd Baker MD; Catalog Code:   oxyCODONE ; Order Dt/Tm:   12/7/2020 8:44:20 AM CST          SUMAtriptan  :   SUMAtriptan ; Status:   Prescribed ; Ordered As Mnemonic:   SUMAtriptan 100 mg oral tablet ; Simple Display Line:   See Instructions, TAKE ONE (1) TABLET BY MOUTH ONCE AS NEEDED FOR MIGRAINE HEADACHE, 9 tab(s), 4 Refill(s) ; Ordering Provider:   Floyd Baker MD; Catalog Code:   SUMAtriptan ; Order Dt/Tm:   11/9/2020 10:54:53 AM CST          tadalafil  :   tadalafil ; Status:   Prescribed ; Ordered As  Mnemonic:   tadalafil 10 mg oral tablet ; Simple Display Line:   See Instructions, 1 tab(s) Oral daily PRN planned sexual activity, 10 tab(s), 2 Refill(s) ; Ordering Provider:   Floyd Baker MD; Catalog Code:   tadalafil ; Order Dt/Tm:   11/6/2019 1:55:43 PM CST          testosterone  :   testosterone ; Status:   Prescribed ; Ordered As Mnemonic:   Testosterone Cypionate 200 mg/mL intramuscular solution ; Simple Display Line:   See Instructions, Inject intramuscular 0.5 milliliter  ONCE weekly, 10 mL, 1 Refill(s) ; Ordering Provider:   Floyd Baker MD; Catalog Code:   testosterone ; Order Dt/Tm:   10/21/2020 10:02:11 AM CDT          tiZANidine  :   tiZANidine ; Status:   Processing ; Ordered As Mnemonic:   tiZANidine 4 mg oral tablet ; Simple Display Line:   8 mg, 2 tab(s), Oral, tid, 60 tab(s), 1 Refill(s) ; Ordering Provider:   Floyd Baker MD; Action Display:   Modify ; Catalog Code:   tiZANidine ; Order Dt/Tm:   12/14/2020 8:04:20 AM CST            ID Risk Screen   Recent Travel History :   No recent travel   Family Member Travel History :   No recent travel   Other Exposure to Infectious Disease :   Unknown   Crissy Kim CMA - 12/14/2020 7:59 AM CST   Social History   Social History   (As Of: 12/14/2020 8:07:02 AM CST)   Alcohol:  Denies Alcohol Use      Never   (Last Updated: 7/11/2012 8:55:59 AM CDT by Miracle Griffiths)          Tobacco:  Past      Former smoker, quit more than 30 days ago   (Last Updated: 12/14/2020 8:00:30 AM CST by Crissy Kim CMA)          Electronic Cigarette/Vaping:        Electronic Cigarette Use: Never.   (Last Updated: 12/14/2020 8:00:30 AM CST by Crissy Kim CMA)          Substance Abuse:        Never   (Last Updated: 4/29/2014 3:22:36 PM CDT by Floyd Baker MD)          Employment/School:        Employed   Comments:  4/29/2014 3:23 PM - Floyd Baker MD: [employer]   (Last Updated: 4/29/2014 3:23:08 PM CDT by Samuel AMEZCUA, Martir           Home/Environment:        Marital status:  (Living together).  Lives with Self, Children, Spouse.  Living situation: Home/Independent.   (Last Updated: 4/29/2014 3:23:22 PM CDT by Floyd Baker MD)          Exercise:  Does not exercise      Exercise frequency: No regular exercise..   (Last Updated: 7/11/2012 8:58:00 AM CDT by Miracle Griffiths)

## 2022-02-15 NOTE — LETTER
(Inserted Image. Unable to display)   January 13, 2021        TIMOTHY PEABODY  [address] [place], [place] [id number]        Dear DAVID,      Thank you for selecting [facility] for your healthcare needs.    Our records indicate you are due for the following services:    Diabetic Exam ~ Please bring your glucose meter and/or your blood glucose diary to your appointment.  Hypertension check ~ please remember to bring your at-home blood pressure readings with you to your appointment.   Fasting Lab Tests ~ Please do not eat or drink anything 10 hours prior to your scheduled appointment time.  (Water and any medications that you may need are allowed unless directed otherwise.)    If you had your labs done at another facility or with Direct Access Lab Testing at [facility], please bring in a copy of the results to your next visit, mail a copy, or drop off a copy of your results to your Healthcare Provider.    You are due for lab work and an office visit; please schedule the lab appointment 1 week before the office visit.  This will assure all results are available to discuss with your Healthcare Provider during your visit.    (FYI   Regarding office visits: In some instances, a video visit or telephone visit may be offered as an option.)      **It is very helpful if you bring your medication bottles to your appointment.  This assures we have all of your current medications, including strength and dosing information, documented accurately in your medical record.    To schedule an appointment or if you have further questions, please contact your clinic at [phone].         Powered by [employer]    Sincerely,    Floyd Baker M.D.

## 2022-02-15 NOTE — TELEPHONE ENCOUNTER
Entered by Crissy Kim CMA on September 14, 2020 9:31:33 AM CDT  ---------------------  From: Crissy Kim CMA   To: [facility]    Sent: 9/14/2020 9:31:33 AM CDT  Subject: Medication Management     ** Submitted: **  Order:metFORMIN (metFORMIN 500 mg oral tablet)  1 tab(s)  Oral  bid  WM.  Qty:  180 tab(s)        Days Supply:  90        Refills:  1          Substitutions Allowed     Route To [facility]    Signed by Crissy Kim CMA  9/14/2020 2:31:00 PM [facility]    ** Submitted: **  Complete:metFORMIN (metFORMIN 500 mg oral tablet)   Signed by Crissy Kim CMA  9/14/2020 2:31:00 PM [facility]    ** Not Approved:  **  metFORMIN (METFORMIN 500MG)  ONE (1) TAB(S) ORAL TWICE DAILY WM  Qty:  180 tab(s)        Days Supply:  90        Refills:  0          Substitutions Allowed     Route To [facility]   Signed by Crissy Kim CMA            ------------------------------------------  From: [facility]  To: Floyd Baker MD  Sent: September 11, 2020 9:02:15 AM CDT  Subject: Medication Management  Due: September 9, 2020 4:20:39 PM CDT     ** On Hold Pending Signature **     Drug: metFORMIN (metFORMIN 500 mg oral tablet), ONE (1) TAB(S) ORAL TWICE DAILY WM  Quantity: 180 tab(s)  Days Supply: 90  Refills: 0  Substitutions Allowed  Notes from Pharmacy:     Dispensed Drug: metFORMIN (metFORMIN 500 mg oral tablet), ONE (1) TAB(S) ORAL TWICE DAILY WM  Quantity: 180 tab(s)  Days Supply: 90  Refills: 0  Substitutions Allowed  Notes from Pharmacy:  ------------------------------------------

## 2022-02-15 NOTE — NURSING NOTE
Comprehensive Intake Entered On:  8/12/2020 9:26 AM CDT    Performed On:  8/12/2020 9:26 AM CDT by Kim REA Crissy               Summary   Chief Complaint :   consent for telephone visit for med refils   Crissy Kim CMA - 8/12/2020 9:37 AM CDT   Height Measured :   70 in(Converted to: 5 ft 10 in, 177.80 cm)    Crissy Kim CMA - 8/12/2020 9:26 AM CDT   Health Status   Allergies Verified? :   Yes   Medication History Verified? :   Yes   Medical History Verified? :   Yes   Pre-Visit Planning Status :   Completed   Tobacco Use? :   Former smoker   Crissy Kim CMA - 8/12/2020 9:26 AM CDT   Consents   Consent for Immunization Exchange :   Consent Granted   Consent for Immunizations to Providers :   Consent Granted   Adriana Kim CMAra - 8/12/2020 9:26 AM CDT   Meds / Allergies   (As Of: 8/12/2020 9:26:49 AM CDT)   Allergies (Active)   doxycycline  Estimated Onset Date:   <not entered> 2013 ; Reactions:   Vomiting ; Created By:   Floyd Baker MD; Reaction Status:   Active ; Category:   Drug ; Substance:   doxycycline ; Type:   Intolerance ; Severity:   Severe ; Updated By:   Floyd Baker MD; Reviewed Date:   4/23/2020 8:05 AM CDT        Medication List   (As Of: 8/12/2020 9:26:49 AM CDT)   Prescription/Discharge Order    amitriptyline  :   amitriptyline ; Status:   Prescribed ; Ordered As Mnemonic:   amitriptyline 25 mg oral tablet ; Simple Display Line:   25 mg, 1 tab(s), PO, hs, 90 tab(s), 1 Refill(s) ; Ordering Provider:   Floyd Baker MD; Catalog Code:   amitriptyline ; Order Dt/Tm:   6/13/2019 4:10:00 PM CDT          atorvastatin  :   atorvastatin ; Status:   Prescribed ; Ordered As Mnemonic:   atorvastatin 20 mg oral tablet ; Simple Display Line:   20 mg, 1 tab(s), PO, Daily, 90 tab(s), 1 Refill(s) ; Ordering Provider:   Floyd Baker MD; Catalog Code:   atorvastatin ; Order Dt/Tm:   7/15/2020 12:34:16 PM CDT          DULoxetine  :   DULoxetine ; Status:   Prescribed ; Ordered As  Mnemonic:   DULoxetine 60 mg oral delayed release capsule ; Simple Display Line:   120 mg, 2 cap(s), Oral, daily, 180 cap(s), 0 Refill(s) ; Ordering Provider:   Floyd Baker MD; Catalog Code:   DULoxetine ; Order Dt/Tm:   8/10/2020 10:23:52 AM CDT          levothyroxine  :   levothyroxine ; Status:   Prescribed ; Ordered As Mnemonic:   levothyroxine 25 mcg (0.025 mg) oral tablet ; Simple Display Line:   25 mcg, 1 tab(s), Oral, daily, 90 tab(s), 1 Refill(s) ; Ordering Provider:   Floyd Baker MD; Catalog Code:   levothyroxine ; Order Dt/Tm:   4/14/2020 4:27:14 PM CDT          metFORMIN  :   metFORMIN ; Status:   Prescribed ; Ordered As Mnemonic:   metFORMIN 500 mg oral tablet ; Simple Display Line:   1 tab(s), Oral, bidwm, 180 tab(s), 1 Refill(s) ; Ordering Provider:   Floyd Baker MD; Catalog Code:   metFORMIN ; Order Dt/Tm:   2/24/2020 10:47:48 AM CST          oxyCODONE  :   oxyCODONE ; Status:   Prescribed ; Ordered As Mnemonic:   oxyCODONE 10 mg oral tablet ; Simple Display Line:   1 tab(s), Oral, qid, 120 EA, 0 Refill(s) ; Ordering Provider:   Floyd Baker MD; Catalog Code:   oxyCODONE ; Order Dt/Tm:   7/15/2020 12:39:15 PM CDT          SUMAtriptan  :   SUMAtriptan ; Status:   Prescribed ; Ordered As Mnemonic:   SUMAtriptan 100 mg oral tablet ; Simple Display Line:   100 mg, 1 tab(s), PO, Once, PRN: for migraine headache, 9 tab(s), 5 Refill(s) ; Ordering Provider:   Floyd Baker MD; Catalog Code:   SUMAtriptan ; Order Dt/Tm:   6/13/2019 4:10:27 PM CDT          tadalafil  :   tadalafil ; Status:   Prescribed ; Ordered As Mnemonic:   tadalafil 10 mg oral tablet ; Simple Display Line:   See Instructions, 1 tab(s) Oral daily PRN planned sexual activity, 10 tab(s), 2 Refill(s) ; Ordering Provider:   Floyd Baker MD; Catalog Code:   tadalafil ; Order Dt/Tm:   11/6/2019 1:55:43 PM CST          testosterone  :   testosterone ; Status:   Prescribed ; Ordered As Mnemonic:   Testosterone Cypionate 200 mg/mL  intramuscular solution ; Simple Display Line:   See Instructions, Inject intramuscular half (1/2) milliliter(ML) TWICE WEEKLY MONDAY/FRIDAY, 10 mL, 1 Refill(s) ; Ordering Provider:   Floyd Baker MD; Catalog Code:   testosterone ; Order Dt/Tm:   3/18/2020 12:26:31 PM CDT            ID Risk Screen   Recent Travel History :   No recent travel   Family Member Travel History :   No recent travel   Other Exposure to Infectious Disease :   Unknown   Crissy Kim CMA - 8/12/2020 9:26 AM CDT

## 2022-02-15 NOTE — TELEPHONE ENCOUNTER
---------------------  From: Crissy Kim CMA (eRx Pool (32224_[facility]))   To: Floyd Baker MD;     Sent: 2/1/2021 9:34:16 AM CST  Subject: FW: Medication Management   Due Date/Time: 2/2/2021 9:07:00 AM CST             ** Patient matched by Crissy Kim CMA on 2/1/2021 9:34:05 AM CST **      ------------------------------------------  From: [facility]  To: Floyd Baker MD  Sent: February 1, 2021 9:07:45 AM CST  Subject: Medication Management  Due: January 30, 2021 4:21:54 PM CST     ** On Hold Pending Signature **     Drug: oxyCODONE (oxyCODONE 10 mg oral tablet), ONE (1) TAB(S) ORAL FOUR TIMES DAILY  Quantity: 120 tab(s)  Days Supply: 30  Refills: 0  Substitutions Allowed  Notes from Pharmacy:     Dispensed Drug: oxyCODONE (oxyCODONE 10 mg oral tablet), ONE (1) TAB(S) ORAL FOUR TIMES DAILY  Quantity: 120 tab(s)  Days Supply: 30  Refills: 0  Substitutions Allowed  Notes from Pharmacy:  ------------------------------------------

## 2022-02-15 NOTE — TELEPHONE ENCOUNTER
---------------------  From: Crissy Kim CMA   To: Floyd Baker MD;     Sent: 7/15/2020 12:35:42 PM CDT  Subject: oxycodone refill     PCP:   LYDIA    Medication:   oxycodone  Last Filled:  20.... knows not due until Friday but LYDIA is OC   Quantity:  _  Refills:  _      Date of last office visit and reason:   20      Note:  _    Pharmacy:     Resource:     Phone:   [phone]  ** Submitted: **  Order:oxyCODONE (oxyCODONE 10 mg oral tablet)  1 tab(s)  Oral  qid  Qty:  120 EA        Refills:  0          Route To Pharmacy - [facility]    Signed by Floyd Baker MD  7/15/2020 5:39:00 PM [facility]

## 2022-02-15 NOTE — TELEPHONE ENCOUNTER
---------------------  From: Crissy Kim CMA   To: Floyd Baker MD;     Sent: 2019 7:28:55 AM CDT  Subject: refill request-oxycodone     PCP:   LYDIA    Medication:   oxycodone  Last Filled:  3/4/19    Quantity:  120  Refills:  0      Date of last office visit and reason:   18      Note:   requesting refill      Pharmacy: [facility]    Resource:   [place]  Phone:   [phone]

## 2022-02-15 NOTE — TELEPHONE ENCOUNTER
Entered by Aggie Sheppard CMA on June 16, 2021 9:58:47 AM CDT  ---------------------  From: Aggie Sheppard CMA   To: [facility]    Sent: 6/16/2021 9:58:47 AM CDT  Subject: Medication Management     ** Not Approved: Patient no longer under Prescriber care **  amphetamine-dextroamphetamine (AMPHET/DEXTR 10MG)  TAKE ONE (1) TABLET BY MOUTH THREE (3) TIMES DAILY  Qty:  90 tab(s)        Days Supply:  30        Refills:  0          Substitutions Allowed     Route To Pharmacy - [facility]   Signed by Aggie Sheppard CMA            ** Patient matched by Aggie Sheppard CMA on 6/16/2021 9:56:00 AM CDT **      ------------------------------------------  From: [facility]  To: Floyd Baker MD  Sent: Ai 15, 2021 1:16:57 PM CDT  Subject: Medication Management  Due: June 4, 2021 8:25:53 PM CDT     ** On Hold Pending Signature **     Drug: amphetamine-dextroamphetamine (amphetamine-dextroamphetamine 10 mg oral tablet), TAKE ONE (1) TABLET BY MOUTH THREE (3) TIMES DAILY  Quantity: 90 tab(s)  Days Supply: 30  Refills: 0  Substitutions Allowed  Notes from Pharmacy:     Dispensed Drug: amphetamine-dextroamphetamine (amphetamine-dextroamphetamine 10 mg oral tablet), TAKE ONE (1) TABLET BY MOUTH THREE (3) TIMES DAILY  Quantity: 90 tab(s)  Days Supply: 30  Refills: 0  Substitutions Allowed  Notes from Pharmacy:  ---------------------------------------------------------------  From: Aggie Sheppard CMA (eRx Pool (32224_[facility]))   To: Appointment Pool (32224_[place]);     Sent: 6/16/2021 9:59:18 AM CDT  Subject: FW: Medication Management   Due Date/Time: 6/16/2021 1:16:00 PM CDT     Please remove PCP - MARLO to transfer care to [facility] 4/13/21      ---------------------  From: Aggie Sheppard CMA   To: [facility]    Sent: 6/16/2021 9:58:47 AM CDT  Subject: Medication Management     ** Not Approved: Patient no longer under Prescriber care **  amphetamine-dextroamphetamine (AMPHET/DEXTR 10MG)  TAKE ONE (1)  TABLET BY MOUTH THREE (3) TIMES DAILY  Qty:  90 tab(s)        Days Supply:  30        Refills:  0          Substitutions Allowed     Route To Pharmacy - [facility]   Signed by Aggie Sheppard CMA---------------------  From: Loul[facility] Gal[facility] (Appointment Pool (32224_[place]))   To: eRx Pool (32224_[place] [facility]);     Sent: 6/16/2021 10:23:38 AM CDT  Subject: RE: Medication Management     PCP has been changed to NONE

## 2022-02-15 NOTE — LETTER
(Inserted Image. Unable to display)     Ai 10, 2019      DAVID PEABODY  [address] [place], [place] [id number]          Dear DAVID,      Thank you for selecting [facility] (previously [place] [facility]) for your healthcare needs.      Our records indicate you are due for the following services:     Follow-up office visit / Medication Check.      To schedule an appointment or if you have further questions, please contact your primary clinic:   [facility]       [phone]   [facility]       [phone]              [facility]     [phone]      Powered by [employer]    Sincerely,    Floyd Baker MD

## 2022-02-15 NOTE — PROGRESS NOTES
Patient:   PEABODY, TIMOTHY J            MRN: [id number]            FIN: [id number]               Age:   44 years     Sex:  Male     :  1975   Associated Diagnoses:   Facial paresthesia   Author:   Floyd Baker MD      Impression and Plan   Diagnosis     Facial paresthesia (OHT77-QG R20.9).     Rule out Vascular event.     Plan:  Recommended immediate ER evaluation - wife will drive him to [facility].    Orders     Orders   Charges (Evaluation and Management):  33300 office outpatient visit 15 minutes (Charge) (Order): Quantity: 1, Facial paresthesia.        Visit Information      Date of Service: 2019 04:15 pm  Performing Location: [facility]  Encounter#: [id number]      Primary Care Provider (PCP):  Floyd Baker MD    NPI# [id number]      Referring Provider:  Floyd Baker MD# [id number]   Visit type:  New symptom.    Accompanied by:  No one.    Source of history:  Self.    History limitation:  None.       Chief Complaint   2019 4:16 PM CST   Pt here for HA and left sided face numbness        History of Present Illness             The patient presents with paresthesia.  The location of the paresthesia is the left, face.  The paresthesia is described as numbness and tingling.  The severity of the paresthesia is moderate.  The paresthesia fluctuates in intensity.  The paresthesia has lasted for 1 day(s).  There are no modifying factors.  Associated symptoms consist of headache last night now resolved - did not feel like his typical migraine.        Review of Systems   Constitutional:  Negative.    Eye:  Negative.    Ear/Nose/Mouth/Throat:  Negative.    Respiratory:  Negative.    Cardiovascular:  Negative.    Gastrointestinal:  Negative.    Genitourinary:  Negative.    Hematology/Lymphatics:  Negative.    Endocrine:  Negative.    Immunologic:  Negative.    Musculoskeletal:  Negative.    Integumentary:  Negative.    Neurologic:  Numbness, Tingling, Headache.     Psychiatric:  Negative.    All other systems reviewed and negative      Health Status   Allergies:    Nonallergic Reactions (Selected)  Severe  Doxycycline (Vomiting)   Medications:  (Selected)   Prescriptions  Prescribed  Chantix Continuing Month 1 mg oral tablet: See Instructions, Instructions: USE AS DIRECTED BY PHYSICIAN, # 53 EA, 3 Refill(s), ARLET, Type: Maintenance, Pharmacy: [facility]  DULoxetine 60 mg oral delayed release capsule: = 2 cap(s) ( 120 mg ), po, daily, # 180 cap(s), 1 Refill(s), Type: Maintenance, Pharmacy: [facility], 2 cap(s) Oral daily  SUMAtriptan 100 mg oral tablet: = 1 tab(s) ( 100 mg ), PO, Once, PRN: for migraine headache, # 9 tab(s), 5 Refill(s), Type: Soft Stop, Pharmacy: [facility], 1 tab(s) Oral once,PRN:for migraine headache  Testosterone Cypionate 200 mg/mL intramuscular solution: See Instructions, Instructions: Inject intramuscular half (1/2) milliliter(ML) TWICE WEEKLY MONDAY/FRIDAY, # 10 mL, 1 Refill(s), Type: Soft Stop, Pharmacy: [facility], Inject intramuscular half (1/2) milliliter(ML) TWICE WEEKLY MONDAY/FRIDAY  amitriptyline 25 mg oral tablet: = 1 tab(s) ( 25 mg ), PO, hs, # 90 tab(s), 1 Refill(s), Type: Maintenance, Pharmacy: [facility], 1 tab(s) Oral hs  atorvastatin 20 mg oral tablet: = 1 tab(s) ( 20 mg ), PO, Daily, # 90 tab(s), 1 Refill(s), Type: Maintenance, Pharmacy: [facility], 1 tab(s) Oral daily  levothyroxine 25 mcg (0.025 mg) oral tablet: = 1 tab(s) ( 25 mcg ), Oral, daily, # 90 tab(s), 1 Refill(s), ARLET, Type: Maintenance, Pharmacy: [facility], 1 tab(s) Oral daily  metFORMIN 500 mg oral tablet: = 1 tab(s), Oral, bidwm, # 180 tab(s), 1 Refill(s), ARLET, Type: Maintenance, Pharmacy: [facility], 1 tab(s) Oral bidwm  oxyCODONE 10 mg oral tablet: = 1 tab(s) ( 10 mg ), po, q6 hrs, # 120 tab(s), 0 Refill(s), Type: Acute, Pharmacy: [facility], 1 tab(s) Oral q6 hrs  oxyCODONE 10 mg oral tablet: = 1  tab(s), Oral, qid, # 120 EA, 0 Refill(s), Type: Soft Stop, Pharmacy: [facility], 1 tab(s) Oral qid  raNITIdine 150 mg oral capsule: = 1 cap(s) ( 150 mg ), PO, daily, # 90 cap(s), 1 Refill(s), Type: Maintenance, Pharmacy: [facility], 1 cap(s) Oral daily  tadalafil 10 mg oral tablet: See Instructions, Instructions: 1 tab(s) Oral daily PRN planned sexual activity, # 10 tab(s), 2 Refill(s), Type: Maintenance, Pharmacy: [facility], 1 tab(s) Oral daily PRN planned sexual activity   Problem list:    All Problems (Selected)  Benign essential HTN / SNOMED CT 0589450 / Confirmed  Benign familial tremor / SNOMED CT 9847316520 / Confirmed  Cartilage tear / ICD-9-.9 / Confirmed  Chronic Lumbar Pain / ICD-9-.2 / Confirmed  Chronic pain / SNOMED CT 951724077 / Confirmed  Hypothyroidism / SNOMED CT 47240606 / Confirmed  Insomnia / ICD-9-.52 / Confirmed  Low testosterone in male / SNOMED CT 405892566 / Confirmed  Migraine Headache / ICD-9-.90 / Confirmed  Mild Episode of Depression / ICD-9-.31 / Confirmed  Moderate major depression / SNOMED CT 6851597 / Confirmed  Morbid obesity / SNOMED CT 513589534 / Confirmed  Obesity / ICD-9-.00 / Probable  Onychomycosis / SNOMED CT 2706141960 / Confirmed  PUD (Peptic Ulcer Disease) / ICD-9-.90 / Confirmed  Type 2 diabetes mellitus / SNOMED CT 193356239 / Confirmed      Histories   Past Medical History:    Active  PUD (Peptic Ulcer Disease) (533.90)  Chronic Lumbar Pain (724.2)  Onychomycosis (6137088836)  Mild Episode of Depression (296.31)  Obesity (278.00)  Cartilage tear (848.9)  Migraine Headache (346.90)  Insomnia (780.52)  Resolved  *[facility]ospitalized@[facility] - Pneumonia: Onset on 2/14/2015 at 39 years.  Resolved on 2/19/2015 at 39 years.  Tobacco user (305.1):  Resolved.   Family History:       Procedure history:    Spinal fusion (SNOMED CT 99513596) performed by Freddy Duvall MD on 8/22/2017 at 41 Years.  Comments:  8/31/2017  8:02 AM CDT - Ashley Amaro  L4-5.  Fusion L5-S1 in 2015 at 40 Years.  Comments:  2/3/2015 8:49 AM Floyd Raines MD  02/05/2015  [facility]  Dr. Rios  Bilateral L5-S1 Decompression Foramina in 2014 at 39 Years.  Comments:  4/29/2014 3:20 PM Floyd Mayo MD, Dr.  [facility]  05/06/2014    Vasectomy (SNOMED CT 48506043) on 7/26/2012 at 36 Years.  Arthroscopy (SNOMED CT 73849351) in 2011 at 36 Years.  Comments:  7/11/2012 8:59 AM Miracle White  Bilateral knee  Polysomnogram (SNOMED CT 550529354) on 8/14/2009 at 33 Years.  Comments:  7/11/2012 9:09 AM Miracle White  No significant sleep-disorder findings.  Bruxism.  Follow-up indicated.  Tonsillectomy (SNOMED CT 833011902) in 1980 at 5 Years.   Social History:        Alcohol Assessment: Denies Alcohol Use            Never      Tobacco Assessment: Past            Past, Cigarettes, 4 per day.                     Comments:                      04/29/2014 - Floyd Baker MD                     Quit 04/01/2014      Substance Abuse Assessment            Never      Employment and Education Assessment            Employed                     Comments:                      04/29/2014 - Floyd Baker MD                     [employer]      Home and Environment Assessment            Marital status:  (Living together).  Lives with Self, Children, Spouse.  Living situation:               Home/Independent.      Exercise and Physical Activity Assessment: Does not exercise            Exercise frequency: No regular exercise..        Physical Examination   Vital Signs   11/18/2019 4:34 PM CST Systolic Blood Pressure 138 mmHg  HI    Diastolic Blood Pressure 86 mmHg  HI    Mean Arterial Pressure 103 mmHg   11/18/2019 4:16 PM CST Peripheral Pulse Rate 125 bpm  HI    Systolic Blood Pressure 156 mmHg  HI    Diastolic Blood Pressure 94 mmHg  HI    Mean Arterial Pressure 115 mmHg    Oxygen Saturation 90 %  LOW       Measurements from flowsheet : Measurements   11/18/2019 4:34 PM CST Height Measured - Standard 70 in   11/18/2019 4:16 PM CST Height Measured - Standard 70 in    Weight Measured - Standard 288 lb    BSA 2.54 m2    Body Mass Index 41.32 kg/m2  HI      General:  No acute distress.    Eye:  Pupils are equal, round and reactive to light, Extraocular movements are intact, Normal conjunctiva, Vision unchanged.    HENT:  Normocephalic, Tympanic membranes are clear, Normal hearing, Oral mucosa is moist, No pharyngeal erythema.    Neck:  Supple, No carotid bruit, No jugular venous distention, No lymphadenopathy, No thyromegaly.    Respiratory:  Lungs are clear to auscultation, Respirations are non-labored, Breath sounds are equal, Symmetrical chest wall expansion.    Cardiovascular:  Normal rate, Regular rhythm, No murmur, No gallop, Good pulses equal in all extremities, Normal peripheral perfusion, No edema.    Musculoskeletal:  limping gait due to chronic lumbar problems.    Integumentary:  Warm, Dry, Pink.    Neurologic:  Alert, Oriented, Normal motor function, Normal deep tendon reflexes, Essential tremor unchanged.    Psychiatric:  Cooperative, Appropriate mood & affect, Normal judgment.

## 2022-02-15 NOTE — NURSING NOTE
Comprehensive Intake Entered On:  4/9/2019 2:28 PM CDT    Performed On:  4/9/2019 2:27 PM CDT by Crissy Kim CMA               Summary   Chief Complaint :   Pt here for med ck   Weight Measured :   299.4 lb(Converted to: 299 lb 6 oz, 135.81 kg)    Height Measured :   70 in(Converted to: 5 ft 10 in, 177.80 cm)    Body Mass Index :   42.95 kg/m2 (HI)    Body Surface Area :   2.59 m2   Peripheral Pulse Rate :   86 bpm   Oxygen Saturation :   97 %   Crissy Kim CMA - 4/9/2019 2:27 PM CDT   Health Status   Allergies Verified? :   Yes   Medication History Verified? :   Yes   Medical History Verified? :   Yes   Pre-Visit Planning Status :   Completed   Tobacco Use? :   Former smoker   Crissy Kim CMA - 4/9/2019 2:27 PM CDT   Consents   Consent for Immunization Exchange :   Consent Granted   Consent for Immunizations to Providers :   Consent Granted   Crissy Kim CMA - 4/9/2019 2:27 PM CDT   Meds / Allergies   (As Of: 4/9/2019 2:28:55 PM CDT)   Allergies (Active)   doxycycline  Estimated Onset Date:   <not entered> 2013 ; Reactions:   Vomiting ; Created By:   Floyd Baker MD; Reaction Status:   Active ; Category:   Drug ; Substance:   doxycycline ; Type:   Intolerance ; Severity:   Severe ; Updated By:   Floyd Baker MD; Reviewed Date:   4/9/2019 2:28 PM CDT        Medication List   (As Of: 4/9/2019 2:28:55 PM CDT)   Prescription/Discharge Order    amitriptyline  :   amitriptyline ; Status:   Prescribed ; Ordered As Mnemonic:   amitriptyline 25 mg oral tablet ; Simple Display Line:   25 mg, 1 tab(s), PO, hs, 30 tab(s), 0 Refill(s) ; Ordering Provider:   Floyd Baker MD; Catalog Code:   amitriptyline ; Order Dt/Tm:   10/1/2018 12:03:40 PM          DULoxetine  :   DULoxetine ; Status:   Prescribed ; Ordered As Mnemonic:   DULoxetine 60 mg oral delayed release capsule ; Simple Display Line:   120 mg, 2 cap(s), po, daily, 180 cap(s), 0 Refill(s) ; Ordering Provider:   Floyd Baker MD; Catalog Code:    DULoxetine ; Order Dt/Tm:   3/14/2019 1:15:41 PM          gabapentin  :   gabapentin ; Status:   Prescribed ; Ordered As Mnemonic:   gabapentin 300 mg oral capsule ; Simple Display Line:   600 mg, 2 cap(s), PO, TID, 180 cap(s), 1 Refill(s) ; Ordering Provider:   Floyd Baker MD; Catalog Code:   gabapentin ; Order Dt/Tm:   10/1/2018 12:04:53 PM          levothyroxine  :   levothyroxine ; Status:   Prescribed ; Ordered As Mnemonic:   levothyroxine 25 mcg (0.025 mg) oral tablet ; Simple Display Line:   25 mcg, 1 tab(s), Oral, daily, 90 tab(s), 0 Refill(s) ; Ordering Provider:   Floyd Baker MD; Catalog Code:   levothyroxine ; Order Dt/Tm:   1/31/2019 4:35:11 PM          metFORMIN  :   metFORMIN ; Status:   Prescribed ; Ordered As Mnemonic:   metFORMIN 500 mg oral tablet ; Simple Display Line:   1 tab(s), Oral, bidwm, 180 tab(s), 0 Refill(s) ; Ordering Provider:   Floyd Baker MD; Catalog Code:   metFORMIN ; Order Dt/Tm:   1/31/2019 4:35:33 PM          oxyCODONE  :   oxyCODONE ; Status:   Prescribed ; Ordered As Mnemonic:   oxyCODONE 10 mg oral tablet ; Simple Display Line:   10 mg, 1 tab(s), po, qid, 120 tab(s), 0 Refill(s) ; Ordering Provider:   Floyd Baker MD; Catalog Code:   oxyCODONE ; Order Dt/Tm:   4/4/2019 10:17:49 AM          oxyCODONE 10 mg oral tablet  :   oxyCODONE 10 mg oral tablet ; Status:   Prescribed ; Ordered As Mnemonic:   oxyCODONE 10 mg oral tablet ; Simple Display Line:   See Instructions, Take one (1) tablet four times daily as needed for pain, 120 EA ; Ordering Provider:   Floyd Baker MD; Catalog Code:   oxyCODONE ; Order Dt/Tm:   1/3/2019 4:35:59 PM          raNITIdine  :   raNITIdine ; Status:   Prescribed ; Ordered As Mnemonic:   raNITIdine 150 mg oral capsule ; Simple Display Line:   150 mg, 1 cap(s), PO, daily, 30 cap(s), 0 Refill(s) ; Ordering Provider:   Floyd Baker MD; Catalog Code:   raNITIdine ; Order Dt/Tm:   10/1/2018 12:06:49 PM          SUMAtriptan  :   SUMAtriptan ;  Status:   Prescribed ; Ordered As Mnemonic:   SUMAtriptan 100 mg oral tablet ; Simple Display Line:   100 mg, 1 tab(s), PO, Once, PRN: for migraine headache, 9 tab(s), 5 Refill(s) ; Ordering Provider:   Floyd Baker MD; Catalog Code:   SUMAtriptan ; Order Dt/Tm:   10/1/2018 12:06:42 PM

## 2022-02-15 NOTE — NURSING NOTE
Pre op exam and EKG faxed     TO:  [facility]    FAX: [phone]    DOS: 8/22/17    DR: Blanca    PROC: Spinal Fusion

## 2022-02-15 NOTE — PROGRESS NOTES
Patient:   PEABODY, TIMOTHY J            MRN: [id number]            FIN: [id number]               Age:   41 years     Sex:  Male     :  1975   Associated Diagnoses:   Chronic pain; Chronic Lumbar Pain   Author:   Floyd aBker MD      Impression and Plan   Treatment goals:  Decrease pain intensity, Improve psychological state, Improve functional mobility, Maintain functional ADL/IADL, Improve quality of life.    Diagnosis     Chronic pain (NIQ07-VS G89.29).     Chronic Lumbar Pain (QLV26-RF M54.5).     Course:  Well controlled, Unchanged.    Plan:  Continue with current medication(s), dose and treatment plan.  Refill(s) have been printed and signed or sent electronically to the pharmacy.   Goals, benefits, side effects, and risks of chronic narcotic therapy reviewed and discussed.  Patient verbalized understanding and is in agreement to plan.    Patient instructed to follow up in: _ 2 mos  .    Patient Instructions:       Counseled: Patient, Regarding diagnosis, Regarding treatment, Regarding medications, Verbalized understanding.    Orders     Orders   Charges (Evaluation and Management):  51212 office outpatient visit 15 minutes (Charge) (Order): Quantity: 1, Chronic Lumbar Pain.     Orders (Selected)   Outpatient Orders  Completed  BASIC PAIN MGT DRUG PANEL     Test Code: 68569 and 30114  OXYCODONE - TAKEN 2:00 PM TODAY.: BASIC PAIN MGT DRUG PANEL     Test Code: 39885 and 90214  OXYCODONE - TAKEN 2:00 PM TODAY., Instructions: Includes:  Amphetamines, Barbiturates, Benzodiazepines, Cocaine, Marijuana, Methadone, Methylphenidate, Opiates, Oxycodone, Phencycidine  Prescriptions  Prescribed  oxyCODONE 10 mg oral tablet: 1 tab(s) ( 10 mg ), po, qid, # 120 tab(s), 0 Refill(s), Type: Maintenance.             Visit Information      Date of Service: 2017 03:54 pm  Performing Location: [facility]  Encounter#: [id number]      Primary Care Provider (PCP):  Floyd Baker MD    NPI# [id number]    Accompanied by:  No one.    Source of history:  Self.    Referral source:  Self.    History limitation:  None.       Chief Complaint   5/8/2017 4:08 PM CDT     Pt here for pain med ck        History of Present Illness   Back specialist has recommended another lumbar fusion which will take place in August 2017    Has the patients condition changed significantly since their last visit?     _No  Has the patient been compliant with treatments, including non-opioid treatments?    _Yes  Has there been any aberrant opioid behavior since the last visit?     _No  Is the total opioid dose less than 90 morphine equivalents?    _Yes  Is there reasonable progress toward meeting established functional goals?     _Yes  Was the last drug test consistent with prescribed medications?     _Yes  Is another  drug test due at this visit?     _No  Has the ePDMP been reviewed?     _Yes  Is the patient complying with their signed Controlled Substance Abuse Agreement?     _Yes  Based on all the above, is the patient still a candidate for chronic opioid therapy?     _Yes           Review of Systems   Constitutional:  Negative, No fatigue, No decreased activity.    Eye:  Negative.    Ear/Nose/Mouth/Throat:  Negative.    Respiratory:  Negative.    Cardiovascular:  Negative.    Gastrointestinal:  Negative, No nausea, No vomiting, No constipation.    Genitourinary:  Negative.    Hematology/Lymphatics:  Negative.    Endocrine:  Negative.    Immunologic:  Negative.    Musculoskeletal:  Negative except as documented in history of present illness.    Integumentary:  Negative, No pruritus.    Neurologic:  Negative, No abnormal balance, No confusion.    Psychiatric:  Negative, No anxiety, No depression, No memory difficulties, No sleeping problems.    All other systems reviewed and negative      Health Status   Allergies:    Nonallergic Reactions (Selected)  Severe  Doxycycline (Vomiting)   Medications:  (Selected)    Prescriptions  Prescribed  SUMAtriptan 100 mg oral tablet: 1 tab(s) ( 100 mg ), PO, Once, PRN: for migraine headache, # 9 tab(s), 1 Refill(s), Type: Soft Stop, Pharmacy: [facility], 1 tab(s) po once,PRN:for migraine headache  cyclobenzaprine 10 mg oral tablet: 1 tab(s) ( 10 mg ), PO, q6 hrs, PRN: for spasm, # 50 tab(s), 1 Refill(s), Type: Maintenance, Pharmacy: [facility], 1 tab(s) po q6 hrs,PRN:for spasm  naproxen 500 mg oral tablet: 1 tab(s) ( 500 mg ), PO, tid, # 90 tab(s), 5 Refill(s), Type: Maintenance, Pharmacy: [facility], 1 tab(s) po tid  oxyCODONE 10 mg oral tablet: 1 tab(s) ( 10 mg ), po, qid, # 120 tab(s), 0 Refill(s), Type: Maintenance  terbinafine 250 mg oral tablet: 1 tab(s) ( 250 mg ), PO, Daily, # 90 tab(s), 0 Refill(s), Type: Maintenance, Pharmacy: [facility], 1 tab(s) po daily  traZODone 100 mg oral tablet: 1 tab(s) ( 100 mg ), PO, hs, # 30 tab(s), 5 Refill(s), Type: Maintenance, Pharmacy: [facility], 1 tab(s) po hs  Documented Medications  Documented  gabapentin: ( 300 mg ), po, tid, 0 Refill(s), Type: Maintenance   Problem list:    All Problems  Cartilage tear / ICD-9-.9 / Confirmed  Chronic Lumbar Pain / ICD-9-.2 / Confirmed  Insomnia / ICD-9-.52 / Confirmed  Migraine Headache / ICD-9-.90 / Confirmed  Mild Episode of Depression / ICD-9-.31 / Confirmed  Obesity / ICD-9-.00 / Probable  Onychomycosis / SNOMED CT 9873540420 / Confirmed  PUD (Peptic Ulcer Disease) / ICD-9-.90 / Confirmed  Resolved: *Hospitalized@[facility] - Pneumonia  Resolved: Tobacco user / ICD-9-.1      Histories   Past Medical History:    Active  PUD (Peptic Ulcer Disease) (533.90)  Chronic Lumbar Pain (724.2)  Onychomycosis (1431219703)  Mild Episode of Depression (296.31)  Obesity (278.00)  Cartilage tear (848.9)  Migraine Headache (346.90)  Insomnia (780.52)  Resolved  *Hospitalized@[facility] - Pneumonia: Onset on 2/14/2015 at 39 years.  Resolved  on 2/19/2015 at 39 years.  Tobacco user (305.1):  Resolved.   Family History:       Procedure history:    Fusion L5-S1 in 2015 at 40 Years.  Comments:  2/3/2015 8:49 AM - Floyd Baker MD  02/05/2015  [facility]  Dr. Rios  Bilateral L5-S1 Decompression Foramina in 2014 at 39 Years.  Comments:  4/29/2014 3:20 PM - Floyd [facility]  05/06/2014    Vasectomy (SNOMED CT 88868181) on 7/26/2012 at 36 Years.  Arthroscopy (SNOMED CT 05478422) in 2011 at 36 Years.  Comments:  7/11/2012 8:59 AM - Miracle Griffiths  Bilateral knee  Polysomnogram (SNOMED CT 663065493) on 8/14/2009 at 33 Years.  Comments:  7/11/2012 9:09 AM - Miracle Griffiths  No significant sleep-disorder findings.  Bruxism.  Follow-up indicated.  Tonsillectomy (SNOMED CT 608280266) in 1980 at 5 Years.   Social History:        Alcohol Assessment: Denies Alcohol Use            Never      Tobacco Assessment: Past            Past, Cigarettes, 4 per day.                     Comments:                      04/29/2014 - Floyd Baker MD                     Quit 04/01/2014      Substance Abuse Assessment            Never      Employment and Education Assessment            Employed                     Comments:                      04/29/2014 - Floyd Baker [employer]      Home and Environment Assessment            Marital status:  (Living together).  Lives with Self, Children, Spouse.  Living situation:               Home/Independent.      Exercise and Physical Activity Assessment: Does not exercise            Exercise frequency: No regular exercise..        Physical Examination   Vital Signs   5/8/2017 4:08 PM CDT Peripheral Pulse Rate 76 bpm    Pulse Site Radial artery    Systolic Blood Pressure 128 mmHg    Diastolic Blood Pressure 74 mmHg    Mean Arterial Pressure 92 mmHg    BP Site Left arm      Measurements from flowsheet : Measurements   5/8/2017 4:08 PM CDT     Weight Measured -  Standard                279.2 lb     General:  Alert and oriented, No acute distress.    Eye:  Pupils are equal, round and reactive to light, Normal conjunctiva.    Respiratory:  Respirations are non-labored.    Cardiovascular:  Normal rate, Regular rhythm, No edema.    Musculoskeletal:  Normal gait.    Integumentary:  Warm, No rash.    Neurologic:  Normal sensory, Normal motor function, No focal deficits.    Psychiatric:  Cooperative, Appropriate mood & affect, Normal judgment.       Professional Services   Counseling Summary:     Counseling included treatment options, risks and benefits, lifestyle changes, current condition, medications, and dose adjustments.    The patient was attentive, and verbalized understanding.    Total visit time _ minutes and > 50% was spent in counseling and management of patients chronic pain condition.

## 2022-02-15 NOTE — NURSING NOTE
CAGE Assessment Entered On:  2/18/2021 11:07 AM CST    Performed On:  2/18/2021 11:06 AM CST by Crissy Kim CMA               Assessment   Have you ever felt you should cut down on your drinking :   No   Have people annoyed you by criticizing your drinking :   No   Have you ever felt bad or guilty about your drinking :   No   Have you ever taken a drink first thing in the morning to steady your nerves or get rid of a hangover (Eye-opener) :   No   CAGE Score :   0    Crissy Kim CMA - 2/18/2021 11:06 AM CST

## 2022-02-15 NOTE — TELEPHONE ENCOUNTER
---------------------  From: Crissy Kim CMA (eRx Pool ([id number] - [place]))   To: Floyd Baker MD;     Sent: 7/22/2019 2:19:33 PM CDT  Subject: FW: Medication Management   Due Date/Time: 7/23/2019 2:12:00 PM CDT             ** Patient matched by Crissy Kim CMA on 7/22/2019 2:19:22 PM CDT **      ------------------------------------------  From: [facility]  To: Floyd Baker MD  Sent: July 22, 2019 2:12:00 PM CDT  Subject: Medication Management  Due: July 23, 2019 2:12:00 PM CDT    ** On Hold Pending Signature **  Drug: testosterone (Testosterone Cypionate 200 mg/mL intramuscular solution)  Inject intramuscular half (1/2) milliliter(ML) TWICE WEEKLY MONDAY/FRIDAY  Quantity: 10 mL       Days Supply: 0         Refills: 0  Substitutions Allowed  Notes from Pharmacy:     Dispensed Drug: testosterone (Testosterone Cypionate 200 mg/mL intramuscular solution)  Inject intramuscular half (1/2) milliliter(ML) TWICE WEEKLY MONDAY/FRIDAY  Quantity: 10 mL       Days Supply: 0         Refills: 0  Substitutions Allowed  Notes from Pharmacy:   ---------------------------------------------------------------  From: Floyd Baker MD   To: [facility]    Sent: 7/23/2019 12:25:05 PM CDT  Subject: FW: Medication Management     ** Submitted: **  Complete:testosterone (testosterone cypionate 200 mg/mL intramuscular solution)   Signed by Floyd Baker MD  7/23/2019 12:25:00 PM    ** Approved **  testosterone (Testosterone Cypionate Solution 200MG/ML)  Inject intramuscular half (1/2) milliliter(ML) TWICE WEEKLY MONDAY/FRIDAY  Qty:  10 mL        Days Supply:  0        Refills:  0          Substitutions Allowed     Route To Pharmacy - [facility]

## 2022-02-15 NOTE — PROGRESS NOTES
Patient:   PEABODY, TIMOTHY J            MRN: [id number]            FIN: [id number]               Age:   41 years     Sex:  Male     :  1975   Associated Diagnoses:   Chronic pain; Chronic Lumbar Pain; Moderate major depression   Author:   Floyd Baker MD      Impression and Plan   Treatment goals:  Decrease pain intensity, Improve psychological state, Improve functional mobility, Maintain functional ADL/IADL, Improve quality of life.    Diagnosis     Chronic pain (LZE79-MN G89.29).     Chronic Lumbar Pain (ORG27-ME M54.5).     Course:  Well controlled, Unchanged.    Plan:  Continue with current medication(s), dose and treatment plan.  Refill(s) have been printed and signed or sent electronically to the pharmacy.   Goals, benefits, side effects, and risks of chronic narcotic therapy reviewed and discussed.  Patient verbalized understanding and is in agreement to plan.    Patient instructed to follow up in: _ 2 mos  .    Patient Instructions:       Counseled: Patient, Regarding diagnosis, Regarding treatment, Regarding medications, Verbalized understanding.    Summary:  Patient hopes to have surgery before the end of the summer.    Orders     Orders (Selected)   Prescriptions  Prescribed  oxyCODONE 10 mg oral tablet: 1 tab(s) ( 10 mg ), po, qid, Instructions: Fill dates: 2017 & 2017, # 120 tab(s), 0 Refill(s), Type: Maintenance.          Diagnosis     Moderate major depression (CRB34-FX F32.1).     Course:  Worsening.    Orders     Orders (Selected)   Prescriptions  Prescribed  DULoxetine 60 mg oral delayed release capsule: 1 cap(s) ( 60 mg ), po, daily, # 30 cap(s), 5 Refill(s), Type: Maintenance, Pharmacy: [facility], 1 cap(s) po daily.        Visit Information      Date of Service: 2017 08:15 am  Performing Location: [facility]  Encounter#: [id number]      Primary Care Provider (PCP):  Floyd Baker MD    NPI# [id number]   Accompanied by:  No one.    Source of  history:  Self.    Referral source:  Self.    History limitation:  None.       Chief Complaint   6/28/2017 8:16 AM CDT    Pt here for med ck        History of Present Illness       Has the patients condition changed significantly since their last visit?     _No  Has the patient been compliant with treatments, including non-opioid treatments?    _Yes  Has there been any aberrant opioid behavior since the last visit?     _No  Is the total opioid dose less than 90 morphine equivalents?    _Yes  Is there reasonable progress toward meeting established functional goals?     _Yes  Was the last drug test consistent with prescribed medications?     _Yes  Is another  drug test due at this visit?     _No  Has the ePDMP been reviewed?     _Yes  Is the patient complying with their signed Controlled Substance Abuse Agreement?     _Yes  Based on all the above, is the patient still a candidate for chronic opioid therapy?     _Yes           Review of Systems   Constitutional:  Negative, No fatigue, No decreased activity.    Eye:  Negative.    Ear/Nose/Mouth/Throat:  Negative.    Respiratory:  Negative.    Cardiovascular:  Negative.    Gastrointestinal:  Negative, No nausea, No vomiting, No constipation.    Genitourinary:  Negative.    Hematology/Lymphatics:  Negative.    Endocrine:  Negative.    Immunologic:  Negative.    Musculoskeletal:  Negative except as documented in history of present illness.    Integumentary:  Negative, No pruritus.    Neurologic:  Negative, No abnormal balance, No confusion.    Psychiatric:  Depression, No anxiety, No memory difficulties, No sleeping problems.    All other systems reviewed and negative      Health Status   Allergies:    Nonallergic Reactions (Selected)  Severe  Doxycycline (Vomiting)   Medications:  (Selected)   Prescriptions  Prescribed  DULoxetine 60 mg oral delayed release capsule: 1 cap(s) ( 60 mg ), po, daily, # 30 cap(s), 5 Refill(s), Type: Maintenance, Pharmacy: [facility],   cap(s) po daily  SUMAtriptan 100 mg oral tablet: 1 tab(s) ( 100 mg ), PO, Once, PRN: for migraine headache, # 9 tab(s), 1 Refill(s), Type: Soft Stop, Pharmacy: [facility], 1 tab(s) po once,PRN:for migraine headache  cyclobenzaprine 10 mg oral tablet: 1 tab(s) ( 10 mg ), PO, q6 hrs, PRN: for spasm, # 50 tab(s), 1 Refill(s), Type: Maintenance, Pharmacy: [facility], 1 tab(s) po q6 hrs,PRN:for spasm  naproxen 500 mg oral tablet: 1 tab(s) ( 500 mg ), PO, tid, # 90 tab(s), 5 Refill(s), Type: Maintenance, Pharmacy: [facility], 1 tab(s) po tid  oxyCODONE 10 mg oral tablet: 1 tab(s) ( 10 mg ), po, qid, # 120 tab(s), 0 Refill(s), Type: Maintenance  traZODone 100 mg oral tablet: 1 tab(s) ( 100 mg ), PO, hs, # 30 tab(s), 5 Refill(s), Type: Maintenance, Pharmacy: [facility], 1 tab(s) po hs  Documented Medications  Documented  Chantix: ( 1 mg ), po, bid, 0 Refill(s), Type: Maintenance  gabapentin: ( 300 mg ), po, tid, 0 Refill(s), Type: Maintenance   Problem list:    All Problems  Cartilage tear / ICD-9-.9 / Confirmed  Chronic Lumbar Pain / ICD-9-.2 / Confirmed  Insomnia / ICD-9-.52 / Confirmed  Migraine Headache / ICD-9-.90 / Confirmed  Mild Episode of Depression / ICD-9-.31 / Confirmed  Obesity / ICD-9-.00 / Probable  Onychomycosis / SNOMED CT 9185129174 / Confirmed  PUD (Peptic Ulcer Disease) / ICD-9-.90 / Confirmed  Resolved: *Hospitalized@[facility] - Pneumonia  Resolved: Tobacco user / ICD-9-.1      Histories   Past Medical History:    Active  PUD (Peptic Ulcer Disease) (533.90)  Chronic Lumbar Pain (724.2)  Onychomycosis (8010437462)  Mild Episode of Depression (296.31)  Obesity (278.00)  Cartilage tear (848.9)  Migraine Headache (346.90)  Insomnia (780.52)  Resolved  *Hospitalized@[facility] - Pneumonia: Onset on 2/14/2015 at 39 years.  Resolved on 2/19/2015 at 39 years.  Tobacco user (305.1):  Resolved.   Family History:       Procedure history:     Fusion L5-S1 in 2015 at 40 Years.  Comments:  2/3/2015 8:49 AM - Floyd Baker MD  02/05/2015  [facility]  Dr. Rios  Bilateral L5-S1 Decompression Foramina in 2014 at 39 Years.  Comments:  4/29/2014 3:20 PM - Floyd Baker MD, Dr.  [facility]  05/06/2014    Vasectomy (SNOMED CT 60110474) on 7/26/2012 at 36 Years.  Arthroscopy (SNOMED CT 54253729) in 2011 at 36 Years.  Comments:  7/11/2012 8:59 AM - Miracle Griffiths  Bilateral knee  Polysomnogram (SNOMED CT 474704019) on 8/14/2009 at 33 Years.  Comments:  7/11/2012 9:09 AM - Miracle Griffiths  No significant sleep-disorder findings.  Bruxism.  Follow-up indicated.  Tonsillectomy (SNOMED CT 929907833) in 1980 at 5 Years.   Social History:        Alcohol Assessment: Denies Alcohol Use            Never      Tobacco Assessment: Past            Past, Cigarettes, 4 per day.                     Comments:                      04/29/2014 - Floyd Baker MD                     Quit 04/01/2014      Substance Abuse Assessment            Never      Employment and Education Assessment            Employed                     Comments:                      04/29/2014 - Floyd Baker MD                     [employer]      Home and Environment Assessment            Marital status:  (Living together).  Lives with Self, Children, Spouse.  Living situation:               Home/Independent.      Exercise and Physical Activity Assessment: Does not exercise            Exercise frequency: No regular exercise..        Physical Examination   Vital Signs   6/28/2017 8:16 AM CDT Peripheral Pulse Rate 88 bpm    Pulse Site Radial artery    Systolic Blood Pressure 128 mmHg    Diastolic Blood Pressure 86 mmHg    Mean Arterial Pressure 100 mmHg    BP Site Left arm    BP Method Manual      Measurements from flowsheet : Measurements   6/28/2017 8:16 AM CDT    Weight Measured - Standard                277.8 lb     PHQ 9 score: 19   General:  Alert and  oriented, No acute distress.    Eye:  Pupils are equal, round and reactive to light, Normal conjunctiva.    Respiratory:  Respirations are non-labored.    Cardiovascular:  Normal rate, Regular rhythm, No edema.    Musculoskeletal:  Normal gait.    Integumentary:  Warm, No rash.    Psychiatric:  Cooperative, Appropriate mood & affect, Normal judgment.       Review / Management   Results review:  Lab results   5/19/2017 8:57 AM CDT Sodium Level 141 mmol/L    Potassium Level 4.7 mmol/L    Chloride Level 106 mmol/L    CO2 Level 26 mmol/L    AGAP 9    Glucose Level 104 mg/dL    BUN 12 mg/dL    Creatinine Level 0.93 mg/dL    BUN/Creat Ratio 13    eGFR  >60    eGFR Non-African American >60    Calcium Level 9.3 mg/dL    Uric Acid 4.1 mg/dL    WBC 5.9    RBC 5.08    Hgb 15.1 g/dL    Hct 45.5 %    MCV 90 fL    MCH 29.7 pg    MCHC 33.2 g/dL    RDW 12.0 %    Platelet 284    MPV 9.1 fL   .       Professional Services   Counseling Summary:     Counseling included treatment options, risks and benefits, lifestyle changes, current condition, medications, and dose adjustments.    The patient was attentive, and verbalized understanding.    Total visit time _ minutes and > 50% was spent in counseling and management of patients chronic pain condition.

## 2022-02-15 NOTE — TELEPHONE ENCOUNTER
---------------------  From: Crissy Kim CMA   To: Floyd Baker MD;     Sent: 2020 2:04:20 PM CST  Subject: General Message     received call from Jesus at [facility].... pt has 2 rx's for Tizanidine... 1 from [facility] and 1 from another physician who is a chronic pain doc...... Jesus will destroy [facility]'s rx and keep the other rx.....

## 2022-02-15 NOTE — PROGRESS NOTES
Patient:   PEABODY, TIMOTHY J            MRN: [id number]            FIN: [id number]               Age:   44 years     Sex:  Male     :  1975   Associated Diagnoses:   None   Author:   Floyd Baker MD      Visit Information      Date of Service: 2020 07:14 am  Performing Location: [facility]  Encounter#: [id number]      Primary Care Provider (PCP):  Floyd Baker MD    NPI# [id number]      Referring Provider:  Floyd Baker MD    NPI# [id number]   Visit type:  Telephone Encounter.    Source of history:  Self.    Location of patient:  _Home  Call Start Time:   _  Call End Time:    _   Referral source:  Self.    History limitation:  None.       Chief Complaint   _      History of Present Illness   Today's visit was conducted via telephone due to the COVID-19 pandemic.     Reason for visit:  _             The patient presents with This is a telephone encounter conducted due to the coronavirus pandemic.  The patient is a 44-year-old male who needs medication refills.  The patient is taking duloxetine and amitriptyline for depression and reports that the depression is under good control with the addition of counseling.  The patient takes testosterone IM for hypogonadism.  The patient takes atorvastatin for hyperlipidemia.  Patient takes metformin for type 2 diabetes.  The patient takes oxycodone and tizanidine for chronic lumbar pain.  The patient takes levothyroxine for hypothyroidism.  The patient feels that his medical problems are all under good control.  He recently tore cartilage in his knee and is actively working with orthopedics in this regard.  We reviewed the patient's last blood test from  and all values were within acceptable limits I did recommend a blood pressure check and a blood test in the near future.  The patient reports no diabetic complications such as visual disturbances or sores on his feet he denies any peripheral neuropathy related to his diabetes he denies  hypoglycemia.  .        Review of Systems   Constitutional:  Negative.    Eye:  Negative.    Ear/Nose/Mouth/Throat:  Negative.    Respiratory:  Negative.    Cardiovascular:  Negative.    Gastrointestinal:  Negative.    Genitourinary:  Negative.    Hematology/Lymphatics:  Negative.    Endocrine:  Negative.    Immunologic:  Negative.    Musculoskeletal:  Negative.    Integumentary:  Negative.    Neurologic:  Negative.    Psychiatric:  Negative.    All other systems reviewed and negative      Health Status   Allergies:    Nonallergic Reactions (Selected)  Severe  Doxycycline (Vomiting)   Medications:  (Selected)   Prescriptions  Prescribed  DULoxetine 60 mg oral delayed release capsule: = 2 cap(s) ( 120 mg ), Oral, daily, # 180 cap(s), 0 Refill(s), Type: Maintenance, Pharmacy: [facility], 70, in, 04/23/20 8:03:00 CDT, Height Measured, 288, lb, 12/09/19 13:24:00 CST, Weight Measured  SUMAtriptan 100 mg oral tablet: = 1 tab(s) ( 100 mg ), PO, Once, PRN: for migraine headache, # 9 tab(s), 5 Refill(s), Type: Soft Stop, Pharmacy: [facility], 1 tab(s) Oral once,PRN:for migraine headache  Testosterone Cypionate 200 mg/mL intramuscular solution: See Instructions, Instructions: Inject intramuscular half (1/2) milliliter(ML) TWICE WEEKLY MONDAY/FRIDAY, # 10 mL, 1 Refill(s), Type: Soft Stop, Pharmacy: [facility], Inject intramuscular half (1/2) milliliter(ML) TWICE WEEKLY MONDAY/FRIDAY  amitriptyline 25 mg oral tablet: = 1 tab(s) ( 25 mg ), PO, hs, # 90 tab(s), 1 Refill(s), Type: Maintenance, Pharmacy: [facility], 1 tab(s) Oral hs  atorvastatin 20 mg oral tablet: = 1 tab(s) ( 20 mg ), PO, Daily, # 90 tab(s), 1 Refill(s), Type: Maintenance, Pharmacy: [facility], 1 tab(s) Oral daily, 70, in, 04/23/20 8:03:00 CDT, Height Measured, 288, lb, 12/09/19 13:24:00 CST, Weight Measured  levothyroxine 25 mcg (0.025 mg) oral tablet: = 1 tab(s) ( 25 mcg ), Oral, daily, # 90 tab(s), 1 Refill(s), ARLET,  Type: Maintenance, Pharmacy: [facility], 1 tab(s) Oral daily, 70, in, 04/23/20 8:03:00 CDT, Height Measured, 288, lb, 12/09/19 13:24:00 CST, Weight Measured  metFORMIN 500 mg oral tablet: = 1 tab(s), Oral, bidwm, # 180 tab(s), 1 Refill(s), Type: Maintenance, Pharmacy: [facility], 1 tab(s) Oral bidwm  oxyCODONE 10 mg oral tablet: = 1 tab(s), Oral, qid, # 120 EA, 0 Refill(s), Type: Soft Stop, Pharmacy: [facility], 1 tab(s) Oral qid, 70, in, 04/23/20 8:03:00 CDT, Height Measured, Weight Measured  tadalafil 10 mg oral tablet: See Instructions, Instructions: 1 tab(s) Oral daily PRN planned sexual activity, # 10 tab(s), 2 Refill(s), Type: Maintenance, Pharmacy: [facility], 1 tab(s) Oral daily PRN planned sexual activity  tiZANidine 4 mg oral tablet: = 1 tab(s) ( 4 mg ), Oral, bid, # 60 tab(s), 0 Refill(s), Type: Maintenance, patient has supply at home (Rx),    Medications          *denotes recorded medication          DULoxetine 60 mg oral delayed release capsule: 120 mg, 2 cap(s), Oral, daily, 180 cap(s), 0 Refill(s).          SUMAtriptan 100 mg oral tablet: 100 mg, 1 tab(s), PO, Once, PRN: for migraine headache, 9 tab(s), 5 Refill(s).          amitriptyline 25 mg oral tablet: 25 mg, 1 tab(s), PO, hs, 90 tab(s), 1 Refill(s).          atorvastatin 20 mg oral tablet: 20 mg, 1 tab(s), PO, Daily, 90 tab(s), 1 Refill(s).          levothyroxine 25 mcg (0.025 mg) oral tablet: 25 mcg, 1 tab(s), Oral, daily, 90 tab(s), 1 Refill(s).          metFORMIN 500 mg oral tablet: 1 tab(s), Oral, bidwm, 180 tab(s), 1 Refill(s).          oxyCODONE 10 mg oral tablet: 1 tab(s), Oral, qid, 120 EA, 0 Refill(s).          tadalafil 10 mg oral tablet: See Instructions, 1 tab(s) Oral daily PRN planned sexual activity, 10 tab(s), 2 Refill(s).          Testosterone Cypionate 200 mg/mL intramuscular solution: See Instructions, Inject intramuscular half (1/2) milliliter(ML) TWICE WEEKLY MONDAY/FRIDAY, 10 mL, 1  Refill(s).          tiZANidine 4 mg oral tablet: 4 mg, 1 tab(s), Oral, bid, 60 tab(s), 0 Refill(s).       Problem list:    All Problems  Benign essential HTN / SNOMED CT 7635088 / Confirmed  Benign familial tremor / SNOMED CT 5798176786 / Confirmed  Cartilage tear / ICD-9-.9 / Confirmed  Chronic Lumbar Pain / ICD-9-.2 / Confirmed  Chronic pain / SNOMED CT 865882561 / Confirmed  Hypothyroidism / SNOMED CT 70646535 / Confirmed  Insomnia / ICD-9-.52 / Confirmed  Low testosterone in male / SNOMED CT 578203474 / Confirmed  Migraine Headache / ICD-9-.90 / Confirmed  Mild Episode of Depression / ICD-9-.31 / Confirmed  Moderate major depression / SNOMED CT 6575143 / Confirmed  Morbid obesity / SNOMED CT 263041157 / Confirmed  Obesity / ICD-9-.00 / Probable  Onychomycosis / SNOMED CT 9400864756 / Confirmed  PUD (Peptic Ulcer Disease) / ICD-9-.90 / Confirmed  Type 2 diabetes mellitus / SNOMED CT 493506356 / Confirmed  Resolved: *Hospitalized@[facility] - Pneumonia  Resolved: Tobacco user / ICD-9-.1      Histories   Past Medical History:    Active  PUD (Peptic Ulcer Disease) (533.90)  Chronic Lumbar Pain (724.2)  Onychomycosis (5723903504)  Mild Episode of Depression (296.31)  Obesity (278.00)  Cartilage tear (848.9)  Migraine Headache (346.90)  Insomnia (780.52)  Resolved  *Hospitalized@[facility] - Pneumonia: Onset on 2/14/2015 at 39 years.  Resolved on 2/19/2015 at 39 years.  Tobacco user (305.1):  Resolved.   Family History:       Procedure history:    Spinal fusion (SNOMED CT 87047447) performed by Freddy Duvall MD on 8/22/2017 at 41 Years.  Comments:  8/31/2017 8:02 AM CDT - Ashley Amaro  L4-5.  Fusion L5-S1 in 2015 at 40 Years.  Comments:  2/3/2015 8:49 AM RYNE - Floyd Baker MD  02/05/2015  [facility]  Dr. Rios  Bilateral L5-S1 Decompression Foramina in 2014 at 39 Years.  Comments:  4/29/2014 3:20 PM RENUKAT - Samuel AMEZCUA, Floyd Rios  [facility]  05/06/2014    Vasectomy (SNOMED CT 96231453) on 7/26/2012 at 36 Years.  Arthroscopy (SNOMED CT 11471225) in 2011 at 36 Years.  Comments:  7/11/2012 8:59 AM RENUKAT Miracle Tavera  Bilateral knee  Polysomnogram (SNOMED CT 804036332) on 8/14/2009 at 33 Years.  Comments:  7/11/2012 9:09 AM Miracle White  No significant sleep-disorder findings.  Bruxism.  Follow-up indicated.  Tonsillectomy (SNOMED CT 579827834) in 1980 at 5 Years.   Social History:        Alcohol Assessment: Denies Alcohol Use            Never      Tobacco Assessment: Past            Past, Cigarettes, 4 per day.                     Comments:                      04/29/2014 - Floyd Baker MD                     Quit 04/01/2014      Substance Abuse Assessment            Never      Employment and Education Assessment            Employed                     Comments:                      04/29/2014 - Floyd Baker MD [employer]      Home and Environment Assessment            Marital status:  (Living together).  Lives with Self, Children, Spouse.  Living situation:               Home/Independent.      Exercise and Physical Activity Assessment: Does not exercise            Exercise frequency: No regular exercise..        Physical Examination   General:  Alert and oriented, No acute distress.    Respiratory:  Respirations are non-labored.    Psychiatric:  Cooperative, Appropriate mood & affect, Normal judgment.       Health Maintenance      Recommendations     Pending (in the next year)        OverDue           Depression Screen (Male) due  06/28/18  and every 1  year(s)           DM - HgbA1c due  02/07/20  and every 3  month(s)           DM - Foot Exam due  06/13/20  and every 1  year(s)        Due            DM - Communication with Managing Provider due  08/12/20  and every 1  year(s)           DM - Microalbumin due  08/12/20  and every 1  year(s)        Near Due            Influenza Vaccine near due   08/31/20  and every 1  year(s)        Due In Future            Lipid Disorders Screen (Male) not due until  11/07/20  and every 1  year(s)           Type 2 Diabetes Mellitus Screen (Male) not due until  11/07/20  and every 1  year(s)           Body Mass Index Check (Male) not due until  12/09/20  and every 1  year(s)           High Blood Pressure Screen (Male) not due until  12/09/20  and every 1  year(s)           DM - Eye Exam not due until  01/27/21  and every 1  year(s)           Tobacco Use Screen (Male) not due until  04/23/21  and every 1  year(s)     Satisfied (in the past 1 year)        Satisfied            Body Mass Index Check (Male) on  12/09/19.           Body Mass Index Check (Male) on  11/18/19.           Body Mass Index Check (Male) on  09/05/19.           Body Mass Index Check (Male) on  08/28/19.           DM - Eye Exam on  01/27/20.           DM - Eye Exam on  01/27/20.           DM - Eye Exam on  01/27/20.           DM - Eye Exam on  01/27/20.           DM - Eye Exam on  01/27/20.           DM - HgbA1c on  11/07/19.           High Blood Pressure Screen (Male) on  12/09/19.           High Blood Pressure Screen (Male) on  11/18/19.           High Blood Pressure Screen (Male) on  11/18/19.           High Blood Pressure Screen (Male) on  09/30/19.           High Blood Pressure Screen (Male) on  09/05/19.           High Blood Pressure Screen (Male) on  08/29/19.           High Blood Pressure Screen (Male) on  08/28/19.           Influenza Vaccine on  12/15/19.           Lipid Disorders Screen (Male) on  11/07/19.           Lipid Disorders Screen (Male) on  11/07/19.           Lipid Disorders Screen (Male) on  11/07/19.           Lipid Disorders Screen (Male) on  11/07/19.           Obesity Screen and Counseling (Male) on  12/09/19.           Obesity Screen and Counseling (Male) on  11/18/19.           Obesity Screen and Counseling (Male) on  09/05/19.           Obesity Screen and Counseling (Male) on   08/28/19.           Tobacco Use Screen (Male) on  04/23/20.           Tobacco Use Screen (Male) on  12/09/19.           Tobacco Use Screen (Male) on  11/18/19.           Tobacco Use Screen (Male) on  09/05/19.           Tobacco Use Screen (Male) on  08/28/19.           Type 2 Diabetes Mellitus Screen (Male) on  11/07/19.

## 2022-02-15 NOTE — TELEPHONE ENCOUNTER
Entered by Crissy Kim CMA on January 31, 2019 4:36:03 PM CST  ---------------------  From: Crissy Kim CMA   To: [facility]    Sent: 1/31/2019 4:36:03 PM CST  Subject: Medication Management     ** Submitted: **  Order:metFORMIN (metFORMIN 500 mg oral tablet)  1 tab(s)  Oral  bidwm  Qty:  180 tab(s)        Refills:  0          ARLET     Route To [facility]    Signed by Crissy Kim CMA  1/31/2019 4:35:00 PM    ** Not Approved:  **  metFORMIN (MetFORMIN HCl Tablet 500 MG)  Take one (1) tablet by mouth twice daily with meals  Qty:  60 EA        Days Supply:  0        Refills:  0          ARLET     Route To [facility]   Signed by Crissy Kim CMA            ** Submitted: **  Order:levothyroxine (levothyroxine 25 mcg (0.025 mg) oral tablet)  1 tab(s)  Oral  daily  Qty:  90 tab(s)        Refills:  0          ARLET     Route To [facility]    Signed by Crissy Kim CMA  1/31/2019 4:35:00 PM    ** Not Approved:  **  levothyroxine (Levothyroxine Sodium Tablet 25 MCG)  Take one (1) tablet by mouth daily before breakfast  Qty:  30 EA        Days Supply:  0        Refills:  0          ARLET     Route To [facility]   Signed by Crissy Kim CMA            ** Patient matched by Crissy Kim CMA on 1/31/2019 4:34:53 PM CST **      ------------------------------------------  From: [facility]  To: Floyd Baker MD  Sent: January 31, 2019 4:31:54 PM CST  Subject: Medication Management  Due: February 1, 2019 4:31:54 PM CST    ** On Hold Pending Signature **  Drug: levothyroxine (levothyroxine 25 mcg (0.025 mg) oral tablet)  Take one (1) tablet by mouth daily before breakfast  Quantity: 30 EA       Days Supply: 0         Refills: 0  Substitutions Allowed  Notes from Pharmacy:     Dispensed Drug: levothyroxine (levothyroxine 25 mcg (0.025 mg) oral tablet)  Take one (1) tablet by mouth daily before breakfast  Quantity: 30 EA        Days Supply: 0         Refills: 0  Substitutions Allowed  Notes from Pharmacy:     ** On Hold Pending Signature **  Drug: metFORMIN (metFORMIN 500 mg oral tablet)  Take one (1) tablet by mouth twice daily with meals  Quantity: 60 EA       Days Supply: 0         Refills: 0  Substitutions Allowed  Notes from Pharmacy:     Dispensed Drug: metFORMIN (metFORMIN 500 mg oral tablet)  Take one (1) tablet by mouth twice daily with meals  Quantity: 60 EA       Days Supply: 0         Refills: 0  Substitutions Allowed  Notes from Pharmacy:   ------------------------------------------

## 2022-02-15 NOTE — PROGRESS NOTES
Patient:   PEABODY, TIMOTHY J            MRN: [id number]            FIN: [id number]               Age:   43 years     Sex:  Male     :  1975   Associated Diagnoses:   Chronic Lumbar Pain; Benign familial tremor   Author:   Floyd Baker MD      Impression and Plan   Treatment goals:  Decrease pain intensity, Improve psychological state, Improve functional mobility, Maintain functional ADL/IADL, Improve quality of life.    Diagnosis     Chronic Lumbar Pain (OYM27-FQ M54.5).     Course:  Unchanged.    Orders     Orders   Charges (Evaluation and Management):  36715 office outpatient visit 15 minutes (Charge) (Order): Quantity: 1, Chronic Lumbar Pain.     Orders (Selected)   Prescriptions  Prescribed  oxyCODONE 10 mg oral tablet: = 1 tab(s), Oral, qid, # 120 EA, 0 Refill(s), Type: Soft Stop, Pharmacy: [facility], 1 tab(s) Oral qid.     Diagnosis     Benign familial tremor (BOD80-FN G25.0).     Course:  Worsening.    Orders     Orders (Selected)   Prescriptions  Prescribed  propranolol 120 mg oral capsule, extended release: = 1 cap(s) ( 120 mg ), Oral, daily, # 30 cap(s), 0 Refill(s), Type: Maintenance, Pharmacy: [facility], 1 cap(s) Oral daily.     Plan:  follow up recheck in two weeks.       Visit Information      Date of Service: 2019 03:17 pm  Performing Location: [facility]  Encounter#: [id number]      Primary Care Provider (PCP):  Floyd Baker MD    NPI# [id number]   Accompanied by:  No one.    Source of history:  Self.    Referral source:  Self.    History limitation:  None.       Chief Complaint   2019 3:19 PM CDT    Pt here for med ck        History of Present Illness       Has the patients condition changed significantly since their last visit?     _No  Has the patient been compliant with treatments, including non-opioid treatments?    _Yes  Has there been any aberrant opioid behavior since the last visit?     _No  Is the total opioid dose less than 90 morphine  equivalents?    _Yes  Is there reasonable progress toward meeting established functional goals?     _Yes  Was the last drug test consistent with prescribed medications?     _Yes  Is another  drug test due at this visit?     _No  Has the ePDMP been reviewed?     _Yes  Is the patient complying with their signed Controlled Substance Abuse Agreement?     _Yes  Based on all the above, is the patient still a candidate for chronic opioid therapy?     _Yes                  The patient presents for a follow-up evaluation of pain.  The location of the pain is same as previous visit, bilaterally on the, back.  The quality of the patient's pain is described as worse.  The patients average pain was rated 9 /10 on the pain scale.  The pain is constant.  Exacerbating factors consist of recent surgery.  Relieving factors consist of analgesics.  Associated symptoms consist of decreased activity tolerance, depression, fatigue, insomnia and irritability.  Compliance problems: none.        Review of Systems   Constitutional:  Negative, No fatigue, No decreased activity.    Eye:  Negative.    Ear/Nose/Mouth/Throat:  Negative.    Respiratory:  Negative.    Cardiovascular:  Negative.    Gastrointestinal:  Negative, No nausea, No vomiting, No constipation.    Genitourinary:  Negative.    Hematology/Lymphatics:  Negative.    Endocrine:  Negative.    Immunologic:  Negative.    Musculoskeletal:  Negative except as documented in history of present illness.    Integumentary:  Negative, No pruritus.    Neurologic:  Tremor, No abnormal balance, No confusion.    Psychiatric:  Depression, No anxiety, No memory difficulties, No sleeping problems.    All other systems reviewed and negative      Health Status   Allergies:    Nonallergic Reactions (Selected)  Severe  Doxycycline (Vomiting)   Medications:  (Selected)   Prescriptions  Prescribed  DULoxetine 60 mg oral delayed release capsule: = 2 cap(s) ( 120 mg ), po, daily, # 180 cap(s), 1 Refill(s),  Type: Maintenance, Pharmacy: [facility], 2 cap(s) Oral daily  SUMAtriptan 100 mg oral tablet: = 1 tab(s) ( 100 mg ), PO, Once, PRN: for migraine headache, # 9 tab(s), 5 Refill(s), Type: Soft Stop, Pharmacy: [facility], 1 tab(s) Oral once,PRN:for migraine headache  Testosterone Cypionate 200 mg/mL intramuscular solution: See Instructions, Instructions: Inject intramuscular half (1/2) milliliter(ML) TWICE WEEKLY MONDAY/FRIDAY, # 10 mL, 1 Refill(s), Type: Soft Stop, Pharmacy: [facility], Inject intramuscular half (1/2) milliliter(ML) TWICE WEEKLY MONDAY/FRIDAY  amitriptyline 25 mg oral tablet: = 1 tab(s) ( 25 mg ), PO, hs, # 90 tab(s), 1 Refill(s), Type: Maintenance, Pharmacy: [facility], 1 tab(s) Oral hs  atorvastatin 20 mg oral tablet: = 1 tab(s) ( 20 mg ), PO, Daily, # 90 tab(s), 1 Refill(s), Type: Maintenance, Pharmacy: [facility], 1 tab(s) Oral daily  levothyroxine 25 mcg (0.025 mg) oral tablet: = 1 tab(s) ( 25 mcg ), Oral, daily, # 90 tab(s), 1 Refill(s), ARLET, Type: Maintenance, Pharmacy: [facility], 1 tab(s) Oral daily  metFORMIN 500 mg oral tablet: = 1 tab(s), Oral, bidwm, # 180 tab(s), 1 Refill(s), ARLET, Type: Maintenance, Pharmacy: [facility], 1 tab(s) Oral bidwm  oxyCODONE 10 mg oral tablet: = 1 tab(s), Oral, qid, # 120 EA, 0 Refill(s), Type: Soft Stop, Pharmacy: [facility], 1 tab(s) Oral qid  propranolol 120 mg oral capsule, extended release: = 1 cap(s) ( 120 mg ), Oral, daily, # 30 cap(s), 0 Refill(s), Type: Maintenance, Pharmacy: [facility], 1 cap(s) Oral daily  raNITIdine 150 mg oral capsule: = 1 cap(s) ( 150 mg ), PO, daily, # 90 cap(s), 1 Refill(s), Type: Maintenance, Pharmacy: [facility], 1 cap(s) Oral daily   Problem list:    All Problems  Benign familial tremor / SNOMED CT 4724487425 / Confirmed  Cartilage tear / ICD-9-.9 / Confirmed  Chronic Lumbar Pain / ICD-9-.2 / Confirmed  Chronic pain / SNOMED CT  136278237 / Confirmed  Hypothyroidism / SNOMED CT 96022013 / Confirmed  Insomnia / ICD-9-.52 / Confirmed  Low testosterone in male / SNOMED CT 195177023 / Confirmed  Migraine Headache / ICD-9-.90 / Confirmed  Mild Episode of Depression / ICD-9-.31 / Confirmed  Moderate major depression / SNOMED CT 5357235 / Confirmed  Morbid obesity / SNOMED CT 314460569 / Confirmed  Obesity / ICD-9-.00 / Probable  Onychomycosis / SNOMED CT 1170939250 / Confirmed  PUD (Peptic Ulcer Disease) / ICD-9-.90 / Confirmed  Type 2 diabetes mellitus / SNOMED CT 312925529 / Confirmed  Resolved: *Hospitalized@[facility] Pneumonia  Resolved: Tobacco user / ICD-9-.1      Histories   Past Medical History:    Active  PUD (Peptic Ulcer Disease) (533.90)  Chronic Lumbar Pain (724.2)  Onychomycosis (1702405728)  Mild Episode of Depression (296.31)  Obesity (278.00)  Cartilage tear (848.9)  Migraine Headache (346.90)  Insomnia (780.52)  Resolved  *Hospitalized@[facility] - Pneumonia: Onset on 2/14/2015 at 39 years.  Resolved on 2/19/2015 at 39 years.  Tobacco user (305.1):  Resolved.   Family History:       Procedure history:    Spinal fusion (SNOMED CT 13876934) performed by Jadiel AMEZCUA Dillon on 8/22/2017 at 41 Years.  Comments:  8/31/2017 8:02 AM CDT - Ashley Amaro  L4-5.  Fusion L5-S1 in 2015 at 40 Years.  Comments:  2/3/2015 8:49 AM Floyd Raines MD  02/05/2015  [facility]  Dr. Rios  Bilateral L5-S1 Decompression Foramina in 2014 at 39 Years.  Comments:  4/29/2014 3:20 PM DERRICK - Floyd Baker MD, Dr.  [facility]  05/06/2014    Vasectomy (SNOMED CT 96620330) on 7/26/2012 at 36 Years.  Arthroscopy (SNOMED CT 66159635) in 2011 at 36 Years.  Comments:  7/11/2012 8:59 AM CDT - Miracle Griffiths  Bilateral knee  Polysomnogram (SNOMED CT 722527445) on 8/14/2009 at 33 Years.  Comments:  7/11/2012 9:09 AM DERRICK - Miracle Griffiths  No significant sleep-disorder findings.  Bruxism.  Follow-up  indicated.  Tonsillectomy (SNOMED CT 633892798) in 1980 at 5 Years.   Social History:        Alcohol Assessment: Denies Alcohol Use            Never      Tobacco Assessment: Past            Past, Cigarettes, 4 per day.                     Comments:                      04/29/2014 - Floyd Baker MD                     Quit 04/01/2014      Substance Abuse Assessment            Never      Employment and Education Assessment            Employed                     Comments:                      04/29/2014 - Floyd Baker MD [employer]      Home and Environment Assessment            Marital status:  (Living together).  Lives with Self, Children, Spouse.  Living situation:               Home/Independent.      Exercise and Physical Activity Assessment: Does not exercise            Exercise frequency: No regular exercise..        Physical Examination   Vital Signs   8/28/2019 3:19 PM CDT Peripheral Pulse Rate 128 bpm  HI    Systolic Blood Pressure 148 mmHg  HI    Diastolic Blood Pressure 86 mmHg  HI    Mean Arterial Pressure 107 mmHg    Oxygen Saturation 96 %      Measurements from flowsheet : Measurements   8/28/2019 3:19 PM CDT Height Measured - Standard 70 in    Weight Measured - Standard 288 lb    BSA 2.54 m2    Body Mass Index 41.32 kg/m2  HI      General:  Alert and oriented, Moderate distress.    Eye:  Pupils are equal, round and reactive to light, Normal conjunctiva.    Respiratory:  Respirations are non-labored.    Cardiovascular:  Normal rate, Regular rhythm, No edema.    Musculoskeletal:  Normal gait, wearing back brace, fine tremor bilateral - patient states father had this as well.    Integumentary:  Warm, No rash.    Psychiatric:  Cooperative, Appropriate mood & affect, Normal judgment.       Review / Management   Results review

## 2022-02-15 NOTE — TELEPHONE ENCOUNTER
---------------------  From: Crissy Kim CMA (eRx Pool ([address]))   To: Floyd Baker MD;     Sent: 12/3/2020 12:39:42 PM CST  Subject: FW: Medication Management   Due Date/Time: 12/4/2020 11:07:00 AM CST           ------------------------------------------  From: [facility]  To: Ryne Knowles MD  Sent: December 3, 2020 11:07:07 AM CST  Subject: Medication Management  Due: December 2, 2020 4:42:24 PM CST     ** On Hold Pending Signature **     Drug: oxyCODONE (oxyCODONE 10 mg oral tablet), ONE (1) TAB(S) ORAL FOUR TIMES DAILY  Quantity: 120 tab(s)  Days Supply: 30  Refills: 0  Substitutions Allowed  Notes from Pharmacy:     Dispensed Drug: oxyCODONE (oxyCODONE 10 mg oral tablet), ONE (1) TAB(S) ORAL FOUR TIMES DAILY  Quantity: 120 tab(s)  Days Supply: 30  Refills: 0  Substitutions Allowed  Notes from Pharmacy:  ------------------------------------------  ** Submitted: **  Order:oxyCODONE (oxyCODONE 10 mg oral tablet)  1 tab(s)  Oral  qid  Qty:  120 EA        Refills:  0          Route To Pharmacy - [facility]    Signed by Floyd Baker MD  12/7/2020 2:44:00 PM [facility]

## 2022-02-15 NOTE — TELEPHONE ENCOUNTER
---------------------  From: Aggie Sheppard CMA (eRx Pool (32224_[facility]))   To: Advanced Practice Provider Pool ([facility]);     Sent: 4/28/2021 5:57:35 AM CDT  Subject: FW: Medication Management   Due Date/Time: 4/27/2021 5:13:00 PM CDT     LYDIA out of clinic    Last visit 2/18/21 depression  Last filled 2/18/21 #90, 0        ** Patient matched by Aggie Sheppard CMA on 4/28/2021 5:55:24 AM CDT **      ------------------------------------------  From: [facility]  To: Floyd Baker MD  Sent: April 26, 2021 5:13:32 PM CDT  Subject: Medication Management  Due: April 27, 2021 12:41:41 AM CDT     ** On Hold Pending Signature **     Drug: amphetamine-dextroamphetamine (amphetamine-dextroamphetamine 10 mg oral tablet), TAKE ONE (1) TABLET BY MOUTH THREE (3) TIMES DAILY  Quantity: 90 tab(s)  Days Supply: 30  Refills: 0  Substitutions Allowed  Notes from Pharmacy:     Dispensed Drug: amphetamine-dextroamphetamine (amphetamine-dextroamphetamine 10 mg oral tablet), TAKE ONE (1) TABLET BY MOUTH THREE (3) TIMES DAILY  Quantity: 90 tab(s)  Days Supply: 30  Refills: 0  Substitutions Allowed  Notes from Pharmacy:  ---------------------------------------------------------------  From: Blanca Beckwith (Advanced Practice Provider Pool ([facility]))   To: eRx Pool ([address]_[place] [facility] [place]);     Sent: 4/28/2021 7:14:31 AM CDT  Subject: RE: Medication Management     this is a controlled sub, he will need to make an appointment to establish care with new provider---------------------  From: Aggie Sheppard CMA   To: [facility]    Sent: 4/28/2021 1:01:44 PM CDT  Subject: RE: Medication Management     ** Not Approved: Patient needs appointment **  amphetamine-dextroamphetamine (AMPHET/DEXTR 10MG)  TAKE ONE (1) TABLET BY MOUTH THREE (3) TIMES DAILY  Qty:  90 tab(s)        Days Supply:  30        Refills:  0          Substitutions Allowed     Route To Pharmacy - [facility]   Signed by Silver  REA, Aggie

## 2022-02-15 NOTE — LETTER
(Inserted Image. Unable to display)     October 13, 2020      DAVID PEABODY  [address] [place], [place] [id number]          Dear DAVID,      Thank you for selecting [facility] ([facility]viously [facility] for your healthcare needs.      Our records indicate you are due for the following services:     Follow-up office visit / Medication Check.      To schedule an appointment or if you have further questions, please contact your primary clinic:   [facility]       [phone]   [facility]       [phone]              [facility]     [phone]      Powered by [employer]    Sincerely,    Floyd Baker MD

## 2022-02-15 NOTE — PROGRESS NOTES
Patient:   PEABODY, TIMOTHY J            MRN: [id number]            FIN: [id number]               Age:   41 years     Sex:  Male     :  1975   Associated Diagnoses:   None   Author:   Garland Diaz MD      Visit Information      Date of Service: 2017 08:09 am  Performing Location: [facility]  Encounter#: [id number]      Primary Care Provider (PCP):  Floyd Baker MD    NPI# [id number]      Referring Provider:  No referring provider recorded for selected visit.      Chief Complaint   2017 8:16 AM CDT    Pt c/o pain in bottom of both feet. Having a hard time walking.        History of Present Illness   He complains of a pain that began in both lower extremities and traveled down to his feet; that settled in the balls of his feet. Now seems to have settled in the first MCP of the left toe. Seemed to be a gradually dveloping pain.       Review of Systems   Constitutional:  No fever.             Health Status   Allergies:    Nonallergic Reactions (Selected)  Severe  Doxycycline (Vomiting)   Medications:  (Selected)   Prescriptions  Prescribed  SUMAtriptan 100 mg oral tablet: 1 tab(s) ( 100 mg ), PO, Once, PRN: for migraine headache, # 9 tab(s), 1 Refill(s), Type: Soft Stop, Pharmacy: [facility], 1 tab(s) po once,PRN:for migraine headache  cyclobenzaprine 10 mg oral tablet: 1 tab(s) ( 10 mg ), PO, q6 hrs, PRN: for spasm, # 50 tab(s), 1 Refill(s), Type: Maintenance, Pharmacy: [facility], 1 tab(s) po q6 hrs,PRN:for spasm  naproxen 500 mg oral tablet: 1 tab(s) ( 500 mg ), PO, tid, # 90 tab(s), 5 Refill(s), Type: Maintenance, Pharmacy: [facility], 1 tab(s) po tid  oxyCODONE 10 mg oral tablet: 1 tab(s) ( 10 mg ), po, qid, # 120 tab(s), 0 Refill(s), Type: Maintenance  terbinafine 250 mg oral tablet: 1 tab(s) ( 250 mg ), PO, Daily, # 90 tab(s), 0 Refill(s), Type: Maintenance, Pharmacy: [facility], 1 tab(s) po daily  traZODone 100 mg oral tablet: 1 tab(s) ( 100 mg  ), PO, hs, # 30 tab(s), 5 Refill(s), Type: Maintenance, Pharmacy: [facility], 1 tab(s) po hs  Documented Medications  Documented  gabapentin: ( 300 mg ), po, tid, 0 Refill(s), Type: Maintenance   Problem list:    All Problems (Selected)  Cartilage tear / ICD-9-.9 / Confirmed  Chronic Lumbar Pain / ICD-9-.2 / Confirmed  Insomnia / ICD-9-.52 / Confirmed  Migraine Headache / ICD-9-.90 / Confirmed  Mild Episode of Depression / ICD-9-.31 / Confirmed  Obesity / ICD-9-.00 / Probable  Onychomycosis / SNOMED CT 0540830988 / Confirmed  PUD (Peptic Ulcer Disease) / ICD-9-.90 / Confirmed      Histories   Past Medical History:    Active  PUD (Peptic Ulcer Disease) (533.90)  Chronic Lumbar Pain (724.2)  Onychomycosis (4285696336)  Mild Episode of Depression (296.31)  Obesity (278.00)  Cartilage tear (848.9)  Migraine Headache (346.90)  Insomnia (780.52)  Resolved  *Hospitalized@[facility] - Pneumonia: Onset on 2/14/2015 at 39 years.  Resolved on 2/19/2015 at 39 years.  Tobacco user (305.1):  Resolved.   Family History:       Procedure history:    Fusion L5-S1 in 2015 at 40 Years.  Comments:  2/3/2015 8:49 AM - Floyd Baker MD  02/05/2015  [facility]  Dr. Rios  Bilateral L5-S1 Decompression Foramina in 2014 at 39 Years.  Comments:  4/29/2014 3:20 PM - Floyd Baker MD, Dr.  [facility]  05/06/2014    Vasectomy (10444880) on 7/26/2012 at 36 Years.  Arthroscopy (99649866) in 2011 at 36 Years.  Comments:  7/11/2012 8:59 AM - Miracle Griffiths  Bilateral knee  Polysomnogram (523620712) on 8/14/2009 at 33 Years.  Comments:  7/11/2012 9:09 AM - Miracle Griffiths  No significant sleep-disorder findings.  Bruxism.  Follow-up indicated.  Tonsillectomy (739986572) in 1980 at 5 Years.   Social History:        Alcohol Assessment: Denies Alcohol Use            Never      Tobacco Assessment: Past            Past, Cigarettes, 4 per day.                     Comments:                       04/29/2014 - Floyd Baker MD 04/01/2014      Substance Abuse Assessment            Never      Employment and Education Assessment            Employed                     Comments:                      04/29/2014 - Floyd Baker MD [employer]      Home and Environment Assessment            Marital status:  (Living together).  Lives with Self, Children, Spouse.  Living situation:               Home/Independent.      Exercise and Physical Activity Assessment: Does not exercise            Exercise frequency: No regular exercise..        Physical Examination   Vital Signs   5/19/2017 8:16 AM CDT Temperature Tympanic 98.3 DegF    Peripheral Pulse Rate 96 bpm    Systolic Blood Pressure 130 mmHg    Diastolic Blood Pressure 90 mmHg    Mean Arterial Pressure 103 mmHg      Measurements from flowsheet : Measurements   5/19/2017 8:16 AM CDT Height Measured - Standard 70 in    Weight Measured - Standard 278 lb    BSA 2.49 m2    Body Mass Index 39.88 kg/m2      Apepars well  Neuro - tremulous  Left foot - ther is tenderness at 1 st MCP joint with some pain with passive motion. Slighlty warm.      Review / Management   Results review:  Lab results   5/19/2017 8:57 AM CDT Sodium Level 141 mmol/L     Potassium Level 4.7 mmol/L     Chloride Level 106 mmol/L     CO2 Level 26 mmol/L     AGAP 9     Glucose Level 104 mg/dL     BUN 12 mg/dL     Creatinine 0.93 mg/dL     BUN/Creat Ratio 13     eGFR  >60     eGFR Non-African American >60     Calcium Level 9.3 mg/dL     Uric Acid 4.1 mg/dL     WBC 5.9     RBC 5.08     Hgb 15.1 g/dL     Hct 45.5 %     MCV 90 fL     MCH 29.7 pg     MCHC 33.2 g/dL     RDW 12.0 %     Platelet 284     MPV 9.1 fL    5/8/2017 4:31 PM CDT U pH 6.41 (Modified)    U Creatinine 241.2 mg/dL (Modified)    Pain Management Prescribed Drug 1 Oxycodone (Modified)    U Amphetamines NEGATIVE ng/mL (Modified)    U Amphetamines medMATCH  CONSISTENT (Modified)    U Andie Scrn NEGATIVE ng/mL (Modified)    U Andie medMATCH CONSISTENT (Modified)    U Benzodia Scrn NEGATIVE ng/mL (Modified)    U Benzodia medMATCH CONSISTENT (Modified)    U Cannabis Metabolite medMATCH CONSISTENT (Modified)    U Cannabis Metabolite Scrn NEGATIVE ng/mL (Modified)    U Cocaine Metabolite Scrn NEGATIVE ng/mL (Modified)    U Cocaine Metabolite medMATCH CONSISTENT (Modified)    U Codeine Scrn NEGATIVE ng/mL (Modified)    U Codeine medMATCH CONSISTENT (Modified)    U Hydrocodone medMATCH CONSISTENT (Modified)    U Hydrocodone Scrn NEGATIVE ng/mL (Modified)    U Hydromorphone medMATCH INCONSISTENT (Modified)    U Hydromorphone Scrn 232 ng/mL  HI (Modified)    U medMATCH Comments See comment (Modified)    U Methadone Scrn NEGATIVE ng/mL (Modified)    U Methadone Scrn medMATCH CONSISTENT (Modified)    U Morphine medMATCH INCONSISTENT (Modified)    U Morphine Scrn >07042 ng/mL (Modified)    U Norhydrocodone NEGATIVE ng/mL (Modified)    U Norhydrocodone medMatch CONSISTENT (Modified)    U NorOxycodone Scrn >82080 ng/mL (Modified)    U Noroxycodone medMatch CONSISTENT (Modified)    U Opiates Scrn POSITIVE ng/mL (Modified)    U Oxidants NEGATIVE mcg/mL (Modified)    U Oxycodone 4,252 ng/mL  HI (Modified)    U Oxycodone medMATCH CONSISTENT (Modified)    U Oxycodone Scrn POSITIVE ng/mL (Modified)    U Oxymorphone medMATCH CONSISTENT (Modified)    U Oxymorphone Scrn 2,389 ng/mL  HI (Modified)    U PCP Scrn NEGATIVE ng/mL (Modified)    U PCP Scrn medMATCH CONSISTENT (Modified)    U Drug Scrn Comment See comment (Modified)    U Ritalinic Acid Cutoff 100 ng/mL NEGATIVE ng/mL (Modified)   .       Impression and Plan   Foot pain  possibly gout but seems less likely based on labwork   treat with naproxen bid follow up if not improving.

## 2022-02-15 NOTE — TELEPHONE ENCOUNTER
PEABODYDAVID YUTerrie : 1975  2019 MRN: [id number]  PHONE NOTE: The patient was given the results of his brain MRI done on 2019 at the [facility].  The scan was normal.  There was no mass lesion, no hemorrhage, and no infarct.  Normal brain parenchyma.    P: Patient will follow up if symptoms recur.     D:  2019  T:  2019 Floyd Baker MD/sq

## 2022-02-15 NOTE — TELEPHONE ENCOUNTER
---------------------  From: Angelita Shields   To: Crissy Kim CMA;     Sent: 3/18/2020 9:01:23 AM CDT  Subject: General Message       Needs testosterone meds re-filled

## 2022-02-15 NOTE — PROGRESS NOTES
Patient:   PEABODY, TIMOTHY J            MRN: [id number]            FIN: [id number]               Age:   43 years     Sex:  Male     :  1975   Associated Diagnoses:   Chronic pain; Chronic Lumbar Pain   Author:   Floyd Christensen PA-C      Chief Complaint   2018 12:56 PM CST   Pt here for pain management visit for refill of oxycodone      History of Present Illness   Patient presents today in follow up of their:  _low back pain  back pain is intermittent with good days and bad days, some radiation into legs with some weakness  no problems with control of urination or BMs    He has been working with a pain doctor in [place] and is trying to reduce his use of oxycodone    Has the patients condition changed significantly since their last visit?  no--he has had 3 back surgeries. He is using gabapentin and two types of physicaal therapy, including water therapy, it is helping somewhat   _  Has the patient been compliant with treatments, including non-opioid treatments? y   _  Has there been any aberrant opioid behavior since the last visit? n    _  Is the total opioid dose less than 90 morphine equivalents? yes, 60 MME   _  Is there reasonable progress toward meeting established functional goals? y    _  Was the last drug test consistent with prescribed medications?     _  Is another  drug test due at this visit? will allow to do with primary visit    _  Has the ePDMP been reviewed? yes    _   Is the patient complying with their signed Controlled Substance Abuse Agreement?  y, is seeing every other month   _  Based on all the above, is the patient still a candidate for chronic opioid therapy?  y   _           Review of Systems   Constitutional:  No fatigue, No decreased activity.    Respiratory:  Negative.    Gastrointestinal:  No nausea, No vomiting, No constipation.    Integumentary:  No pruritus.    Neurologic:  No abnormal balance, No confusion.    Psychiatric:  No anxiety, No depression, No memory  difficulties, No sleeping problems.       Health Status   Allergies:    Nonallergic Reactions (Selected)  Severe  Doxycycline (Vomiting)   Medications:  (Selected)   Prescriptions  Prescribed  DULoxetine 60 mg oral delayed release capsule: = 2 cap(s) ( 120 mg ), po, daily, # 180 cap(s), 1 Refill(s), Type: Maintenance, Pharmacy: [facility], 2 cap(s) Oral daily  SUMAtriptan 100 mg oral tablet: = 1 tab(s) ( 100 mg ), PO, Once, PRN: for migraine headache, # 9 tab(s), 5 Refill(s), Type: Soft Stop, Pharmacy: [facility], 1 tab(s) Oral once,PRN:for migraine headache  amitriptyline 25 mg oral tablet: = 1 tab(s) ( 25 mg ), PO, hs, # 30 tab(s), 0 Refill(s), Type: Maintenance, patient has supply at home (Rx)  gabapentin 300 mg oral capsule: = 2 cap(s) ( 600 mg ), PO, TID, # 180 cap(s), 1 Refill(s), Type: Maintenance, Pharmacy: [facility], 2 cap(s) Oral tid  oxyCODONE 10 mg oral tablet: = 1 tab(s) ( 10 mg ), po, qid, # 120 tab(s), 0 Refill(s), Type: Maintenance  raNITIdine 150 mg oral capsule: = 1 cap(s) ( 150 mg ), PO, daily, # 30 cap(s), 0 Refill(s), Type: Maintenance, Pharmacy: [facility], 1 cap(s) Oral daily   Problem list:    All Problems  Chronic pain / SNOMED CT 878488087 / Confirmed  Moderate major depression / SNOMED CT 5784844 / Confirmed  Onychomycosis / SNOMED CT 4887291787 / Confirmed  Obesity / ICD-9-.00 / Probable  Mild Episode of Depression / ICD-9-.31 / Confirmed  Migraine Headache / ICD-9-.90 / Confirmed  Morbid obesity / SNOMED CT 107407866 / Confirmed  PUD (Peptic Ulcer Disease) / ICD-9-.90 / Confirmed  Chronic Lumbar Pain / ICD-9-.2 / Confirmed  Insomnia / ICD-9-.52 / Confirmed  Cartilage tear / ICD-9-.9 / Confirmed  Resolved: *Hospitalized@[facility] - Pneumonia  Resolved: Tobacco user / ICD-9-.1      Histories   Past Medical History:    Active  PUD (Peptic Ulcer Disease) (533.90)  Chronic Lumbar Pain (724.2)  Onychomycosis  (3734690951)  Mild Episode of Depression (296.31)  Obesity (278.00)  Cartilage tear (848.9)  Migraine Headache (346.90)  Insomnia (780.52)  Resolved  *Hospitalized@[facility] - Pneumonia: Onset on 2/14/2015 at 39 years.  Resolved on 2/19/2015 at 39 years.  Tobacco user (305.1):  Resolved.   Family History:       Procedure history:    Spinal fusion ([id number]) on 8/22/2017 at 41 Years.  Comments:  8/31/2017 8:02 AM [facility] - Ashley Amaro  L4-5.  Fusion L5-S1 in 2015 at 40 Years.  Comments:  2/3/2015 8:49 AM Floyd Raines MD  02/05/2015  [facility]  Dr. Rios  Bilateral L5-S1 Decompression Foramina in 2014 at 39 Years.  Comments:  4/29/2014 3:20 PM Floyd Mayo MD, Dr.  [facility]  05/06/2014    Vasectomy (81267035) on 7/26/2012 at 36 Years.  Arthroscopy (59711893) in 2011 at 36 Years.  Comments:  7/11/2012 8:59 AM Miracle White  Bilateral knee  Polysomnogram (352078860) on 8/14/2009 at 33 Years.  Comments:  7/11/2012 9:09 AM Miracle White  No significant sleep-disorder findings.  Bruxism.  Follow-up indicated.  Tonsillectomy (819855879) in 1980 at 5 Years.   Social History:        Alcohol Assessment: Denies Alcohol Use            Never      Tobacco Assessment: Past            Past, Cigarettes, 4 per day.                     Comments:                      04/29/2014 - Floyd Baker MD                     Quit 04/01/2014      Substance Abuse Assessment            Never      Employment and Education Assessment            Employed                     Comments:                      04/29/2014 - Floyd Baker MD                     [employer]      Home and Environment Assessment            Marital status:  (Living together).  Lives with Self, Children, Spouse.  Living situation:               Home/Independent.      Exercise and Physical Activity Assessment: Does not exercise            Exercise frequency: No regular exercise..      Physical Examination    Vital Signs   12/6/2018 12:56 PM CST Peripheral Pulse Rate 111 bpm  HI    Systolic Blood Pressure 128 mmHg    Diastolic Blood Pressure 84 mmHg  HI    Mean Arterial Pressure 99 mmHg    BP Site Right arm    Oxygen Saturation 96 %      General:  No acute distress.    Respiratory:  Lungs are clear to auscultation.    Cardiovascular:  Normal rate, Regular rhythm, No murmur.    Musculoskeletal:  good strength with heel and toe raise.  Patella and achilles DTRs symmetrical.  Good strength with resisted plantar  flexion. Some weakness in left foot with resisted dorsi flexion. Good strength with resisted flexion and extension of lower legs. .    Psychiatric:  Appropriate mood & affect.       Impression and Plan   Diagnosis     Chronic pain (DDO42-OB G89.29).     Chronic Lumbar Pain (PAC24-VU M54.5).     Course:  Unchanged.    Plan:  Continue with current medication(s), dose and treatment plan.  Refill(s) have been sent electronically to the pharmacy.   Goals, benefits, side effects, and risks of chronic narcotic therapy reviewed and discussed.  Patient verbalized understanding and is in agreement to plan.    Patient instructed to follow up in:  _  weeks    _ 1 mos     _x 2 mos     _ 3 mos     _ 6 mos.    Orders     Orders   Pharmacy:  oxyCODONE 10 mg oral tablet (Prescribe): = 1 tab(s) ( 10 mg ), po, qid, # 120 tab(s), 0 Refill(s), Type: Maintenance, Pharmacy: [facility], 1 tab(s) Oral qid  oxyCODONE 10 mg oral tablet (Modify): = 1 tab(s) ( 10 mg ), po, qid, # 120 tab(s), 0 Refill(s), Type: Hard Stop.     Orders   Charges (Evaluation and Management):  04060 office outpatient visit 15 minutes (Charge) (Order): Quantity: 1, Chronic Lumbar Pain  Chronic pain.     Orders (Selected)   Outpatient Orders  Ordered  Return to Clinic (Request): RFV: Pain Management, Return in 2 months  Return to Clinic (Request): RFV: med ck, Return in 6 months/ Mar  2019  Prescriptions  Prescribed  DULoxetine 60 mg oral delayed release  capsule: = 2 cap(s) ( 120 mg ), po, daily, # 180 cap(s), 1 Refill(s), Type: Maintenance, Pharmacy: [facility], 2 cap(s) Oral daily  SUMAtriptan 100 mg oral tablet: = 1 tab(s) ( 100 mg ), PO, Once, PRN: for migraine headache, # 9 tab(s), 5 Refill(s), Type: Soft Stop, Pharmacy: [facility], 1 tab(s) Oral once,PRN:for migraine headache  amitriptyline 25 mg oral tablet: = 1 tab(s) ( 25 mg ), PO, hs, # 30 tab(s), 0 Refill(s), Type: Maintenance, patient has supply at home (Rx)  gabapentin 300 mg oral capsule: = 2 cap(s) ( 600 mg ), PO, TID, # 180 cap(s), 1 Refill(s), Type: Maintenance, Pharmacy: [facility], 2 cap(s) Oral tid  oxyCODONE 10 mg oral tablet: = 1 tab(s) ( 10 mg ), po, qid, # 120 tab(s), 0 Refill(s), Type: Maintenance  raNITIdine 150 mg oral capsule: = 1 cap(s) ( 150 mg ), PO, daily, # 30 cap(s), 0 Refill(s), Type: Maintenance, Pharmacy: [facility], 1 cap(s) Oral daily.

## 2022-02-15 NOTE — NURSING NOTE
Diabetes Eye Testing Entered On:  1/28/2020 8:14 AM CST    Performed On:  1/27/2020 8:14 AM CST by Crissy Kim CMA               Diabetes Eye Testing   Retinopathy Present TR :   No   Crissy Kim CMA - 1/28/2020 8:14 AM CST

## 2022-02-15 NOTE — PROGRESS NOTES
Patient:   PEABODY, TIMOTHY J            MRN: [id number]            FIN: [id number]               Age:   41 years     Sex:  Male     :  1975   Associated Diagnoses:   Chronic Lumbar Pain   Author:   Floyd Baker MD      Impression and Plan   Diagnosis     Chronic Lumbar Pain (EQN79-BW M54.5).     Course:  Improving.    Orders     Orders   Charges (Evaluation and Management):  37991 office outpatient visit 15 minutes (Charge) (Order): Quantity: 1, Chronic Lumbar Pain.     Orders (Selected)   Prescriptions  Prescribed  OxyCONTIN 10 mg oral tablet, extended release: 1 tab(s) ( 10 mg ), PO, q12hr, # 60 tab(s), 0 Refill(s), Type: Maintenance  oxyCODONE 10 mg oral tablet: 1 tab(s) ( 10 mg ), po, q4-6 hrs, # 90 tab(s), 0 Refill(s), Type: Maintenance.        Visit Information      Date of Service: 2017 02:21 pm  Performing Location: [facility]  Encounter#: [id number]      Primary Care Provider (PCP):  Floyd Baker MD    NPI# [id number]      Referring Provider:  Floyd Baker MD, NPI# [id number]   Visit type:  Scheduled follow-up.    Accompanied by   Source of history:  Self.    Referral source:  Self.    History limitation:  None.       Chief Complaint   Chief complaint discussed and confirmed correct.     2017 2:39 PM CDT     Pt here for yellow odorous drainage from incision        History of Present Illness             The patient presents with Patient had anterior/Posterior Lumbar fusion.  Here for post op check.  Pain controlled on Oxycodone and Oxycontin.  Appetite fine.  No bowel problems. No urine problems.    Some serosanguinous discharge from the abdominal wound which has now become malodorous..        Review of Systems   Constitutional:  Negative.    Eye:  Negative.    Ear/Nose/Mouth/Throat:  Negative.    Respiratory:  Negative.    Cardiovascular:  Negative.    Gastrointestinal:  Negative.    Genitourinary:  Negative.    Hematology/Lymphatics:  Negative.    Endocrine:   Negative.    Immunologic:  Negative.    Musculoskeletal:  Negative.    Integumentary:  Negative except as documented in history of present illness.    Neurologic:  Negative.    Psychiatric:  Negative.           All other systems reviewed and negative      Health Status   Allergies:    Nonallergic Reactions (Selected)  Severe  Doxycycline (Vomiting)   Medications:  (Selected)   Prescriptions  Prescribed  Augmentin 875 mg-125 mg oral tablet: 1 tab(s), po, bidac, # 20 tab(s), 0 Refill(s), Type: Maintenance  DULoxetine 60 mg oral delayed release capsule: 1 cap(s) ( 60 mg ), po, daily, # 30 cap(s), 5 Refill(s), Type: Maintenance, Pharmacy: [facility], 1 cap(s) po daily  OxyCONTIN 10 mg oral tablet, extended release: 1 tab(s) ( 10 mg ), PO, q12hr, # 60 tab(s), 0 Refill(s), Type: Maintenance  SUMAtriptan 100 mg oral tablet: 1 tab(s) ( 100 mg ), PO, Once, PRN: for migraine headache, # 9 tab(s), 1 Refill(s), Type: Soft Stop, Pharmacy: [facility], 1 tab(s) po once,PRN:for migraine headache  cyclobenzaprine 10 mg oral tablet: 1 tab(s) ( 10 mg ), PO, q6 hrs, PRN: for spasm, # 50 tab(s), 1 Refill(s), Type: Maintenance, Pharmacy: [facility], 1 tab(s) po q6 hrs,PRN:for spasm  oxyCODONE 10 mg oral tablet: 1 tab(s) ( 10 mg ), po, q4-6 hrs, # 90 tab(s), 0 Refill(s), Type: Maintenance  traZODone 100 mg oral tablet: 1 tab(s) ( 100 mg ), PO, hs, # 30 tab(s), 5 Refill(s), Type: Maintenance, Pharmacy: [facility], 1 tab(s) po hs   Problem list:    All Problems  Cartilage tear / ICD-9-.9 / Confirmed  Chronic Lumbar Pain / ICD-9-.2 / Confirmed  Insomnia / ICD-9-.52 / Confirmed  Migraine Headache / ICD-9-.90 / Confirmed  Mild Episode of Depression / ICD-9-.31 / Confirmed  Moderate major depression / SNOMED CT 9856908 / Confirmed  Obesity / ICD-9-.00 / Probable  Onychomycosis / SNOMED CT 4739747086 / Confirmed  PUD (Peptic Ulcer Disease) / ICD-9-.90 /  Confirmed  Resolved: *Hospitalized@[facility] - Pneumonia  Resolved: Tobacco user / ICD-9-.1      Histories   Past Medical History:    Active  PUD (Peptic Ulcer Disease) (533.90)  Chronic Lumbar Pain (724.2)  Onychomycosis (9014064496)  Mild Episode of Depression (296.31)  Obesity (278.00)  Cartilage tear (848.9)  Migraine Headache (346.90)  Insomnia (780.52)  Resolved  *Hospitalized@[facility] - Pneumonia: Onset on 2/14/2015 at 39 years.  Resolved on 2/19/2015 at 39 years.  Tobacco user (305.1):  Resolved.   Family History:       Procedure history:    Spinal fusion (SNOMED CT 44797739) performed by Freddy Duvall MD on 8/22/2017 at 41 Years.  Comments:  8/31/2017 8:02 AM - Ashley Amaro  L4-5.  Fusion L5-S1 in 2015 at 40 Years.  Comments:  2/3/2015 8:49 AM - Floyd Baker MD  02/05/2015  [facility]  Dr. Rios  Bilateral L5-S1 Decompression Foramina in 2014 at 39 Years.  Comments:  4/29/2014 3:20 PM - Floyd Baker MD, Dr.  [facility]  05/06/2014    Vasectomy (SNOMED CT 48360342) on 7/26/2012 at 36 Years.  Arthroscopy (SNOMED CT 94279203) in 2011 at 36 Years.  Comments:  7/11/2012 8:59 AM - Miracle Griffiths  Bilateral knee  Polysomnogram (SNOMED CT 705280538) on 8/14/2009 at 33 Years.  Comments:  7/11/2012 9:09 AM - Miracle Griffiths  No significant sleep-disorder findings.  Bruxism.  Follow-up indicated.  Tonsillectomy (SNOMED CT 746517212) in 1980 at 5 Years.   Social History:        Alcohol Assessment: Denies Alcohol Use            Never      Tobacco Assessment: Past            Past, Cigarettes, 4 per day.                     Comments:                      04/29/2014 - Floyd Baker MD                     Quit 04/01/2014      Substance Abuse Assessment            Never      Employment and Education Assessment            Employed                     Comments:                      04/29/2014 - Floyd Baker MD                     [employer]      Home and Environment Assessment             Marital status:  (Living together).  Lives with Self, Children, Spouse.  Living situation:               Home/Independent.      Exercise and Physical Activity Assessment: Does not exercise            Exercise frequency: No regular exercise..        Physical Examination   Vital Signs   9/5/2017 2:39 PM CDT Temperature Tympanic 98.1 DegF    Peripheral Pulse Rate 110 bpm  HI    Oxygen Saturation 97 %      Measurements from flowsheet : Measurements   9/5/2017 2:39 PM CDT Height Measured - Standard 70 in    Weight Measured - Standard 278 lb    BSA 2.49 m2    Body Mass Index 39.88 kg/m2      General:  No acute distress.    Neck:  Supple, No lymphadenopathy, No thyromegaly.    Respiratory:  Lungs are clear to auscultation, Respirations are non-labored, Breath sounds are equal, Symmetrical chest wall expansion.    Cardiovascular:  Normal rate, Regular rhythm, No murmur, No gallop, Good pulses equal in all extremities, Normal peripheral perfusion, No edema.    Gastrointestinal:  Soft, Non-tender, Non-distended, Normal bowel sounds, No organomegaly.    Integumentary:  Warm, Dry, Pink, Surgical wounds without erythema or dehiscense.  small amount of yellow discharge which is malodorous., steri strips removed and wound redressed.    Neurologic:  Alert, Oriented.    Psychiatric:  Cooperative, Appropriate mood & affect.       Health Maintenance      Recommendations     Pending (in the next year)        OverDue           Influenza Vaccine due  02/16/16  and every 1  year(s)        Due            Lipid Disorders Screen (Male) due  09/05/17  and every 1  year(s)           Type 2 Diabetes Mellitus Screen (Male) due  09/05/17  Variable frequency        Due In Future            Body Mass Index Check (Male) not due until  05/19/18  and every 1  year(s)           High Blood Pressure Screen (Male) not due until  06/28/18  and every 1  year(s)           Tobacco Use Screen (Male) not due until  06/28/18  and every 1  year(s)            Depression Screen (Male) not due until  06/28/18  and every 1  year(s)     Satisfied (in the past 1 year)        Satisfied            Alcohol Misuse Screen (Male) on  06/28/17.           Body Mass Index Check (Male) on  05/19/17.           Body Mass Index Check (Male) on  02/14/17.           Depression Screen (Male) on  06/28/17.           Depression Screen (Male) on  06/28/17.           Depression Screen (Male) on  06/28/17.           High Blood Pressure Screen (Male) on  06/28/17.           High Blood Pressure Screen (Male) on  05/19/17.           High Blood Pressure Screen (Male) on  05/08/17.           High Blood Pressure Screen (Male) on  02/14/17.           Obesity Screen and Counseling (Male) on  06/28/17.           Obesity Screen and Counseling (Male) on  05/19/17.           Obesity Screen and Counseling (Male) on  05/08/17.           Obesity Screen and Counseling (Male) on  02/14/17.           Tobacco Use Screen (Male) on  06/28/17.           Tobacco Use Screen (Male) on  05/19/17.           Tobacco Use Screen (Male) on  05/08/17.           Tobacco Use Screen (Male) on  02/14/17.

## 2022-02-15 NOTE — TELEPHONE ENCOUNTER
---------------------  From: Angelita Shields   To: Crissy Kim CMA;     Sent: 6/16/2020 11:14:38 AM CDT  Subject: General Message     med refill - oxy - also having back pain - has had in the past - wondering if he can get something or willing to make apt if needed.---------------------  From: Crissy Kim CMA   To: Floyd Baker MD;     Sent: 6/16/2020 11:19:03 AM CDT  Subject: FW: General Message  ** Submitted: **  Order:oxyCODONE (oxyCODONE 10 mg oral tablet)  1 tab(s)  Oral  qid  Qty:  120 EA        Refills:  0          Route To Pharmacy - [facility]    Signed by Floyd Baker MD  6/17/2020 2:33:00 PM

## 2022-02-15 NOTE — PROGRESS NOTES
Patient:   PEABODY, TIMOTHY J            MRN: [id number]            FIN: [id number]               Age:   41 years     Sex:  Male     :  1975   Associated Diagnoses:   Chronic Lumbar Pain   Author:   Floyd Baker MD      Impression and Plan   Diagnosis     Chronic Lumbar Pain ([id number] M54.5).     Condition:  Stable, no contraindication for general anesthesia or surgical procedure..    Orders     Orders   Charges (Evaluation and Management):  35218 office outpatient visit 25 minutes (Charge) (Order): Quantity: 1, Chronic Lumbar Pain.     Orders (Selected)   Outpatient Orders  Completed   ekg interpret + report preve (Charge): Quantity: 1, Chronic Lumbar Pain.        Preoperative Information   Indication for surgery:  Musculoskeletal disorder.         Associated symptoms: Degenerative Lumbar Disease with instability at L4-5.  Intractable lumbar pain not improving with conservative treatments.    Accompanied by:  No one.    Source of history:  Self.    History limitation:  None.       Chief Complaint   Chief complaint discussed and confirmed correct.     2017 8:11 AM CDT     Chief Complaint        Review of Systems   Constitutional:  Negative.    Eye:  Negative.    Ear/Nose/Mouth/Throat:  Negative.    Respiratory:  Negative.    Cardiovascular:  Negative.    Gastrointestinal:  Negative.    Genitourinary:  Negative.    Hematology/Lymphatics:  Negative.    Endocrine:  Negative.    Immunologic:  Negative.    Musculoskeletal:  Back pain.    Integumentary:  Negative.    Neurologic:  Negative.    Psychiatric:  Negative.    All other systems reviewed and negative   No personal or family history of anesthesia reactions  No history of bleeding or blood clotting disorders  No history of heart murmur or need for prophylactic antibiotics  No history of blood transfusion  No history of recent infectious contacts       Health Status   Allergies:    Nonallergic Reactions (Selected)  Severe  Doxycycline (Vomiting)    Medications:  (Selected)   Prescriptions  Prescribed  DULoxetine 60 mg oral delayed release capsule: 1 cap(s) ( 60 mg ), po, daily, # 30 cap(s), 5 Refill(s), Type: Maintenance, Pharmacy: [facility], 1 cap(s) po daily  SUMAtriptan 100 mg oral tablet: 1 tab(s) ( 100 mg ), PO, Once, PRN: for migraine headache, # 9 tab(s), 1 Refill(s), Type: Soft Stop, Pharmacy: [facility], 1 tab(s) po once,PRN:for migraine headache  cyclobenzaprine 10 mg oral tablet: 1 tab(s) ( 10 mg ), PO, q6 hrs, PRN: for spasm, # 50 tab(s), 1 Refill(s), Type: Maintenance, Pharmacy: [facility], 1 tab(s) po q6 hrs,PRN:for spasm  oxyCODONE 10 mg oral tablet: 1 tab(s) ( 10 mg ), po, qid, # 120 tab(s), 0 Refill(s), Type: Maintenance  traZODone 100 mg oral tablet: 1 tab(s) ( 100 mg ), PO, hs, # 30 tab(s), 5 Refill(s), Type: Maintenance, Pharmacy: [facility], 1 tab(s) po hs   Problem list:    All Problems  Cartilage tear / ICD-9-.9 / Confirmed  Chronic Lumbar Pain / ICD-9-.2 / Confirmed  Insomnia / ICD-9-.52 / Confirmed  Migraine Headache / ICD-9-.90 / Confirmed  Mild Episode of Depression / ICD-9-.31 / Confirmed  Moderate major depression / SNOMED CT 9118689 / Confirmed  Obesity / ICD-9-.00 / Probable  Onychomycosis / SNOMED CT 1938906451 / Confirmed  PUD (Peptic Ulcer Disease) / ICD-9-.90 / Confirmed  Resolved: *Hospitalized@[facility] - Pneumonia  Resolved: Tobacco user / ICD-9-.1      Histories   Past Medical History:    Active  PUD (Peptic Ulcer Disease) (533.90)  Chronic Lumbar Pain (724.2)  Onychomycosis (4693099417)  Mild Episode of Depression (296.31)  Obesity (278.00)  Cartilage tear (848.9)  Migraine Headache (346.90)  Insomnia (780.52)  Resolved  *Hospitalized@[facility] - Pneumonia: Onset on 2/14/2015 at 39 years.  Resolved on 2/19/2015 at 39 years.  Tobacco user (305.1):  Resolved.   Family History:       Procedure history:    Fusion L5-S1 in 2015 at 40  Years.  Comments:  2/3/2015 8:49 AM - Floyd Baker MD  02/05/2015  [facility]  Dr. Rios  Bilateral L5-S1 Decompression Foramina in 2014 at 39 Years.  Comments:  4/29/2014 3:20 PM - Floyd Baker MD, Dr.  [facility]  05/06/2014    Vasectomy (SNOMED CT 72506562) on 7/26/2012 at 36 Years.  Arthroscopy (SNOMED CT 87773834) in 2011 at 36 Years.  Comments:  7/11/2012 8:59 AM - Miracle Griffiths  Bilateral knee  Polysomnogram (SNOMED CT 331312307) on 8/14/2009 at 33 Years.  Comments:  7/11/2012 9:09 AM - Miracle Griffiths  No significant sleep-disorder findings.  Bruxism.  Follow-up indicated.  Tonsillectomy (SNOMED CT 146284634) in 1980 at 5 Years.   Social History:        Alcohol Assessment: Denies Alcohol Use            Never      Tobacco Assessment: Past            Past, Cigarettes, 4 per day.                     Comments:                      04/29/2014 - Floyd Baker MD                     Quit 04/01/2014      Substance Abuse Assessment            Never      Employment and Education Assessment            Employed                     Comments:                      04/29/2014 - Floyd Baker MD                     [employer]      Home and Environment Assessment            Marital status:  (Living together).  Lives with Self, Children, Spouse.  Living situation:               Home/Independent.      Exercise and Physical Activity Assessment: Does not exercise            Exercise frequency: No regular exercise..        Physical Examination   Vital signs reviewed  and within acceptable limits    Vital Signs   8/2/2017 8:11 AM CDT Temperature Tympanic 98.5 DegF    Peripheral Pulse Rate 72 bpm    HR Method Manual    Systolic Blood Pressure 120 mmHg    Diastolic Blood Pressure 66 mmHg    Mean Arterial Pressure 84 mmHg    BP Site Left arm    BP Method Manual      Measurements from flowsheet : Measurements   8/2/2017 8:11 AM CDT Height Measured - Standard 70 in    Weight Measured -  Standard 278.4 lb    BSA 2.49 m2    Body Mass Index 39.94 kg/m2      General:  Alert and oriented, Moderate distress.    Eye:  Pupils are equal, round and reactive to light, Extraocular movements are intact, Normal conjunctiva.    HENT:  Normocephalic, Tympanic membranes are clear, Normal hearing, Oral mucosa is moist, No pharyngeal erythema.    Neck:  Supple, Non-tender, No carotid bruit, No jugular venous distention, No lymphadenopathy, No thyromegaly.    Respiratory:  Lungs are clear to auscultation, Respirations are non-labored, Breath sounds are equal, Symmetrical chest wall expansion, No chest wall tenderness.    Cardiovascular:  Normal rate, Regular rhythm, No murmur, No gallop, Good pulses equal in all extremities, Normal peripheral perfusion, No edema.    Gastrointestinal:  Soft, Non-tender, Non-distended, Normal bowel sounds, No organomegaly.    Lymphatics:  No lymphadenopathy neck, axilla, groin.    Musculoskeletal:  Normal strength, No swelling, severe lumbar pain.  Limping gait..    Integumentary:  Warm, Dry, Pink.    Neurologic:  Normal sensory, Normal motor function, No focal deficits, Normal deep tendon reflexes.    Psychiatric:  Cooperative, Appropriate mood & affect, Normal judgment.       Review / Management   Results review:  Lab results   5/19/2017 8:57 AM CDT Sodium Level 141 mmol/L     Potassium Level 4.7 mmol/L     Chloride Level 106 mmol/L     CO2 Level 26 mmol/L     AGAP 9     Glucose Level 104 mg/dL     BUN 12 mg/dL     Creatinine 0.93 mg/dL     BUN/Creat Ratio 13     eGFR  >60     eGFR Non-African American >60     Calcium Level 9.3 mg/dL     Uric Acid 4.1 mg/dL     WBC 5.9     RBC 5.08     Hgb 15.1 g/dL     Hct 45.5 %     MCV 90 fL     MCH 29.7 pg     MCHC 33.2 g/dL     RDW 12.0 %     Platelet 284     MPV 9.1 fL    5/8/2017 4:31 PM CDT U pH 6.41 (Modified)    U Creatinine 241.2 mg/dL (Modified)    Pain Management Prescribed Drug 1 Oxycodone (Modified)    U Amphetamines  NEGATIVE ng/mL (Modified)    U Amphetamines medMATCH CONSISTENT (Modified)    U Andie Scrn NEGATIVE ng/mL (Modified)    U Andie medMATCH CONSISTENT (Modified)    U Benzodia Scrn NEGATIVE ng/mL (Modified)    U Benzodia medMATCH CONSISTENT (Modified)    U Cannabis Metabolite medMATCH CONSISTENT (Modified)    U Cannabis Metabolite Scrn NEGATIVE ng/mL (Modified)    U Cocaine Metabolite Scrn NEGATIVE ng/mL (Modified)    U Cocaine Metabolite medMATCH CONSISTENT (Modified)    U Codeine Scrn NEGATIVE ng/mL (Modified)    U Codeine medMATCH CONSISTENT (Modified)    U Hydrocodone medMATCH CONSISTENT (Modified)    U Hydrocodone Scrn NEGATIVE ng/mL (Modified)    U Hydromorphone medMATCH INCONSISTENT (Modified)    U Hydromorphone Scrn 232 ng/mL  HI (Modified)    U medMATCH Comments See comment (Modified)    U Methadone Scrn NEGATIVE ng/mL (Modified)    U Methadone Scrn medMATCH CONSISTENT (Modified)    U Morphine medMATCH INCONSISTENT (Modified)    U Morphine Scrn >91735 ng/mL (Modified)    U Norhydrocodone NEGATIVE ng/mL (Modified)    U Norhydrocodone medMatch CONSISTENT (Modified)    U NorOxycodone Scrn >30888 ng/mL (Modified)    U Noroxycodone medMatch CONSISTENT (Modified)    U Opiates Scrn POSITIVE ng/mL (Modified)    U Oxidants NEGATIVE mcg/mL (Modified)    U Oxycodone 4,252 ng/mL  HI (Modified)    U Oxycodone medMATCH CONSISTENT (Modified)    U Oxycodone Scrn POSITIVE ng/mL (Modified)    U Oxymorphone medMATCH CONSISTENT (Modified)    U Oxymorphone Scrn 2,389 ng/mL  HI (Modified)    U PCP Scrn NEGATIVE ng/mL (Modified)    U PCP Scrn medMATCH CONSISTENT (Modified)    U Drug Scrn Comment See comment (Modified)    U Ritalinic Acid Cutoff 100 ng/mL NEGATIVE ng/mL (Modified)   .

## 2022-02-15 NOTE — PROGRESS NOTES
Patient:   PEABODY, TIMOTHY J            MRN: [id number]            FIN: [id number]               Age:   41 years     Sex:  Male     :  1975   Associated Diagnoses:   Surgical wound infection   Author:   Floyd Baker MD      Impression and Plan   Diagnosis     Surgical wound infection ([id number] T81.4XXD).     Course:  Unchanged.    Plan:    1. Wound Culture  2. Trial of Trimeth/Sulfa  3. CT of ABD tomorrow.    Orders     Orders   Charges (Evaluation and Management):  82332 office outpatient visit 25 minutes (Charge) (Order): Quantity: 1, Surgical wound infection.       Orders (Selected)   Outpatient Orders  Ordered  CT Abdomen and Pelvis w/contrast (Request): Surgical wound infection  Order  Culture Wound (Request): Surgical wound infection  Prescriptions  Prescribed  Septra  mg-160 mg oral tablet: 1 tab(s), PO, BID, # 20 tab(s), 0 Refill(s), Type: Maintenance, Pharmacy: [facility], 1 tab(s) po bid,x10 day(s).          Visit Information   Visit type:  New symptom.    Accompanied by:  No one.    Source of history:  Self.    History limitation:  None.       Chief Complaint   Chief complaint discussed and confirmed correct.     2017 1:04 PM CDT    Pt here to recheck incision      History of Present Illness             The patient presents with Has had surgical wound infection in the umbilicus.  Improved a little with Augmentin for 10 days but did not resolve.  Dressings have consistent purulent discharge.  No high fevers but often feels warm..        Review of Systems   Constitutional:  Negative.    Eye:  Negative.    Ear/Nose/Mouth/Throat:  Negative.    Respiratory:  Negative.    Cardiovascular:  Negative.    Gastrointestinal:  Negative.    Genitourinary:  Negative.    Hematology/Lymphatics:  Negative.    Endocrine:  Negative.    Immunologic:  Negative.    Musculoskeletal:  Negative.    Integumentary:  Negative.    Neurologic:  Negative.    Psychiatric:  Negative.          All other systems  reviewed and negative      Health Status   Allergies:    Nonallergic Reactions (Selected)  Severe  Doxycycline (Vomiting)   Medications:  (Selected)   Prescriptions  Prescribed  DULoxetine 60 mg oral delayed release capsule: 1 cap(s) ( 60 mg ), po, daily, # 30 cap(s), 5 Refill(s), Type: Maintenance, Pharmacy: [facility], 1 cap(s) po daily  OxyCONTIN 10 mg oral tablet, extended release: 1 tab(s) ( 10 mg ), PO, q12hr, # 60 tab(s), 0 Refill(s), Type: Maintenance  SUMAtriptan 100 mg oral tablet: 1 tab(s) ( 100 mg ), PO, Once, PRN: for migraine headache, # 9 tab(s), 1 Refill(s), Type: Soft Stop, Pharmacy: [facility], 1 tab(s) po once,PRN:for migraine headache  Septra  mg-160 mg oral tablet: 1 tab(s), PO, BID, # 20 tab(s), 0 Refill(s), Type: Maintenance, Pharmacy: [facility], 1 tab(s) po bid,x10 day(s)  cyclobenzaprine 10 mg oral tablet: 1 tab(s) ( 10 mg ), PO, q6 hrs, PRN: for spasm, # 50 tab(s), 1 Refill(s), Type: Maintenance, Pharmacy: [facility], 1 tab(s) po q6 hrs,PRN:for spasm  oxyCODONE 10 mg oral tablet: 1 tab(s) ( 10 mg ), po, q4-6 hrs, # 90 tab(s), 0 Refill(s), Type: Maintenance  traZODone 100 mg oral tablet: 1 tab(s) ( 100 mg ), PO, hs, # 30 tab(s), 5 Refill(s), Type: Maintenance, Pharmacy: [facility], 1 tab(s) po hs   Problem list:    All Problems  Cartilage tear / ICD-9-.9 / Confirmed  Chronic Lumbar Pain / ICD-9-.2 / Confirmed  Insomnia / ICD-9-.52 / Confirmed  Migraine Headache / ICD-9-.90 / Confirmed  Mild Episode of Depression / ICD-9-.31 / Confirmed  Moderate major depression / SNOMED CT 7785310 / Confirmed  Obesity / ICD-9-.00 / Probable  Onychomycosis / SNOMED CT 8888608121 / Confirmed  PUD (Peptic Ulcer Disease) / ICD-9-.90 / Confirmed  Resolved: *Hospitalized@[facility] - Pneumonia  Resolved: Tobacco user / ICD-9-.1      Histories   Past Medical History:    Active  PUD (Peptic Ulcer Disease) (533.90)  Chronic  Lumbar Pain (724.2)  Onychomycosis (4221770613)  Mild Episode of Depression (296.31)  Obesity (278.00)  Cartilage tear (848.9)  Migraine Headache (346.90)  Insomnia (780.52)  Resolved  *Hospitalized@[facility] - Pneumonia: Onset on 2/14/2015 at 39 years.  Resolved on 2/19/2015 at 39 years.  Tobacco user (305.1):  Resolved.   Family History:       Procedure history:    Spinal fusion (SNOMED CT 16925060) performed by Freddy Duvall MD on 8/22/2017 at 41 Years.  Comments:  8/31/2017 8:02 AM - Ashley Amaro  L4-5.  Fusion L5-S1 in 2015 at 40 Years.  Comments:  2/3/2015 8:49 AM - Floyd Baker MD  02/05/2015  [facility]  Dr. Rios  Bilateral L5-S1 Decompression Foramina in 2014 at 39 Years.  Comments:  4/29/2014 3:20 PM - Floyd Baker MD, Dr.  [facility]  05/06/2014    Vasectomy (SNOMED CT 79489691) on 7/26/2012 at 36 Years.  Arthroscopy (SNOMED CT 26627874) in 2011 at 36 Years.  Comments:  7/11/2012 8:59 AM - Miracle Griffiths  Bilateral knee  Polysomnogram (SNOMED CT 996369575) on 8/14/2009 at 33 Years.  Comments:  7/11/2012 9:09 AM - Miracle Griffiths  No significant sleep-disorder findings.  Bruxism.  Follow-up indicated.  Tonsillectomy (SNOMED CT 816315075) in 1980 at 5 Years.   Social History:        Alcohol Assessment: Denies Alcohol Use            Never      Tobacco Assessment: Past            Past, Cigarettes, 4 per day.                     Comments:                      04/29/2014 - Floyd Baker MD                     Quit 04/01/2014      Substance Abuse Assessment            Never      Employment and Education Assessment            Employed                     Comments:                      04/29/2014 - Floyd Baker MD                     [employer]      Home and Environment Assessment            Marital status:  (Living together).  Lives with Self, Children, Spouse.  Living situation:               Home/Independent.      Exercise and Physical Activity Assessment: Does  not exercise            Exercise frequency: No regular exercise..        Physical Examination   Vital signs reviewed  and within acceptable limits    Vital Signs   9/19/2017 1:04 PM CDT Temperature Tympanic 97.6 DegF  LOW    Peripheral Pulse Rate 84 bpm    Pulse Site Radial artery    Systolic Blood Pressure 134 mmHg    Diastolic Blood Pressure 82 mmHg    Mean Arterial Pressure 99 mmHg    BP Site Left arm      Measurements from flowsheet : Measurements   9/19/2017 1:04 PM CDT Height Measured - Standard 70 in    Weight Measured - Standard 278 lb    BSA 2.49 m2    Body Mass Index 39.88 kg/m2      General:  No acute distress.    Neck:  Supple, No lymphadenopathy, No thyromegaly.    Respiratory:  Lungs are clear to auscultation, Respirations are non-labored.    Cardiovascular:  Normal rate, Regular rhythm, No murmur, Normal peripheral perfusion, No edema.    Gastrointestinal:  Soft, Non-tender, Non-distended, Normal bowel sounds, No organomegaly.    Musculoskeletal:  Normal gait.    Integumentary:  Warm, Dry, Pink, surgical wound well healed except in the umbilicus.  Tissue is raw and red.  Scant amount of purulent discharge..    Neurologic:  Alert, Oriented.    Psychiatric:  Cooperative, Appropriate mood & affect, Normal judgment.       Health Maintenance      Recommendations     Pending (in the next year)        OverDue           Influenza Vaccine due  02/16/16  and every 1  year(s)        Due            Lipid Disorders Screen (Male) due  09/19/17  and every 1  year(s)           Type 2 Diabetes Mellitus Screen (Male) due  09/19/17  Variable frequency        Due In Future            Depression Screen (Male) not due until  06/28/18  and every 1  year(s)     Satisfied (in the past 1 year)        Satisfied            Alcohol Misuse Screen (Male) on  06/28/17.           Body Mass Index Check (Male) on  09/19/17.           Body Mass Index Check (Male) on  09/05/17.           Body Mass Index Check (Male) on  08/02/17.            Body Mass Index Check (Male) on  05/19/17.           Body Mass Index Check (Male) on  02/14/17.           Depression Screen (Male) on  06/28/17.           Depression Screen (Male) on  06/28/17.           Depression Screen (Male) on  06/28/17.           High Blood Pressure Screen (Male) on  09/19/17.           High Blood Pressure Screen (Male) on  08/30/17.           High Blood Pressure Screen (Male) on  08/02/17.           High Blood Pressure Screen (Male) on  06/28/17.           High Blood Pressure Screen (Male) on  05/19/17.           High Blood Pressure Screen (Male) on  05/08/17.           High Blood Pressure Screen (Male) on  02/14/17.           Obesity Screen and Counseling (Male) on  09/19/17.           Obesity Screen and Counseling (Male) on  09/05/17.           Obesity Screen and Counseling (Male) on  08/30/17.           Obesity Screen and Counseling (Male) on  08/02/17.           Obesity Screen and Counseling (Male) on  06/28/17.           Obesity Screen and Counseling (Male) on  05/19/17.           Obesity Screen and Counseling (Male) on  05/08/17.           Obesity Screen and Counseling (Male) on  02/14/17.           Tobacco Use Screen (Male) on  09/19/17.           Tobacco Use Screen (Male) on  08/30/17.           Tobacco Use Screen (Male) on  08/02/17.           Tobacco Use Screen (Male) on  06/28/17.           Tobacco Use Screen (Male) on  05/19/17.           Tobacco Use Screen (Male) on  05/08/17.           Tobacco Use Screen (Male) on  02/14/17.

## 2022-02-15 NOTE — TELEPHONE ENCOUNTER
---------------------  From: Crissy Kim CMA   To: Floyd Baker MD;     Sent: 2020 10:49:04 AM CST  Subject: refill request-oxycodone     PCP:   LYDIA    Medication:   oxycodone  Last Filled:  20    Quantity:  _  Refills:  _      Date of last office visit and reason:          Note:  pt requesting refill    Pharmacy: [facility]    Resource:   [facility]  Phone:   [phone]  ** Submitted: **  Order:oxyCODONE (oxyCODONE 10 mg oral tablet)  1 tab(s)  po  q6 hrs  Qty:  120 tab(s)        Refills:  0          Substitutions Allowed     Route To Pharmacy - [facility]    Signed by Floyd Baker MD  2020 12:15:00 PM

## 2022-02-15 NOTE — LETTER
(Inserted Image. Unable to display)   August 14, 2019        TIMOTHY PEABODY  [address] [place], [place] [id number]        Dear DAVID,      Thank you for selecting [facility] (previously [facility]) for your healthcare needs.    Our records indicate you are due for the following services:     Follow-up office visit    To schedule an appointment or if you have further questions, please contact your primary clinic:   [facility]       [phone]   [facility]       [phone]              [facility]     [phone]      Powered by [employer]    Sincerely,    Floyd Baker M.D.

## 2022-02-15 NOTE — PROGRESS NOTES
PEABODY, TIMOTHY              :  1975   2017      MRN: [id number]  CHART NOTE:    1)  The patient was given the results of his wound culture.  There was light growth of anaerobic gram negative bacilli.  It is felt the Trimethoprim sulfamethoxazole he is on should cover this.  Indeed, he tells me that his discharge has resolved and the surgical wound is looking much better.  2)  He was also informed that the culture of the aspirate from his back showed no growth.  This proves that the problem was a seroma and not an abscess.  He needs no further treatments in this regard.    3)  Patient informs me that he needs a refill of his pain medication and that is sent to [facility].  He will follow-up with me next week if he is not gradually feeling better.  D:  2017  T:  2017      Floyd Baker MD/

## 2022-02-15 NOTE — PROGRESS NOTES
Patient:   PEABODY, TIMOTHY J            MRN: [id number]            FIN: [id number]               Age:   45 years     Sex:  Male     :  1975   Associated Diagnoses:   Hypothyroidism; Moderate major depression; Type 2 diabetes mellitus; Chronic pain; Low testosterone in male   Author:   Samuel AMEZCUA, Floyd      Impression and Plan   Diagnosis     Hypothyroidism (WCU64-ZF E03.9).     Course:  Improving.    Orders     Orders   Charges (Evaluation and Management):  [id number] office o/p est mod 30-39 min (Charge) (Order): Quantity: 1, Low testosterone in male  Hypothyroidism  Moderate major depression  Type 2 diabetes mellitus  Benign essential HTN.     Orders (Selected)   Prescriptions  Prescribed  levothyroxine 25 mcg (0.025 mg) oral tablet: = 1 tab(s) ( 25 mcg ), Oral, daily, # 90 tab(s), 1 Refill(s), ARLET, Type: Maintenance, Pharmacy: [facility], 1 tab(s) Oral daily, 70, in, 21 10:21:00 CST, Height Measured, 288, lb, 21 10:21:00 CST, Weight Measured.     Diagnosis     Moderate major depression (ZCX66-LZ F32.1).     Course:  Well controlled.    Orders     Orders (Selected)   Prescriptions  Prescribed  DULoxetine 60 mg oral delayed release capsule: = 2 cap(s) ( 120 mg ), Oral, daily, # 180 cap(s), 1 Refill(s), Type: Maintenance, Pharmacy: [facility], 2 cap(s) Oral daily, 70, in, 21 10:21:00 CST, Height Measured, 288, lb, 21 10:21:00 CST, Weight Measured  amitriptyline 25 mg oral tablet: = 3 tab(s) ( 75 mg ), PO, hs, # 90 tab(s), 5 Refill(s), Type: Maintenance, Pharmacy: [facility], 3 tab(s) Oral hs, 70, in, 21 10:21:00 CST, Height Measured, 288, lb, 21 10:21:00 CST, Weight Measured.     Diagnosis     Type 2 diabetes mellitus (SPK50-DR E11.9).     Course:  Improving.    Orders     Orders (Selected)   Prescriptions  Prescribed  metFORMIN 500 mg oral tablet: = 1 tab(s), Oral, bid, Instructions: WM., # 180 tab(s), 1 Refill(s), Type: Maintenance, Pharmacy: [facility], 1 tab(s) Oral bid,Instr:WM., 70, in, 02/18/21 10:21:00 CST, Height Measured, 288, lb, 02/18/21 10:21:00 CST, Weight Measured.     Diagnosis     Chronic pain (RFZ41-HV G89.29).     Course:  Improving.    Orders     Orders (Selected)   Prescriptions  Prescribed  oxyCODONE 10 mg oral tablet: = 1 tab(s), Oral, qid, # 120 EA, 0 Refill(s), Type: Soft Stop, Pharmacy: [facility], 1 tab(s) Oral qid, 70, in, 12/14/20 7:59:00 CST, Height Measured, 288, lb, 12/14/20 7:59:00 CST, Weight Measured  tiZANidine 4 mg oral tablet: = 2 tab(s) ( 8 mg ), Oral, tid, # 60 tab(s), 1 Refill(s), Type: Maintenance, Pharmacy: [facility], 2 tab(s) Oral tid, 70, in, 02/18/21 10:21:00 CST, Height Measured, 288, lb, 02/18/21 10:21:00 CST, Weight Measured.     Diagnosis     Low testosterone in male (HPO25-TL R79.89).     Course:  Improving.    Orders     Orders (Selected)   Prescriptions  Prescribed  Testosterone Cypionate 200 mg/mL intramuscular solution: See Instructions, Instructions: Inject intramuscular 0.5 milliliter  ONCE weekly, # 10 mL, 1 Refill(s), Type: Soft Stop, Pharmacy: [facility], Inject intramuscular 0.5 milliliter; ONCE weekly, 70, in, 10/21/20 9:37:00 CDT, Height Measured, 288....        Visit Information      Date of Service: 02/18/2021 10:18 am  Performing Location: [facility]  Encounter#: [id number]      Primary Care Provider (PCP):  Floyd Baker MD# [id number]      Referring Provider:  Floyd Baker MD# [id number]   Visit type:  Scheduled follow-up.    Accompanied by:  No one.    Source of history:  Self.    Referral source:  Self.    History limitation:  None.       Chief Complaint   2/18/2021 10:21 AM CST   Pt here for med ck        History of Present Illness             The patient presents for follow-up evaluation of diabetes.  The quality of the patient's diabetes is described as being unchanged from previous visit.  Relieving factors consist of diet  changes, increased activity and medication.  Associated symptoms consist of none.  Medical encounters: none.  Compliance problems: none.  Glucose results: within target range.  Hemoglobin A1c results: within target range.  Lifestyle modification: weight reduction, diet, increased physical activity.  Medications: see medication list .  Additional pertinent history: last podiatric foot exam: 2/18/2021 and eye exam recommended.        Interval History   Hypothyroidism   none   .  Side effects of medication none.  Thyroid function tests within normal range.  The course is progressing as expected.  The effect on daily activities is no change in activity level, no change in sleeping patterns and no change in work/school duties.  Associated symptoms characterized by no fatigue, hair loss, weight gain, palpitations, constipation, joint pain, change in skin color, Altered mental status or tremor.     Depression   Side effects of medication unchanged.  The course is progressing as expected.  Compliance problems: none.  The effect on daily activities is no change in activity level, no change in eating habits and no change in sleeping patterns.  Medical encounters: none.  Associated symptoms characterized by no insomnia, weight gain, weight loss, loss of appetite or suicidal thoughts.        Review of Systems   Constitutional:  Negative.    Eye:  Negative.    Ear/Nose/Mouth/Throat:  Negative.    Respiratory:  Negative.    Cardiovascular:  Negative.    Gastrointestinal:  Negative.    Genitourinary:  Negative.    Hematology/Lymphatics:  Negative.    Endocrine:  Negative.    Immunologic:  Negative.    Musculoskeletal:  Negative.    Integumentary:  Negative.    Neurologic:  Negative.    Psychiatric:  Negative.    All other systems reviewed and negative      Health Status   Allergies:    Nonallergic Reactions (Selected)  Severe  Doxycycline (Vomiting)   Medications:  (Selected)   Prescriptions  Prescribed  DULoxetine 60 mg oral  delayed release capsule: = 2 cap(s) ( 120 mg ), Oral, daily, # 180 cap(s), 1 Refill(s), Type: Maintenance, Pharmacy: [facility], 2 cap(s) Oral daily, 70, in, 02/18/21 10:21:00 CST, Height Measured, 288, lb, 02/18/21 10:21:00 CST, Weight Measured  SUMAtriptan 100 mg oral tablet: See Instructions, Instructions: TAKE ONE (1) TABLET BY MOUTH ONCE AS NEEDED FOR MIGRAINE HEADACHE, # 9 tab(s), 4 Refill(s), Type: Soft Stop, Pharmacy: [facility], TAKE ONE (1) TABLET BY MOUTH ONCE AS NEEDED FOR MIGRAINE HEADACHE, 70, in, 02/18...  Testosterone Cypionate 200 mg/mL intramuscular solution: See Instructions, Instructions: Inject intramuscular 0.5 milliliter  ONCE weekly, # 10 mL, 1 Refill(s), Type: Soft Stop, Pharmacy: [facility], Inject intramuscular 0.5 milliliter; ONCE weekly, 70, in, 10/21/20 9:37:00 CDT, Height Measured, 288...  amitriptyline 25 mg oral tablet: = 3 tab(s) ( 75 mg ), PO, hs, # 90 tab(s), 5 Refill(s), Type: Maintenance, Pharmacy: [facility], 3 tab(s) Oral hs, 70, in, 02/18/21 10:21:00 CST, Height Measured, 288, lb, 02/18/21 10:21:00 CST, Weight Measured  amphetamine-dextroamphetamine 10 mg oral tablet: = 1 tab(s) ( 10 mg ), Oral, tid, # 90 tab(s), 0 Refill(s), Type: Maintenance, Pharmacy: [facility], 1 tab(s) Oral tid, 70, in, 02/18/21 10:21:00 CST, Height Measured, 288, lb, 02/18/21 10:21:00 CST, Weight Measured  atorvastatin 20 mg oral tablet: = 1 tab(s) ( 20 mg ), PO, Daily, # 90 tab(s), 1 Refill(s), Type: Maintenance, Pharmacy: [facility], 1 tab(s) Oral daily, 70, in, 02/18/21 10:21:00 CST, Height Measured, 288, lb, 02/18/21 10:21:00 CST, Weight Measured  levothyroxine 25 mcg (0.025 mg) oral tablet: = 1 tab(s) ( 25 mcg ), Oral, daily, # 90 tab(s), 1 Refill(s), ARLET, Type: Maintenance, Pharmacy: [facility], 1 tab(s) Oral daily, 70, in, 02/18/21 10:21:00 CST, Height Measured, 288, lb, 02/18/21 10:21:00 CST, Weight Measured  metFORMIN 500 mg oral tablet:  = 1 tab(s), Oral, bid, Instructions: WM., # 180 tab(s), 1 Refill(s), Type: Maintenance, Pharmacy: [facility], 1 tab(s) Oral bid,Instr:WM., 70, in, 02/18/21 10:21:00 CST, Height Measured, 288, lb, 02/18/21 10:21:00 CST, Weight Measured  oxyCODONE 10 mg oral tablet: = 1 tab(s), Oral, qid, # 120 EA, 0 Refill(s), Type: Soft Stop, Pharmacy: [facility], 1 tab(s) Oral qid, 70, in, 12/14/20 7:59:00 CST, Height Measured, 288, lb, 12/14/20 7:59:00 CST, Weight Measured  tiZANidine 4 mg oral tablet: = 2 tab(s) ( 8 mg ), Oral, tid, # 60 tab(s), 1 Refill(s), Type: Maintenance, Pharmacy: [facility], 2 tab(s) Oral tid, 70, in, 02/18/21 10:21:00 CST, Height Measured, 288, lb, 02/18/21 10:21:00 CST, Weight Measured   Problem list:    All Problems  Benign essential HTN / SNOMED CT 0444409 / Confirmed  Cartilage tear / ICD-9-.9 / Confirmed  Chronic Lumbar Pain / ICD-9-.2 / Confirmed  Chronic pain / SNOMED CT 508619691 / Confirmed  Low testosterone in male / SNOMED CT 800047157 / Confirmed  Hypothyroidism / SNOMED CT 01970353 / Confirmed  Insomnia / ICD-9-.52 / Confirmed  Migraine Headache / ICD-9-.90 / Confirmed  Mild Episode of Depression / ICD-9-.31 / Confirmed  Moderate major depression / SNOMED CT 7763513 / Confirmed  Morbid obesity / SNOMED CT 636473935 / Confirmed  Obesity / ICD-9-.00 / Probable  Onychomycosis / SNOMED CT 8898678747 / Confirmed  Parkinson's disease / SNOMED CT 23963260 / Confirmed  PUD (Peptic Ulcer Disease) / ICD-9-.90 / Confirmed  Type 2 diabetes mellitus / SNOMED CT 476112406 / Confirmed  Resolved: *Hospitalized@[facility] - Pneumonia  Resolved: Tobacco user / ICD-9-.1  Canceled: Benign familial tremor / SNOMED CT 9324543571      Histories   Past Medical History:    Active  PUD (Peptic Ulcer Disease) (533.90)  Chronic Lumbar Pain (724.2)  Onychomycosis (2366768079)  Mild Episode of Depression (296.31)  Obesity (278.00)  Cartilage tear  (848.9)  Migraine Headache (346.90)  Insomnia (780.52)  Resolved  *Hospitalized@[facility] - Pneumonia: Onset on 2/14/2015 at 39 years.  Resolved on 2/19/2015 at 39 years.  Tobacco user (305.1):  Resolved.   Family History:    Diabetes.  Father     Procedure history:    Spinal fusion (SNOMED CT 08705892) performed by Freddy Duvall MD on 8/22/2017 at 41 Years.  Comments:  8/31/2017 8:02 AM CDT - Ashley Amaro  L4-5.  Fusion L5-S1 in 2015 at 40 Years.  Comments:  2/3/2015 8:49 AM Floyd Raines MD  02/05/2015  [facility]  Dr. Rios  Bilateral L5-S1 Decompression Foramina in 2014 at 39 Years.  Comments:  4/29/2014 3:20 PM Floyd Mayo MD, Dr.  [facility]  05/06/2014    Vasectomy (SNOMED CT 07296698) on 7/26/2012 at 36 Years.  Arthroscopy (SNOMED CT 88533235) in 2011 at 36 Years.  Comments:  7/11/2012 8:59 AM Miracle White  Bilateral knee  Polysomnogram (SNOMED CT 228098609) on 8/14/2009 at 33 Years.  Comments:  7/11/2012 9:09 AM Miracle White  No significant sleep-disorder findings.  Bruxism.  Follow-up indicated.  Tonsillectomy (SNOMED CT 226140506) in 1980 at 5 Years.   Social History:        Electronic Cigarette/Vaping Assessment            Electronic Cigarette Use: Never.      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  Lives with Self, Children, Spouse.  Living situation:               Home/Independent.      Exercise and Physical Activity Assessment: Does not exercise            Exercise frequency: No regular exercise..        Physical Examination    Vital Signs   2/18/2021 10:21 AM CST Peripheral Pulse Rate 112 bpm  HI    Systolic Blood Pressure 132 mmHg  HI    Diastolic Blood Pressure 88 mmHg  HI    Mean Arterial Pressure 103 mmHg    Oxygen Saturation 96 %      Measurements from flowsheet : Measurements   2/18/2021 10:21 AM CST Height Measured - Standard 70 in    Weight Measured - Standard 288 lb    BSA 2.54 m2    Body Mass Index 41.32 kg/m2  HI      General:  Alert and oriented, No acute distress.    HENT:  Normocephalic.    Neck:  Supple, No lymphadenopathy, No thyromegaly.    Respiratory:  Lungs are clear to auscultation, Respirations are non-labored, Breath sounds are equal, Symmetrical chest wall expansion.    Cardiovascular:  Normal rate, Regular rhythm, No murmur, No gallop, Good pulses equal in all extremities, Normal peripheral perfusion, No edema.    Gastrointestinal:  Soft, Non-tender, Non-distended, Normal bowel sounds, No organomegaly.    Musculoskeletal:  Normal range of motion, No swelling, No deformity, Normal gait.    Integumentary:  Warm, Dry, Pink, No foot ulcers or skin lesions..    Feet:  Normal by visual exam, Sensation intact, By monofilament exam.    Neurologic:  Alert, Oriented, Normal sensory, Normal motor function, No focal deficits.    Psychiatric:  Cooperative, Appropriate mood & affect.       Review / Management   Results review:  Lab results   2/15/2021 11:40 AM CST U Creatinine 137 mg/dL    U Microalbumin 0.3 mg/dL    Ur Microalb/Creat Ratio 2   2/10/2021 12:41 PM CST Sodium Level 138 mmol/L    Potassium Level 4.5 mmol/L    Chloride Level 100 mmol/L    CO2 Level 30 mmol/L    Glucose Level 94 mg/dL    BUN 16 mg/dL    Creatinine 1.13 mg/dL    BUN/Creat Ratio NOT APPLICABLE    eGFR 78 mL/min/1.73m2    eGFR African American 90 mL/min/1.73m2    Calcium Level 9.6 mg/dL    Bili Total 1.1 mg/dL    Alk Phos 91 unit/L    AST/SGOT 19 unit/L    ALT/SGPT 26 unit/L    Protein Total 7.5 gm/dL    Albumin Level 4.4 gm/dL    Globulin 3.1    A/G  Ratio 1.4    Cholesterol 138 mg/dL    Non-HDL 89    HDL 49 mg/dL    Chol/HDL Ratio 2.8    LDL 68    Triglyceride 120 mg/dL    TSH 3.16 mIU/L    Testosterone Total 191 ng/dL  LOW    WBC 6.6    RBC 5.70    Hgb 16.4 gm/dL    Hct 48.0 %    MCV 84.2 fL    MCH 28.8 pg    MCHC 34.2 gm/dL    RDW 14.7 %    Platelet 332    MPV 9.8 fL   .

## 2022-02-15 NOTE — TELEPHONE ENCOUNTER
---------------------  From: Crissy Kim CMA   To: Floyd Baker MD;     Sent: 2020 4:27:46 PM CST  Subject: refill-testosterone     PCP:   LYDIA    Medication:   testosterone  Last Filled:  19    Quantity:  _  Refills:  _      Date of last office visit and reason:   _      Note:  _    Pharmacy:     Resource:   _  Phone:   _

## 2022-02-15 NOTE — LETTER
(Inserted Image. Unable to display)       October 10, 2019      TIMOTHY PEABODY  [address] [place], [place] [id number]          Dear DAVID,      Thank you for selecting [facility] for your healthcare needs.     Our records indicate you are due for the following services:    Fasting Lab Tests ~ Please do not eat or drink anything 10 hours prior to your scheduled appointment time.  (Water and any medications that you may need are allowed unless directed otherwise.)  Urine Labs ~ Please be prepared to leave a urine specimen for evaluation.    If you had your labs done at another facility or with [facility] at [facility], please bring in a copy of the results to your next visit, mail a copy, or drop off a copy of your results to your Healthcare Provider.    Medication Check  Diabetic Exam ~ Please bring your glucose meter and/or your blood glucose diary to your appointment.  Hypertension Check ~ Please remember to bring any at-home blood pressure readings with you to your appointment.    You are due for lab work and an office visit; please schedule the lab appointment 1 week before the office visit.  This will assure all results are available to discuss with your Healthcare Provider during your visit.    **It is very helpful if you bring your medication bottles to your appointment.  This assures we have all of your current medications, including strength and dosing information, documented accurately in your medical record.    To schedule an appointment or if you have further questions, please contact your primary clinic:   [facility]       [phone]   [facility]       [phone]              [facility]     [phone]    Powered by [employer]    Sincerely,    Floyd Baker MD

## 2022-02-15 NOTE — RESULTS
(Inserted Image. Unable to display)          (Inserted Image. Unable to display)     [address], [place] [address]  Phone [phone]      ELECTROCARDIOGRAM REPORT    PEABODY, TIMOTHY J. :  1975  DATE OF EXAM:  2019 MRN:  [id number]   Ordering HCP:  Floyd Baker MD         Findings:  Rate 110 beats per minute.  HI interval 138 milliseconds.  QT interval 302 milliseconds.  Normal axis.      Interpretation:  Normal sinus rhythm with sinus tachy.  Slightly prolonged QRS.  Possible incomplete right bundle branch block and anterior fascicular block.  No acute ischemic changes.      Conclusion:  Abnormal.     Floyd Baker MD   Interpreting HCP    WENDI/destiney  D:  2019  T:  2019    ELECTROCARDIOGRAM REPORT

## 2022-02-15 NOTE — PROGRESS NOTES
Patient:   PEABODY, TIMOTHY J            MRN: [id number]            FIN: [id number]               Age:   43 years     Sex:  Male     :  1975   Associated Diagnoses:   Moderate major depression; Type 2 diabetes mellitus; Hypothyroidism; Migraine Headache   Author:   Samuel AMEZCUA, Floyd      Impression and Plan   Diagnosis     Moderate major depression (DYG65-NX F32.1).     Course:  Well controlled.    Orders     Orders   Charges (Evaluation and Management):  93297 office outpatient visit 25 minutes (Charge) (Order): Quantity: 1, Type 2 diabetes mellitus  Mild Episode of Depression  Moderate major depression  Hypothyroidism.     Orders (Selected)   Prescriptions  Prescribed  DULoxetine 60 mg oral delayed release capsule: = 2 cap(s) ( 120 mg ), po, daily, # 180 cap(s), 1 Refill(s), Type: Maintenance, Pharmacy: [facility], 2 cap(s) Oral daily  amitriptyline 25 mg oral tablet: = 1 tab(s) ( 25 mg ), PO, hs, # 30 tab(s), 0 Refill(s), Type: Maintenance, patient has supply at home (Rx).     Diagnosis     Type 2 diabetes mellitus (NGH61-NK E11.9).     Course:  Improving.    Orders     Orders (Selected)   Prescriptions  Prescribed  metFORMIN 500 mg oral tablet: = 1 tab(s), Oral, bidwm, # 180 tab(s), 1 Refill(s), ARLET, Type: Maintenance, Pharmacy: [facility], 1 tab(s) Oral bidwm.     Diagnosis     Hypothyroidism (ZYG64-NX E03.9).     Course:  Progressing as expected.    Orders     Orders (Selected)   Prescriptions  Prescribed  levothyroxine 25 mcg (0.025 mg) oral tablet: = 1 tab(s) ( 25 mcg ), Oral, daily, # 90 tab(s), 1 Refill(s), ARLET, Type: Maintenance, Pharmacy: [facility], 1 tab(s) Oral daily.     Diagnosis     Migraine Headache (HAC11-GS G43.909).     Course:  Progressing as expected.    Orders     Orders (Selected)   Prescriptions  Prescribed  SUMAtriptan 100 mg oral tablet: = 1 tab(s) ( 100 mg ), PO, Once, PRN: for migraine headache, # 9 tab(s), 5 Refill(s), Type: Soft Stop, Pharmacy: [facility], 1 tab(s) Oral once,PRN:for migraine headache.        Visit Information      Date of Service: 04/09/2019 01:20 pm  Performing Location: [facility]  Encounter#: [id number]      Primary Care Provider (PCP):  Floyd Baker MD# [id number]   Visit type:  Scheduled follow-up.    Accompanied by:  No one.    Source of history:  Self.    Referral source:  Self.    History limitation:  None.       Chief Complaint   4/9/2019 2:27 PM CDT     Pt here for med ck        History of Present Illness             The patient presents for follow-up evaluation of diabetes.  The quality of the patient's diabetes is described as being unchanged from previous visit.  Relieving factors consist of diet changes, increased activity and medication.  Associated symptoms consist of none.  Medical encounters: none.  Compliance problems: none.  Glucose results: within target range.  Hemoglobin A1c results: within target range.  Lifestyle modification: weight reduction, diet, increased physical activity.  Medications: see medication list .  Additional pertinent history:.        Interval History   Hypothyroidism   none   .  Side effects of medication none.  Thyroid function tests within normal range.  The course is progressing as expected.  The effect on daily activities is no change in activity level, no change in sleeping patterns and no change in work/school duties.  Associated symptoms characterized by no fatigue, hair loss, weight gain, palpitations, constipation, joint pain, change in skin color, Altered mental status or tremor.     Depression   Side effects of medication unchanged.  The course is progressing as expected.  Compliance problems: none.  The effect on daily activities is no change in activity level, no change in eating habits and no change in sleeping patterns.  Medical encounters: none.  Associated symptoms characterized by no insomnia, weight gain, weight loss, loss of appetite or suicidal  thoughts.        Review of Systems   Constitutional:  Negative.    Eye:  Negative.    Ear/Nose/Mouth/Throat:  Negative.    Respiratory:  Negative.    Cardiovascular:  Negative.    Gastrointestinal:  Negative.    Genitourinary:  Negative.    Hematology/Lymphatics:  Negative.    Endocrine:  Negative.    Immunologic:  Negative.    Musculoskeletal:  Negative.    Integumentary:  Negative.    Neurologic:  Negative.    Psychiatric:  Negative.    All other systems reviewed and negative      Health Status   Allergies:    Nonallergic Reactions (Selected)  Severe  Doxycycline (Vomiting)   Medications:  (Selected)   Prescriptions  Prescribed  DULoxetine 60 mg oral delayed release capsule: = 2 cap(s) ( 120 mg ), po, daily, # 180 cap(s), 1 Refill(s), Type: Maintenance, Pharmacy: [facility], 2 cap(s) Oral daily  SUMAtriptan 100 mg oral tablet: = 1 tab(s) ( 100 mg ), PO, Once, PRN: for migraine headache, # 9 tab(s), 5 Refill(s), Type: Soft Stop, Pharmacy: [facility], 1 tab(s) Oral once,PRN:for migraine headache  amitriptyline 25 mg oral tablet: = 1 tab(s) ( 25 mg ), PO, hs, # 30 tab(s), 0 Refill(s), Type: Maintenance, patient has supply at home (Rx)  atorvastatin 20 mg oral tablet: = 1 tab(s) ( 20 mg ), PO, Daily, # 90 tab(s), 1 Refill(s), Type: Maintenance, Pharmacy: [facility], 1 tab(s) Oral daily  levothyroxine 25 mcg (0.025 mg) oral tablet: = 1 tab(s) ( 25 mcg ), Oral, daily, # 90 tab(s), 1 Refill(s), ARLET, Type: Maintenance, Pharmacy: [facility], 1 tab(s) Oral daily  metFORMIN 500 mg oral tablet: = 1 tab(s), Oral, bidwm, # 180 tab(s), 1 Refill(s), ARLET, Type: Maintenance, Pharmacy: [facility], 1 tab(s) Oral bidwm  oxyCODONE 10 mg oral tablet: = 1 tab(s) ( 10 mg ), po, qid, # 120 tab(s), 0 Refill(s), Type: Maintenance, Pharmacy: [facility], 1 tab(s) Oral qid  raNITIdine 150 mg oral capsule: = 1 cap(s) ( 150 mg ), PO, daily, # 90 cap(s), 1 Refill(s), Type: Maintenance, Pharmacy:  [facility], 1 cap(s) Oral daily   Problem list:    All Problems (Selected)  Cartilage tear / ICD-9-.9 / Confirmed  Chronic Lumbar Pain / ICD-9-.2 / Confirmed  Chronic pain / SNOMED CT 817394011 / Confirmed  Hypothyroidism / SNOMED CT 85086117 / Confirmed  Insomnia / ICD-9-.52 / Confirmed  Migraine Headache / ICD-9-.90 / Confirmed  Mild Episode of Depression / ICD-9-.31 / Confirmed  Moderate major depression / SNOMED CT 1635092 / Confirmed  Morbid obesity / SNOMED CT 759512858 / Confirmed  Obesity / ICD-9-.00 / Probable  Onychomycosis / SNOMED CT 1316760827 / Confirmed  PUD (Peptic Ulcer Disease) / ICD-9-.90 / Confirmed  Type 2 diabetes mellitus / SNOMED CT 893124010 / Confirmed      Histories   Past Medical History:    Active  PUD (Peptic Ulcer Disease) (533.90)  Chronic Lumbar Pain (724.2)  Onychomycosis (5979543852)  Mild Episode of Depression (296.31)  Obesity (278.00)  Cartilage tear (848.9)  Migraine Headache (346.90)  Insomnia (780.52)  Resolved  *[facility] - Pneumonia: Onset on 2/14/2015 at 39 years.  Resolved on 2/19/2015 at 39 years.  Tobacco user (305.1):  Resolved.   Family History:       Procedure history:    Spinal fusion (SNOMED CT 43895060) performed by Jadiel AMEZCUA [place] on 8/22/2017 at 41 Years.  Comments:  8/31/2017 8:02 AM CDT - Ashley Amaro  L4-5.  Fusion L5-S1 in 2015 at 40 Years.  Comments:  2/3/2015 8:49 AM Floyd Raines MD  02/05/2015  [facility]  Dr. Rios  Bilateral L5-S1 Decompression Foramina in 2014 at 39 Years.  Comments:  4/29/2014 3:20 PM DERRICK - Floyd Baker MD, Dr.  [facility]  05/06/2014    Vasectomy (SNOMED CT 59239015) on 7/26/2012 at 36 Years.  Arthroscopy (SNOMED CT 76973431) in 2011 at 36 Years.  Comments:  7/11/2012 8:59 AM Miracle White  Bilateral knee  Polysomnogram (SNOMED CT 602383393) on 8/14/2009 at 33 Years.  Comments:  7/11/2012 9:09 AM Miracle Whtie  No  significant sleep-disorder findings.  Bruxism.  Follow-up indicated.  Tonsillectomy (SNOMED CT 996751756) in 1980 at 5 Years.   Social History:        Alcohol Assessment: Denies Alcohol Use            Never      Tobacco Assessment: Past            Past, Cigarettes, 4 per day.                     Comments:                      04/29/2014 - Floyd Baker MD                     Quit 04/01/2014      Substance Abuse Assessment            Never      Employment and Education Assessment            Employed                     Comments:                      04/29/2014 - Floyd Baker [employer]      Home and Environment Assessment            Marital status:  (Living together).  Lives with Self, Children, Spouse.  Living situation:               Home/Independent.      Exercise and Physical Activity Assessment: Does not exercise            Exercise frequency: No regular exercise..      Physical Examination   Vital Signs   4/9/2019 2:27 PM CDT Peripheral Pulse Rate 86 bpm    Oxygen Saturation 97 %      Measurements from flowsheet : Measurements   4/9/2019 2:27 PM CDT Height Measured - Standard 70 in    Weight Measured - Standard 299.4 lb    BSA 2.59 m2    Body Mass Index 42.95 kg/m2  HI      General:  Alert and oriented, No acute distress.    HENT:  Normocephalic.    Neck:  Supple, No lymphadenopathy, No thyromegaly.    Respiratory:  Lungs are clear to auscultation, Respirations are non-labored, Breath sounds are equal, Symmetrical chest wall expansion.    Cardiovascular:  Normal rate, Regular rhythm, No murmur, No gallop, Good pulses equal in all extremities, Normal peripheral perfusion, No edema.    Gastrointestinal:  Soft, Non-tender, Non-distended, Normal bowel sounds, No organomegaly.    Musculoskeletal:  Normal range of motion, No swelling, No deformity, Normal gait.    Integumentary:  Warm, Dry, Pink, No foot ulcers or skin lesions..    Feet:  Normal by visual exam, Sensation  intact, By monofilament exam.    Neurologic:  Alert, Oriented, Normal sensory, Normal motor function, No focal deficits.    Psychiatric:  Cooperative, Appropriate mood & affect.

## 2022-02-15 NOTE — TELEPHONE ENCOUNTER
Entered by Crissy Kim CMA on January 15, 2020 3:21:48 PM CST  ---------------------  From: Crissy Kim CMA   To: [facility]    Sent: 1/15/2020 3:21:47 PM CST  Subject: Medication Management     ** Submitted: **  Order:DULoxetine (DULoxetine 60 mg oral delayed release capsule)  2 cap(s)  Oral  daily  Qty:  180 cap(s)        Refills:  1          Substitutions Allowed     Route To [facility]    Signed by Crissy Kim CMA  1/15/2020 3:21:00 PM    ** Submitted: **  Complete:DULoxetine (DULoxetine 60 mg oral delayed release capsule)   Signed by Crissy Kim CMA  1/15/2020 3:21:00 PM    ** Not Approved:  **  DULoxetine (DULOXETINE DR 60MG)  TAKE TWO (2) CAPSULES BY MOUTH DAILY  Qty:  180 cap(s)        Days Supply:  90        Refills:  1          Substitutions Allowed     Route To [facility]   Note from Pharmacy:  Generic For:CYMBALTA 60MG  Signed by Crissy Kim CMA            ** Patient matched by Crissy Kim CMA on 1/15/2020 3:21:22 PM CST **      ------------------------------------------  From: [facility]  To: Floyd Baker MD  Sent: January 15, 2020 2:56:10 PM CST  Subject: Medication Management  Due: January 16, 2020 2:56:10 PM CST    ** On Hold Pending Signature **  Drug: DULoxetine (DULoxetine 60 mg oral delayed release capsule)  TAKE TWO (2) CAPSULES BY MOUTH DAILY  Quantity: 180 cap(s)  Days Supply: 90  Refills: 1  Substitutions Allowed  Notes from Pharmacy: Generic For:CYMBALTA 60MG    Dispensed Drug: DULoxetine (DULoxetine 60 mg oral delayed release capsule)  TAKE TWO (2) CAPSULES BY MOUTH DAILY  Quantity: 180 cap(s)  Days Supply: 90  Refills: 1  Substitutions Allowed  Notes from Pharmacy: Generic For:CYMBALTA 60MG  ------------------------------------------

## 2022-02-15 NOTE — TELEPHONE ENCOUNTER
---------------------  From: Crissy Kim CMA   To: Floyd Baker MD;     Sent: 10/23/2019 9:09:46 AM CDT  Subject: refill     PCP:   LYDIA    Medication:   oxycodone  Last Filled:  19    Quantity:  120  Refills:  0      Date of last office visit and reason:   19      Note:  _    Pharmacy:     Resource:   [facility]  Phone:   [phone]  ** Submitted: **  Order:oxyCODONE (oxyCODONE 10 mg oral tablet)  1 tab(s)  po  q6 hrs  Qty:  120 tab(s)        Refills:  0          Substitutions Allowed     Route To Pharmacy - [facility]    Signed by Floyd Baker MD  10/24/2019 11:03:00 AM

## 2022-02-15 NOTE — TELEPHONE ENCOUNTER
---------------------  From: Robyn Kevin LPN (Phone Messages Pool ([address] [place] [address]))   To: Advanced Practice Provider Pool ([address] [place] [address]);     Sent: 6/28/2019 2:56:52 PM CDT  Subject: oxycodone     LYDIA out of clinic    Phone Message    PCP:   LYDIA      Time of Call:  2:44pm       Person Calling:  pt  Phone number:  [phone]    Note:   Pt LM stating he seen LYDIA about 2 weeks ago and his pain medication was not refilled.  Pt says he called [facility] and they have not received a refill for him.    Pt says he is needing his oxycodone refilled.    Please advise- pt was seen 6/13 for a med check but does not look like pain management was discussed.    Last fill 5/30/19 oxycodone 10mg 1 tab PO QID #120 with 0 refills    Last office visit and reason:  6/13/19 Depression Disease Management, Diabetes Type 2. Hypothyroidism---------------------  From: Floyd Christensen PA-C (Advanced Practice Provider Pool ([address] [place] [address]))   To: Phone Messages Pool ([address] [place] [address]);     Sent: 6/28/2019 4:45:54 PM CDT  Subject: RE: oxycodone     he will need to contact [facility] on Monday to discuss this---------------------  From: Robyn Kevin LPN (Phone Messages Pool ([address] [place] [address]))   To: Phone Messages Pool ([address] [place] [address]);     Sent: 6/28/2019 4:59:31 PM CDT  Subject: FW: oxycodone     Pt informed message will be forwarded to [facility] for Monday.---------------------  From: Crissy Kim CMA (Phone Messages Pool ([address] [place] [address]))   To: Floyd Baker MD;     Sent: 7/1/2019 7:47:49 AM CDT  Subject: FW: oxycodone

## 2022-02-15 NOTE — LETTER
(Inserted Image. Unable to display)   [facility] [address]  April 10, 2019      TIMOTHY PEABODY  [address] [place], [place] [id number]        Dear DAVID,     Thank you for selecting [facility] for your healthcare needs. Below you will find the results of the recent tests done at our clinic.        All results are within acceptable limits. No treatment changes are recommended at this time.      Result Name Current Result Previous Result Reference Range   Sodium Level (mmol/L)  137 4/9/2019  141 5/19/2017 135 - 146   Potassium Level (mmol/L)  4.5 4/9/2019  4.7 5/19/2017 3.5 - 5.3   Chloride Level (mmol/L)  104 4/9/2019  106 5/19/2017 98 - 110   CO2 Level (mmol/L)  23 4/9/2019  26 5/19/2017 20 - 32   Glucose Level (mg/dL)  93 4/9/2019 ((H)) 104 5/19/2017 65 - 99   BUN (mg/dL)  12 4/9/2019  12 5/19/2017 7 - 25   Creatinine Level (mg/dL)  0.88 4/9/2019  0.93 5/19/2017 0.60 - 1.35   BUN/Creat Ratio  NOT APPLICABLE 4/9/2019  13 5/19/2017 6 - 22   eGFR (mL/min/1.73m2)  105 4/9/2019  > OR = 60 -    eGFR  (mL/min/1.73m2)  122 4/9/2019  >60 5/19/2017 > OR = 60 -    Calcium Level (mg/dL)  9.5 4/9/2019  9.3 5/19/2017 8.6 - 10.3   Bilirubin Total (mg/dL)  0.8 4/9/2019  0.2 - 1.2   Alkaline Phosphatase (unit/L)  62 4/9/2019  40 - 115   AST/SGOT (unit/L)  16 4/9/2019  10 - 40   ALT/SGPT (unit/L)  12 4/9/2019  9 - 46   Protein Total (gm/dL)  7.0 4/9/2019  6.1 - 8.1   Albumin Level (gm/dL)  4.1 4/9/2019  3.6 - 5.1   Globulin  2.9 4/9/2019  1.9 - 3.7   A/G Ratio  1.4 4/9/2019  1.0 - 2.5   Hgb A1c  5.4 4/9/2019   - <5.7   Cholesterol (mg/dL)  160 4/9/2019   - <200   Non-HDL Cholesterol  127 4/9/2019   - <130   HDL (mg/dL) ((L)) 33 4/9/2019  >40 -    Cholesterol/HDL Ratio  4.8 4/9/2019   - <5.0   LDL  97 4/9/2019     Triglyceride (mg/dL) ((H)) 205 4/9/2019   - <150   TSH (mIU/L)  3.83 4/9/2019  0.40 - 4.50   WBC  7.3 4/9/2019  5.9 5/19/2017 3.8 - 10.8   RBC ((H)) 6.16  4/9/2019  5.08 5/19/2017 4.20 - 5.80   Hgb (gm/dL)  15.3 4/9/2019  15.1 5/19/2017 13.2 - 17.1   Hct (%)  49.6 4/9/2019  45.5 5/19/2017 38.5 - 50.0   MCV (fL)  80.5 4/9/2019  90 5/19/2017 80.0 - 100.0   MCH (pg) ((L)) 24.8 4/9/2019  29.7 5/19/2017 27.0 - 33.0   MCHC (gm/dL) ((L)) 30.8 4/9/2019  33.2 5/19/2017 32.0 - 36.0   RDW (%) ((H)) 16.1 4/9/2019  12.0 5/19/2017 11.0 - 15.0   Platelet  350 4/9/2019  284 5/19/2017 140 - 400   MPV (fL)  10.7 4/9/2019  9.1 5/19/2017 7.5 - 12.5       Please contact my practice at [phone]  if you have any questions or concerns.     Sincerely,        Floyd Baker MD          What do your labs mean?  Below is a glossary of commonly ordered labs:  LDL   Bad Cholesterol   HDL   Good Cholesterol  AST/ALT   Liver Function   Cr/Creatinine   Kidney Function  Microalbumin   Kidney Function  BUN   Kidney Function  PSA   Prostate    TSH   Thyroid Hormone  HgbA1c   Diabetes Test   Hgb (Hemoglobin)   Red Blood Cells

## 2022-02-15 NOTE — TELEPHONE ENCOUNTER
---------------------  From: Crissy Kim CMA (eRx Pool ([id number] [facility]))   To: Floyd Baker MD;     Sent: 7/29/2019 9:53:49 AM CDT  Subject: FW: Medication Management   Due Date/Time: 7/30/2019 9:49:00 AM CDT             ** Patient matched by Crissy Kim CMA on 7/29/2019 9:53:34 AM CDT **      ------------------------------------------  From: [facility]  To: Floyd Baker MD  Sent: July 29, 2019 9:49:54 AM CDT  Subject: Medication Management  Due: July 30, 2019 9:49:54 AM CDT    ** On Hold Pending Signature **  Drug: oxyCODONE (oxyCODONE 10 mg oral tablet)  Take one (1) tablet four times daily  Quantity: 120 EA      Days Supply: 0         Refills: 0  Substitutions Allowed  Notes from Pharmacy:     Dispensed Drug: oxyCODONE (oxyCODONE 10 mg oral tablet)  Take one (1) tablet four times daily  Quantity: 120 EA      Days Supply: 0         Refills: 0  Substitutions Allowed  Notes from Pharmacy:   ---------------------------------------------------------------  From: Floyd Baker MD   To: [facility]    Sent: 7/30/2019 8:05:28 AM CDT  Subject: FW: Medication Management     ** Submitted: **  Complete:oxyCODONE (oxyCODONE 10 mg oral tablet)   Signed by Floyd Baker MD  7/30/2019 8:05:00 AM    ** Approved **  oxyCODONE (oxyCODONE HCl Tablet 10 MG)  Take one (1) tablet four times daily  Qty:  120 EA        Days Supply:  0          Substitutions Allowed     Route To Pharmacy - [facility]

## 2022-02-15 NOTE — TELEPHONE ENCOUNTER
Entered by Crissy Kim CMA on November 09, 2020 10:55:04 AM CST  ---------------------  From: Crissy Kim CMA   To: [facility]    Sent: 11/9/2020 10:55:04 AM CST  Subject: Medication Management     ** Submitted: **  Order:SUMAtriptan (SUMAtriptan 100 mg oral tablet)  See Instructions  TAKE ONE (1) TABLET BY MOUTH ONCE AS NEEDED FOR MIGRAINE HEADACHE  Qty:  9 tab(s)        Days Supply:  30        Refills:  4          Substitutions Allowed     Route To Pharmacy - [facility]    Signed by Crissy Kim CMA  11/9/2020 4:54:00 PM [facility]    ** Submitted: **  Complete:SUMAtriptan (SUMAtriptan 100 mg oral tablet)   Signed by Crissy Kim CMA  11/9/2020 4:55:00 PM [facility]    ** Not Approved:  **  SUMAtriptan (SUMATRIPTAN 100MG)  TAKE ONE (1) TABLET BY MOUTH ONCE AS NEEDED FOR MIGRAINE HEADACHE  Qty:  9 tab(s)        Days Supply:  30        Refills:  4          Substitutions Allowed     Route To Pharmacy - [facility]   Signed by Crissy Kim CMA            ** Patient matched by Crissy Kim CMA on 11/9/2020 10:54:45 AM CST **      ------------------------------------------  From: [facility]  To: Floyd Baker MD  Sent: November 9, 2020 9:56:10 AM CST  Subject: Medication Management  Due: November 7, 2020 12:21:53 AM CST     ** On Hold Pending Signature **     Drug: SUMAtriptan (SUMAtriptan 100 mg oral tablet), TAKE ONE (1) TABLET BY MOUTH ONCE AS NEEDED FOR MIGRAINE HEADACHE  Quantity: 9 tab(s)  Days Supply: 30  Refills: 4  Substitutions Allowed  Notes from Pharmacy:     Dispensed Drug: SUMAtriptan (SUMAtriptan 100 mg oral tablet), TAKE ONE (1) TABLET BY MOUTH ONCE AS NEEDED FOR MIGRAINE HEADACHE  Quantity: 9 tab(s)  Days Supply: 30  Refills: 4  Substitutions Allowed  Notes from Pharmacy:  ------------------------------------------

## 2022-02-15 NOTE — LETTER
(Inserted Image. Unable to display)   [facility] [address]  August 17, 2020      TIMOTHY PEABODY  [address] [place], [place] [id number]        Dear DAVID,     Thank you for selecting [facility] for your healthcare needs. Below you will find the results of the recent tests done at our clinic.        All results are within acceptable limits. No treatment changes are recommended at this time.      Result Name Current Result Previous Result Reference Range   Sodium Level (mmol/L)  139 8/12/2020  139 11/7/2019 135 - 146   Potassium Level (mmol/L)  4.5 8/12/2020  4.5 11/7/2019 3.5 - 5.3   Chloride Level (mmol/L)  105 8/12/2020  100 11/7/2019 98 - 110   CO2 Level (mmol/L)  26 8/12/2020  27 11/7/2019 20 - 32   Glucose Level (mg/dL)  98 8/12/2020  99 11/7/2019 65 - 99   BUN (mg/dL)  12 8/12/2020  12 11/7/2019 7 - 25   Creatinine Level (mg/dL)  0.96 8/12/2020  1.05 11/7/2019 0.60 - 1.35   BUN/Creat Ratio  NOT APPLICABLE 8/12/2020  NOT APPLICABLE 11/7/2019 6 - 22   eGFR (mL/min/1.73m2)  96 8/12/2020  87 11/7/2019 > OR = 60 -    eGFR  (mL/min/1.73m2)  111 8/12/2020  100 11/7/2019 > OR = 60 -    Calcium Level (mg/dL)  9.1 8/12/2020  9.2 11/7/2019 8.6 - 10.3   Bilirubin Total (mg/dL)  1.0 8/12/2020  1.0 11/7/2019 0.2 - 1.2   Alkaline Phosphatase (unit/L)  74 8/12/2020  70 11/7/2019 36 - 130   AST/SGOT (unit/L)  16 8/12/2020  15 11/7/2019 10 - 40   ALT/SGPT (unit/L)  19 8/12/2020  15 11/7/2019 9 - 46   Protein Total (gm/dL)  7.0 8/12/2020  7.2 11/7/2019 6.1 - 8.1   Albumin Level (gm/dL)  4.1 8/12/2020  4.2 11/7/2019 3.6 - 5.1   Globulin  2.9 8/12/2020  3.0 11/7/2019 1.9 - 3.7   A/G Ratio  1.4 8/12/2020  1.4 11/7/2019 1.0 - 2.5   Hgb A1c  5.6 8/12/2020 ((H)) 5.9 11/7/2019  - <5.7   Cholesterol (mg/dL)  100 8/12/2020  119 11/7/2019  - <200   Non-HDL Cholesterol  58 8/12/2020  80 11/7/2019  - <130   HDL (mg/dL)  42 8/12/2020 ((L)) 39 11/7/2019 > OR = 40 -     Cholesterol/HDL Ratio  2.4 8/12/2020  3.1 11/7/2019  - <5.0   LDL  38 8/12/2020  60 11/7/2019    Triglyceride (mg/dL)  113 8/12/2020  118 11/7/2019  - <150   TSH (mIU/L)  3.78 8/12/2020  4.11 11/7/2019 0.40 - 4.50   Testosterone Total (ng/dL) ((H)) 1,123 8/12/2020  250 - 827   WBC  7.4 8/12/2020  8.2 11/7/2019 3.8 - 10.8   RBC ((H)) 5.98 8/12/2020 ((H)) 6.24 11/7/2019 4.20 - 5.80   Hgb (gm/dL)  16.2 8/12/2020 ((H)) 17.2 11/7/2019 13.2 - 17.1   Hct (%) ((H)) 51.3 8/12/2020 ((H)) 52.7 11/7/2019 38.5 - 50.0   MCV (fL)  85.8 8/12/2020  84.5 11/7/2019 80.0 - 100.0   MCH (pg)  27.1 8/12/2020  27.6 11/7/2019 27.0 - 33.0   MCHC (gm/dL) ((L)) 31.6 8/12/2020  32.6 11/7/2019 32.0 - 36.0   RDW (%)  14.3 8/12/2020  13.9 11/7/2019 11.0 - 15.0   Platelet  342 8/12/2020  356 11/7/2019 140 - 400   MPV (fL)  10.3 8/12/2020  10.4 11/7/2019 7.5 - 12.5       Please contact my practice at [phone]  if you have any questions or concerns.     Sincerely,        Floyd Baker MD          What do your labs mean?  Below is a glossary of commonly ordered labs:  LDL   Bad Cholesterol   HDL   Good Cholesterol  AST/ALT   Liver Function   Cr/Creatinine   Kidney Function  Microalbumin   Kidney Function  BUN   Kidney Function  PSA   Prostate    TSH   Thyroid Hormone  HgbA1c   Diabetes Test   Hgb (Hemoglobin)   Red Blood Cells

## 2022-02-15 NOTE — TELEPHONE ENCOUNTER
---------------------  From: Crissy Kim CMA   To: Floyd Baker MD;     Sent: 2020 10:34:58 AM CST  Subject: refill request-oxycodone     PCP:   LYDIA    Medication:   oxycodone  Last Filled:  19    Quantity:  _  Refills:  _      Date of last office visit and reason:          Note:  pt requesting refill    Pharmacy: [facility]    Resource:   [facility]  Phone:   [phone]  ** Submitted: **  Order:oxyCODONE (oxyCODONE 10 mg oral tablet)  1 tab(s)  po  q6 hrs  Qty:  120 tab(s)        Refills:  0          Substitutions Allowed     Route To Pharmacy - [facility]    Signed by Floyd Baker MD  2020 11:20:00 AM

## 2022-02-15 NOTE — NURSING NOTE
Comprehensive Intake Entered On:  11/18/2019 4:20 PM CST    Performed On:  11/18/2019 4:16 PM CST by Crissy Kim CMA               Summary   Chief Complaint :   Pt here for HA and left sided face numbness   Weight Measured :   288 lb(Converted to: 288 lb 0 oz, 130.63 kg)    Height Measured :   70 in(Converted to: 5 ft 10 in, 177.80 cm)    Body Mass Index :   41.32 kg/m2 (HI)    Body Surface Area :   2.54 m2   Systolic Blood Pressure :   156 mmHg (HI)    Diastolic Blood Pressure :   94 mmHg (HI)    Mean Arterial Pressure :   115 mmHg   Peripheral Pulse Rate :   125 bpm (HI)    Oxygen Saturation :   90 % (LOW)    Crissy Kim CMA - 11/18/2019 4:16 PM CST   Health Status   Allergies Verified? :   Yes   Medication History Verified? :   Yes   Medical History Verified? :   Yes   Pre-Visit Planning Status :   Completed   Tobacco Use? :   Former smoker   Crissy Kim CMA - 11/18/2019 4:16 PM CST   Consents   Consent for Immunization Exchange :   Consent Granted   Consent for Immunizations to Providers :   Consent Granted   Crissy Kim CMA - 11/18/2019 4:16 PM CST   Meds / Allergies   (As Of: 11/18/2019 4:20:50 PM CST)   Allergies (Active)   doxycycline  Estimated Onset Date:   <not entered> 2013 ; Reactions:   Vomiting ; Created By:   Floyd Baker MD; Reaction Status:   Active ; Category:   Drug ; Substance:   doxycycline ; Type:   Intolerance ; Severity:   Severe ; Updated By:   Floyd Baker MD; Reviewed Date:   11/18/2019 4:19 PM CST        Medication List   (As Of: 11/18/2019 4:20:50 PM CST)   Prescription/Discharge Order    amitriptyline  :   amitriptyline ; Status:   Prescribed ; Ordered As Mnemonic:   amitriptyline 25 mg oral tablet ; Simple Display Line:   25 mg, 1 tab(s), PO, hs, 90 tab(s), 1 Refill(s) ; Ordering Provider:   Floyd Baker MD; Catalog Code:   amitriptyline ; Order Dt/Tm:   6/13/2019 4:10:00 PM CDT          atorvastatin  :   atorvastatin ; Status:   Prescribed ; Ordered As  Mnemonic:   atorvastatin 20 mg oral tablet ; Simple Display Line:   20 mg, 1 tab(s), PO, Daily, 90 tab(s), 1 Refill(s) ; Ordering Provider:   Floyd Baker MD; Catalog Code:   atorvastatin ; Order Dt/Tm:   6/13/2019 4:10:21 PM CDT          DULoxetine  :   DULoxetine ; Status:   Prescribed ; Ordered As Mnemonic:   DULoxetine 60 mg oral delayed release capsule ; Simple Display Line:   120 mg, 2 cap(s), po, daily, 180 cap(s), 1 Refill(s) ; Ordering Provider:   Floyd Baker MD; Catalog Code:   DULoxetine ; Order Dt/Tm:   6/13/2019 4:09:44 PM CDT          levothyroxine  :   levothyroxine ; Status:   Prescribed ; Ordered As Mnemonic:   levothyroxine 25 mcg (0.025 mg) oral tablet ; Simple Display Line:   25 mcg, 1 tab(s), Oral, daily, 90 tab(s), 1 Refill(s) ; Ordering Provider:   Floyd Baker MD; Catalog Code:   levothyroxine ; Order Dt/Tm:   9/5/2019 8:39:59 AM CDT          metFORMIN  :   metFORMIN ; Status:   Prescribed ; Ordered As Mnemonic:   metFORMIN 500 mg oral tablet ; Simple Display Line:   1 tab(s), Oral, bidwm, 180 tab(s), 1 Refill(s) ; Ordering Provider:   Floyd Baker MD; Catalog Code:   metFORMIN ; Order Dt/Tm:   6/13/2019 4:09:51 PM CDT          oxyCODONE  :   oxyCODONE ; Status:   Prescribed ; Ordered As Mnemonic:   oxyCODONE 10 mg oral tablet ; Simple Display Line:   10 mg, 1 tab(s), po, q6 hrs, 120 tab(s), 0 Refill(s) ; Ordering Provider:   Floyd Baker MD; Catalog Code:   oxyCODONE ; Order Dt/Tm:   10/24/2019 11:03:21 AM CDT          oxyCODONE  :   oxyCODONE ; Status:   Prescribed ; Ordered As Mnemonic:   oxyCODONE 10 mg oral tablet ; Simple Display Line:   1 tab(s), Oral, qid, 120 EA, 0 Refill(s) ; Ordering Provider:   Floyd Baker MD; Catalog Code:   oxyCODONE ; Order Dt/Tm:   8/28/2019 3:55:15 PM CDT          raNITIdine  :   raNITIdine ; Status:   Prescribed ; Ordered As Mnemonic:   raNITIdine 150 mg oral capsule ; Simple Display Line:   150 mg, 1 cap(s), PO, daily, 90 cap(s), 1 Refill(s) ;  Ordering Provider:   Floyd Baker MD; Catalog Code:   raNITIdine ; Order Dt/Tm:   6/13/2019 4:10:35 PM CDT          SUMAtriptan  :   SUMAtriptan ; Status:   Prescribed ; Ordered As Mnemonic:   SUMAtriptan 100 mg oral tablet ; Simple Display Line:   100 mg, 1 tab(s), PO, Once, PRN: for migraine headache, 9 tab(s), 5 Refill(s) ; Ordering Provider:   Floyd Baker MD; Catalog Code:   SUMAtriptan ; Order Dt/Tm:   6/13/2019 4:10:27 PM CDT          tadalafil  :   tadalafil ; Status:   Prescribed ; Ordered As Mnemonic:   tadalafil 10 mg oral tablet ; Simple Display Line:   See Instructions, 1 tab(s) Oral daily PRN planned sexual activity, 10 tab(s), 2 Refill(s) ; Ordering Provider:   Floyd Baker MD; Catalog Code:   tadalafil ; Order Dt/Tm:   11/6/2019 1:55:43 PM CST          testosterone  :   testosterone ; Status:   Prescribed ; Ordered As Mnemonic:   Testosterone Cypionate 200 mg/mL intramuscular solution ; Simple Display Line:   See Instructions, Inject intramuscular half (1/2) milliliter(ML) TWICE WEEKLY MONDAY/FRIDAY, 10 mL, 1 Refill(s) ; Ordering Provider:   Floyd Baker MD; Catalog Code:   testosterone ; Order Dt/Tm:   8/28/2019 3:55:34 PM CDT          varenicline  :   varenicline ; Status:   Prescribed ; Ordered As Mnemonic:   Chantix Continuing Month 1 mg oral tablet ; Simple Display Line:   See Instructions, USE AS DIRECTED BY PHYSICIAN, 53 EA, 3 Refill(s) ; Ordering Provider:   Floyd Baker MD; Catalog Code:   varenicline ; Order Dt/Tm:   11/1/2019 1:47:41 PM CDT

## 2022-02-15 NOTE — NURSING NOTE
Comprehensive Intake Entered On:  9/5/2019 8:25 AM CDT    Performed On:  9/5/2019 8:23 AM CDT by Crissy Kim CMA               Summary   Chief Complaint :   Pt here for f/u on new BP med   Weight Measured :   288 lb(Converted to: 288 lb 0 oz, 130.63 kg)    Height Measured :   70 in(Converted to: 5 ft 10 in, 177.80 cm)    Body Mass Index :   41.32 kg/m2 (HI)    Body Surface Area :   2.54 m2   Systolic Blood Pressure :   138 mmHg (HI)    Diastolic Blood Pressure :   88 mmHg (HI)    Mean Arterial Pressure :   105 mmHg   Peripheral Pulse Rate :   104 bpm (HI)    Oxygen Saturation :   98 %   Crissy Kim CMA - 9/5/2019 8:23 AM CDT   Health Status   Allergies Verified? :   Yes   Medication History Verified? :   Yes   Medical History Verified? :   Yes   Pre-Visit Planning Status :   Completed   Tobacco Use? :   Former smoker   Crissy Kim CMA - 9/5/2019 8:23 AM CDT   Consents   Consent for Immunization Exchange :   Consent Granted   Consent for Immunizations to Providers :   Consent Granted   Crissy Kim CMA - 9/5/2019 8:23 AM CDT   Meds / Allergies   (As Of: 9/5/2019 8:25:48 AM CDT)   Allergies (Active)   doxycycline  Estimated Onset Date:   <not entered> 2013 ; Reactions:   Vomiting ; Created By:   Floyd Baker MD; Reaction Status:   Active ; Category:   Drug ; Substance:   doxycycline ; Type:   Intolerance ; Severity:   Severe ; Updated By:   Floyd Baker MD; Reviewed Date:   9/5/2019 8:23 AM CDT        Medication List   (As Of: 9/5/2019 8:25:48 AM CDT)   Prescription/Discharge Order    amitriptyline  :   amitriptyline ; Status:   Prescribed ; Ordered As Mnemonic:   amitriptyline 25 mg oral tablet ; Simple Display Line:   25 mg, 1 tab(s), PO, hs, 90 tab(s), 1 Refill(s) ; Ordering Provider:   Floyd Baker MD; Catalog Code:   amitriptyline ; Order Dt/Tm:   6/13/2019 4:10:00 PM          atorvastatin  :   atorvastatin ; Status:   Prescribed ; Ordered As Mnemonic:   atorvastatin 20 mg oral tablet ;  Simple Display Line:   20 mg, 1 tab(s), PO, Daily, 90 tab(s), 1 Refill(s) ; Ordering Provider:   Floyd Baker MD; Catalog Code:   atorvastatin ; Order Dt/Tm:   6/13/2019 4:10:21 PM          DULoxetine  :   DULoxetine ; Status:   Prescribed ; Ordered As Mnemonic:   DULoxetine 60 mg oral delayed release capsule ; Simple Display Line:   120 mg, 2 cap(s), po, daily, 180 cap(s), 1 Refill(s) ; Ordering Provider:   Floyd Baker MD; Catalog Code:   DULoxetine ; Order Dt/Tm:   6/13/2019 4:09:44 PM          levothyroxine  :   levothyroxine ; Status:   Prescribed ; Ordered As Mnemonic:   levothyroxine 25 mcg (0.025 mg) oral tablet ; Simple Display Line:   25 mcg, 1 tab(s), Oral, daily, 90 tab(s), 1 Refill(s) ; Ordering Provider:   Floyd Baker MD; Catalog Code:   levothyroxine ; Order Dt/Tm:   6/13/2019 4:10:15 PM          metFORMIN  :   metFORMIN ; Status:   Prescribed ; Ordered As Mnemonic:   metFORMIN 500 mg oral tablet ; Simple Display Line:   1 tab(s), Oral, bidwm, 180 tab(s), 1 Refill(s) ; Ordering Provider:   Floyd Baker MD; Catalog Code:   metFORMIN ; Order Dt/Tm:   6/13/2019 4:09:51 PM          oxyCODONE  :   oxyCODONE ; Status:   Prescribed ; Ordered As Mnemonic:   oxyCODONE 10 mg oral tablet ; Simple Display Line:   1 tab(s), Oral, qid, 120 EA, 0 Refill(s) ; Ordering Provider:   Floyd Baker MD; Catalog Code:   oxyCODONE ; Order Dt/Tm:   8/28/2019 3:55:15 PM          propranolol  :   propranolol ; Status:   Prescribed ; Ordered As Mnemonic:   propranolol 120 mg oral capsule, extended release ; Simple Display Line:   120 mg, 1 cap(s), Oral, daily, 30 cap(s), 0 Refill(s) ; Ordering Provider:   Floyd Baker MD; Catalog Code:   propranolol ; Order Dt/Tm:   8/28/2019 3:54:45 PM          raNITIdine  :   raNITIdine ; Status:   Prescribed ; Ordered As Mnemonic:   raNITIdine 150 mg oral capsule ; Simple Display Line:   150 mg, 1 cap(s), PO, daily, 90 cap(s), 1 Refill(s) ; Ordering Provider:   Floyd Baker MD;  Catalog Code:   raNITIdine ; Order Dt/Tm:   6/13/2019 4:10:35 PM          SUMAtriptan  :   SUMAtriptan ; Status:   Prescribed ; Ordered As Mnemonic:   SUMAtriptan 100 mg oral tablet ; Simple Display Line:   100 mg, 1 tab(s), PO, Once, PRN: for migraine headache, 9 tab(s), 5 Refill(s) ; Ordering Provider:   Floyd Baker MD; Catalog Code:   SUMAtriptan ; Order Dt/Tm:   6/13/2019 4:10:27 PM          testosterone  :   testosterone ; Status:   Prescribed ; Ordered As Mnemonic:   Testosterone Cypionate 200 mg/mL intramuscular solution ; Simple Display Line:   See Instructions, Inject intramuscular half (1/2) milliliter(ML) TWICE WEEKLY MONDAY/FRIDAY, 10 mL, 1 Refill(s) ; Ordering Provider:   Floyd Baker MD; Catalog Code:   testosterone ; Order Dt/Tm:   8/28/2019 3:55:34 PM

## 2022-02-15 NOTE — NURSING NOTE
Vital Signs Entered On:  8/29/2019 1:37 PM CDT    Performed On:  8/29/2019 1:37 PM CDT by Crissy Kim CMA               Vital Signs   Systolic Blood Pressure :   126 mmHg   Diastolic Blood Pressure :   78 mmHg   Mean Arterial Pressure :   94 mmHg   Peripheral Pulse Rate :   107 bpm (HI)    Crissy Kim CMA - 8/29/2019 1:37 PM CDT

## 2022-02-15 NOTE — PROGRESS NOTES
Patient:   PEABODY, TIMOTHY J            MRN: [id number]            FIN: [id number]               Age:   45 years     Sex:  Male     :  1975   Associated Diagnoses:   Chronic Lumbar Pain   Author:   Floyd Baker MD      Impression and Plan   Diagnosis     Chronic Lumbar Pain ([id number] M54.5).     Condition:  Stable, no contraindication for general anesthesia or surgical procedure..    Orders     Orders   Charges (Evaluation and Management):  [id number] office outpatient visit 25 minutes (Charge) (Order): Quantity: 1, Chronic Lumbar Pain.        Preoperative Information   Indication for surgery:  Musculoskeletal disorder.         Associated symptoms: Chronic Lumbar pain not controlled by medication or past lumbar fusions.    Accompanied by:  No one.    Source of history:  Self.    History limitation:  None.       Chief Complaint   Chief complaint discussed and confirmed correct.     2020 7:59 AM CST   Pt here for pre op DOS 20 with Dr Betancur for spinal cord stimulator at [facility]        Review of Systems   Constitutional:  Negative.    Eye:  Negative.    Ear/Nose/Mouth/Throat:  Negative.    Respiratory:  Negative.    Cardiovascular:  Negative.    Gastrointestinal:  Negative.    Genitourinary:  Negative.    Hematology/Lymphatics:  Negative.    Endocrine:  Negative.    Immunologic:  Negative.    Musculoskeletal:  Negative.    Integumentary:  Negative.    Neurologic:  Negative.    Psychiatric:  Negative.    All other systems reviewed and negative   No personal or family history of anesthesia reactions  No history of bleeding or blood clotting disorders  No history of heart murmur or need for prophylactic antibiotics  No history of blood transfusion  No history of recent infectious contacts       Health Status   Allergies:    Nonallergic Reactions (Selected)  Severe  Doxycycline (Vomiting)   Medications:  (Selected)   Prescriptions  Prescribed  DULoxetine 60 mg oral delayed release capsule:  = 2 cap(s) ( 120 mg ), Oral, daily, # 180 cap(s), 1 Refill(s), Type: Maintenance, Pharmacy: [facility], 2 cap(s) Oral daily, 70, in, 10/21/20 9:37:00 CDT, Height Measured, 288, lb, 12/09/19 13:24:00 CST, Weight Measured  SUMAtriptan 100 mg oral tablet: See Instructions, Instructions: TAKE ONE (1) TABLET BY MOUTH ONCE AS NEEDED FOR MIGRAINE HEADACHE, # 9 tab(s), 4 Refill(s), Type: Soft Stop, Pharmacy: [facility], TAKE ONE (1) TABLET BY MOUTH ONCE AS NEEDED FOR MIGRAINE HEADACHE, 70, in, 10/21...  Testosterone Cypionate 200 mg/mL intramuscular solution: See Instructions, Instructions: Inject intramuscular 0.5 milliliter  ONCE weekly, # 10 mL, 1 Refill(s), Type: Soft Stop, Pharmacy: [facility], Inject intramuscular 0.5 milliliter; ONCE weekly, 70, in, 10/21/20 9:37:00 CDT, Height Measured, 288...  amitriptyline 25 mg oral tablet: = 3 tab(s) ( 75 mg ), PO, hs, # 90 tab(s), 5 Refill(s), Type: Maintenance, Pharmacy: [facility], 3 tab(s) Oral hs, 70, in, 10/21/20 9:37:00 CDT, Height Measured, 288, lb, 12/09/19 13:24:00 CST, Weight Measured  amphetamine-dextroamphetamine 10 mg oral tablet: = 1 tab(s) ( 10 mg ), Oral, tid, # 90 tab(s), 0 Refill(s), Type: Maintenance, patient has supply at home (Rx)  atorvastatin 20 mg oral tablet: = 1 tab(s) ( 20 mg ), PO, Daily, # 90 tab(s), 1 Refill(s), Type: Maintenance, Pharmacy: [facility], 1 tab(s) Oral daily, 70, in, 10/21/20 9:37:00 CDT, Height Measured, 288, lb, 12/09/19 13:24:00 CST, Weight Measured  levothyroxine 25 mcg (0.025 mg) oral tablet: = 1 tab(s) ( 25 mcg ), Oral, daily, # 90 tab(s), 1 Refill(s), ARLET, Type: Maintenance, Pharmacy: [facility], 1 tab(s) Oral daily, 70, in, 04/23/20 8:03:00 CDT, Height Measured, 288, lb, 12/09/19 13:24:00 CST, Weight Measured  metFORMIN 500 mg oral tablet: = 1 tab(s), Oral, bid, Instructions: WM., # 180 tab(s), 1 Refill(s), Type: Maintenance, Pharmacy: [facility], 1 tab(s) Oral  bid,Instr:WM., 70, in, 08/12/20 9:26:00 CDT, Height Measured, 288, lb, 12/09/19 13:24:00 CST, Weight Measured  oxyCODONE 10 mg oral tablet: = 1 tab(s), Oral, qid, # 120 EA, 0 Refill(s), Type: Soft Stop, Pharmacy: [facility], 1 tab(s) Oral qid, 70, in, 10/21/20 9:37:00 CDT, Height Measured, 288, lb, 12/09/19 13:24:00 CST, Weight Measured  tadalafil 10 mg oral tablet: See Instructions, Instructions: 1 tab(s) Oral daily PRN planned sexual activity, # 10 tab(s), 2 Refill(s), Type: Maintenance, Pharmacy: [facility], 1 tab(s) Oral daily PRN planned sexual activity  tiZANidine 4 mg oral tablet: = 2 tab(s) ( 8 mg ), Oral, tid, # 60 tab(s), 1 Refill(s), Type: Maintenance, Pharmacy: [facility], 70, in, 10/21/20 9:37:00 CDT, Height Measured, 288, lb, 12/09/19 13:24:00 CST, Weight Measured   Problem list:    All Problems  Benign essential HTN / SNOMED CT 4105743 / Confirmed  Cartilage tear / ICD-9-.9 / Confirmed  Chronic Lumbar Pain / ICD-9-.2 / Confirmed  Chronic pain / SNOMED CT 371035416 / Confirmed  Hypothyroidism / SNOMED CT 02957284 / Confirmed  Insomnia / ICD-9-.52 / Confirmed  Low testosterone in male / SNOMED CT 598476578 / Confirmed  Migraine Headache / ICD-9-.90 / Confirmed  Mild Episode of Depression / ICD-9-.31 / Confirmed  Moderate major depression / SNOMED CT 4969431 / Confirmed  Morbid obesity / SNOMED CT 546472431 / Confirmed  Obesity / ICD-9-.00 / Probable  Onychomycosis / SNOMED CT 3656620035 / Confirmed  Parkinson's disease / SNOMED CT 60514518 / Confirmed  PUD (Peptic Ulcer Disease) / ICD-9-.90 / Confirmed  Type 2 diabetes mellitus / SNOMED CT 759582219 / Confirmed  Resolved: *Hospitalized@[facility] - Pneumonia  Resolved: Tobacco user / ICD-9-.1  Canceled: Benign familial tremor / SNOMED CT 6527635202      Histories   Past Medical History:    Active  PUD (Peptic Ulcer Disease) (533.90)  Chronic Lumbar Pain (724.2)  Onychomycosis  (2890381780)  Mild Episode of Depression (296.31)  Obesity (278.00)  Cartilage tear (848.9)  Migraine Headache (346.90)  Insomnia (780.52)  Resolved  *Hospitalized@[facility] - Pneumonia: Onset on 2/14/2015 at 39 years.  Resolved on 2/19/2015 at 39 years.  Tobacco user (305.1):  Resolved.   Family History:    Diabetes.  Father     Procedure history:    Spinal fusion (SNOMED CT 96292856) performed by Freddy Duvall MD on 8/22/2017 at 41 Years.  Comments:  8/31/2017 8:02 AM CDT - Ashley Amaro  L4-5.  Fusion L5-S1 in 2015 at 40 Years.  Comments:  2/3/2015 8:49 AM Floyd Raines MD  02/05/2015  [facility]  Dr. Rios  Bilateral L5-S1 Decompression Foramina in 2014 at 39 Years.  Comments:  4/29/2014 3:20 PM Floyd Mayo MD, Dr.  [facility]  05/06/2014    Vasectomy (SNOMED CT 09362946) on 7/26/2012 at 36 Years.  Arthroscopy (SNOMED CT 72590796) in 2011 at 36 Years.  Comments:  7/11/2012 8:59 AM Miracle White  Bilateral knee  Polysomnogram (SNOMED CT 345785293) on 8/14/2009 at 33 Years.  Comments:  7/11/2012 9:09 AM Miracle White  No significant sleep-disorder findings.  Bruxism.  Follow-up indicated.  Tonsillectomy (SNOMED CT 300151026) in 1980 at 5 Years.   Social History:        Electronic Cigarette/Vaping Assessment            Electronic Cigarette Use: Never.      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  Lives with Self, Children, Spouse.  Living situation:               Home/Independent.      Exercise and Physical Activity Assessment: Does not  exercise            Exercise frequency: No regular exercise..        Physical Examination   Vital signs reviewed  and within acceptable limits    Vital Signs   12/14/2020 7:59 AM CST Peripheral Pulse Rate 89 bpm    Systolic Blood Pressure 138 mmHg  HI    Diastolic Blood Pressure 84 mmHg  HI    Mean Arterial Pressure 102 mmHg    Oxygen Saturation 96 %      Measurements from flowsheet : Measurements   12/14/2020 7:59 AM CST Height Measured - Standard 70 in    Weight Measured - Standard 288 lb    BSA 2.54 m2    Body Mass Index 41.32 kg/m2  HI      General:  Alert and oriented, No acute distress.    Eye:  Pupils are equal, round and reactive to light, Extraocular movements are intact, Normal conjunctiva.    HENT:  Normocephalic, Tympanic membranes are clear, Normal hearing, Oral mucosa is moist, No pharyngeal erythema.    Neck:  Supple, Non-tender, No carotid bruit, No jugular venous distention, No lymphadenopathy, No thyromegaly.    Respiratory:  Lungs are clear to auscultation, Respirations are non-labored, Breath sounds are equal, Symmetrical chest wall expansion, No chest wall tenderness.    Cardiovascular:  Normal rate, Regular rhythm, No murmur, No gallop, Good pulses equal in all extremities, Normal peripheral perfusion, No edema.    Gastrointestinal:  Soft, Non-tender, Non-distended, Normal bowel sounds, No organomegaly.    Lymphatics:  No lymphadenopathy neck, axilla, groin.    Musculoskeletal:  Normal range of motion, Normal strength, No tenderness, No swelling, No deformity, Normal gait.    Integumentary:  Warm, Dry, Pink.    Neurologic:  Normal sensory, Normal motor function, No focal deficits, Cranial Nerves II-XII are grossly intact, Normal deep tendon reflexes.    Psychiatric:  Cooperative, Appropriate mood & affect, Normal judgment.       Review / Management   Results review:  Lab results   8/12/2020 11:03 AM CDT Sodium Level 139 mmol/L    Potassium Level 4.5 mmol/L    Chloride Level 105 mmol/L    CO2  Level 26 mmol/L    Glucose Level 98 mg/dL    BUN 12 mg/dL    Creatinine Level 0.96 mg/dL    BUN/Creat Ratio NOT APPLICABLE    eGFR 96 mL/min/1.73m2    eGFR African American 111 mL/min/1.73m2    Calcium Level 9.1 mg/dL    Bilirubin Total 1.0 mg/dL    Alkaline Phosphatase 74 unit/L    AST/SGOT 16 unit/L    ALT/SGPT 19 unit/L    Protein Total 7.0 gm/dL    Albumin Level 4.1 gm/dL    Globulin 2.9    A/G Ratio 1.4    Hgb A1c 5.6    Cholesterol 100 mg/dL    Non-HDL Cholesterol 58    HDL 42 mg/dL    Cholesterol/HDL Ratio 2.4    LDL 38    Triglyceride 113 mg/dL    TSH 3.78 mIU/L    Testosterone Total 1,123 ng/dL  HI    WBC 7.4    RBC 5.98  HI    Hgb 16.2 gm/dL    Hct 51.3 %  HI    MCV 85.8 fL    MCH 27.1 pg    MCHC 31.6 gm/dL  LOW    RDW 14.3 %    Platelet 342    MPV 10.3 fL   .

## 2022-02-15 NOTE — TELEPHONE ENCOUNTER
---------------------  From: Alondra Flowers CMA ([facility] [place] ([id number]_[facility]))   To: [facility] ([id number]_[facility])   ;     Sent: 10/5/2020 3:17:59 PM CDT  Subject: Oxycodone refill     Phone message    PCP: LYDIA     Person calling: Sreedhar  Phone number: [phone]  Time message left: 1322  Return call time: _    Reason: Sreedhar called and left a message requesting a refill on his oxycodone. Please advise on fill.   9/9/2020 oxyCODONE 10 mg oral tablet: = 1 tab(s), Oral, qid, # 120 EA, 0 Refill(s), [facility]    LOV: 8/12/20 telephone visit with LYDIA      Transferred to:  LYDIA Flowers CMA---------------------  From: Crissy Kim CMA ([facility] ([id number]_[facility])   )   To: Floyd Baker MD;     Sent: 10/5/2020 3:21:05 PM CDT  Subject: FW: Oxycodone refillrefill sent

## 2022-02-15 NOTE — LETTER
(Inserted Image. Unable to display)   April 02, 2019      DAVID PEABODY  [address] [place], [place] [id number]        Dear DAVID,      Thank you for selecting [facility]viously [facility]) for your healthcare needs.      Our records indicate you are due for the following services:       Medication Check.      To schedule an appointment or if you have further questions, please contact your primary clinic:   Critical access hospital       [phone]   [facility]       [phone]              [facility]     [phone]      Powered by [employer]    Sincerely,      Floyd Baker MD

## 2022-02-15 NOTE — TELEPHONE ENCOUNTER
---------------------  From: Crissy Kim CMA   To: Floyd Baker MD;     Sent: 8/29/2019 1:38:29 PM CDT  Subject: BP ck     BP today in clinic was   126/78 and pulse was 107

## 2022-02-15 NOTE — PROGRESS NOTES
Patient:   PEABODY, TIMOTHY J            MRN: [id number]            FIN: [id number]               Age:   42 years     Sex:  Male     :  1975   Associated Diagnoses:   Chronic Lumbar Pain   Author:   Floyd Baker MD      Impression and Plan   Treatment goals:  Decrease pain intensity, Improve psychological state, Improve functional mobility, Maintain functional ADL/IADL, Improve quality of life.    Diagnosis     Chronic Lumbar Pain (CWP69-AK M54.5).     Course:  Worsening.    Plan:  patient has follow up appointment with stanislaw on 2017.    Orders     Orders   Charges (Evaluation and Management):  44235 office outpatient visit 15 minutes (Charge) (Order): Quantity: 1, Chronic Lumbar Pain.     Orders (Selected)   Prescriptions  Prescribed  oxyCODONE 10 mg oral tablet: 1 tab(s) ( 10 mg ), po, qid, # 120 tab(s), 0 Refill(s), Type: Maintenance.        Visit Information      Date of Service: 2017 12:57 pm  Performing Location: [facility]  Encounter#: [id number]      Primary Care Provider (PCP):  Floyd Baker MD    NPI# [id number]   Accompanied by:  No one.    Source of history:  Self.    Referral source:  Self.    History limitation:  None.       Chief Complaint   2017 1:01 PM CST    Pt here for pain med ck        History of Present Illness       Has the patients condition changed significantly since their last visit?     _No  Has the patient been compliant with treatments, including non-opioid treatments?    _Yes  Has there been any aberrant opioid behavior since the last visit?     _No  Is the total opioid dose less than 90 morphine equivalents?    _Yes  Is there reasonable progress toward meeting established functional goals?     _Yes  Was the last drug test consistent with prescribed medications?     _Yes  Is another  drug test due at this visit?     _No  Has the ePDMP been reviewed?     _Yes  Is the patient complying with their signed Controlled Substance Abuse Agreement?      _Yes  Based on all the above, is the patient still a candidate for chronic opioid therapy?     _Yes                  The patient presents for a follow-up evaluation of pain.  The location of the pain is same as previous visit, bilaterally on the, back.  The quality of the patient's pain is described as worse.  The patients average pain was rated 9 /10 on the pain scale.  The pain is constant.  Exacerbating factors consist of recent surgery.  Relieving factors consist of analgesics.  Associated symptoms consist of decreased activity tolerance, depression, fatigue, insomnia and irritability.  Compliance problems: none.        Review of Systems   Constitutional:  Negative, No fatigue, No decreased activity.    Eye:  Negative.    Ear/Nose/Mouth/Throat:  Negative.    Respiratory:  Negative.    Cardiovascular:  Negative.    Gastrointestinal:  Negative, No nausea, No vomiting, No constipation.    Genitourinary:  Negative.    Hematology/Lymphatics:  Negative.    Endocrine:  Negative.    Immunologic:  Negative.    Musculoskeletal:  Negative except as documented in history of present illness.    Integumentary:  Negative, No pruritus.    Neurologic:  Negative, No abnormal balance, No confusion.    Psychiatric:  Depression, No anxiety, No memory difficulties, No sleeping problems.    All other systems reviewed and negative      Health Status   Allergies:    Nonallergic Reactions (Selected)  Severe  Doxycycline (Vomiting)   Medications:  (Selected)   Prescriptions  Prescribed  DULoxetine 60 mg oral delayed release capsule: 1 cap(s) ( 60 mg ), po, daily, # 30 cap(s), 5 Refill(s), Type: Maintenance, Pharmacy: [facility], 1 cap(s) po daily  SUMAtriptan 100 mg oral tablet: 1 tab(s) ( 100 mg ), PO, Once, PRN: for migraine headache, # 9 tab(s), 5 Refill(s), Type: Soft Stop  oxyCODONE 10 mg oral tablet: 1 tab(s) ( 10 mg ), po, qid, # 120 tab(s), 0 Refill(s), Type: Maintenance  raNITIdine 150 mg oral capsule: 1 cap(s) ( 150 mg  ), PO, daily, # 30 cap(s), 0 Refill(s), Type: Maintenance, OTC (Rx)  traZODone 100 mg oral tablet: 1 tab(s) ( 100 mg ), PO, hs, # 30 tab(s), 5 Refill(s), Type: Maintenance, Pharmacy: [facility], 1 tab(s) po hs   Problem list:    All Problems  Cartilage tear / ICD-9-.9 / Confirmed  Chronic Lumbar Pain / ICD-9-.2 / Confirmed  Insomnia / ICD-9-.52 / Confirmed  Migraine Headache / ICD-9-.90 / Confirmed  Mild Episode of Depression / ICD-9-.31 / Confirmed  Moderate major depression / SNOMED CT 9834052 / Confirmed  Obesity / ICD-9-.00 / Probable  Onychomycosis / SNOMED CT 3397471416 / Confirmed  PUD (Peptic Ulcer Disease) / ICD-9-.90 / Confirmed  Resolved: *Hospitalized@[facility] - Pneumonia  Resolved: Tobacco user / ICD-9-.1      Histories   Past Medical History:    Active  PUD (Peptic Ulcer Disease) (533.90)  Chronic Lumbar Pain (724.2)  Onychomycosis (6659498087)  Mild Episode of Depression (296.31)  Obesity (278.00)  Cartilage tear (848.9)  Migraine Headache (346.90)  Insomnia (780.52)  Resolved  *Hospitalized@[facility] - Pneumonia: Onset on 2/14/2015 at 39 years.  Resolved on 2/19/2015 at 39 years.  Tobacco user (305.1):  Resolved.   Family History:       Procedure history:    Spinal fusion (SNOMED CT 54959897) performed by Freddy Duvall MD on 8/22/2017 at 41 Years.  Comments:  8/31/2017 8:02 AM - Ashley Amaro  L4-5.  Fusion L5-S1 in 2015 at 40 Years.  Comments:  2/3/2015 8:49 AM - Floyd Baker MD  02/05/2015  [facility]  Dr. Rios  Bilateral L5-S1 Decompression Foramina in 2014 at 39 Years.  Comments:  4/29/2014 3:20 PM - Floyd Baker MD, Dr.  [facility]  05/06/2014    Vasectomy (SNOMED CT 29910465) on 7/26/2012 at 36 Years.  Arthroscopy (SNOMED CT 91496599) in 2011 at 36 Years.  Comments:  7/11/2012 8:59 AM - Miracle Griffiths  Bilateral knee  Polysomnogram (SNOMED CT 274330923) on 8/14/2009 at 33 Years.  Comments:  7/11/2012 9:09 AM -  Miracle Griffiths  No significant sleep-disorder findings.  Bruxism.  Follow-up indicated.  Tonsillectomy (SNOMED CT 606339159) in 1980 at 5 Years.   Social History:        Alcohol Assessment: Denies Alcohol Use            Never      Tobacco Assessment: Past            Past, Cigarettes, 4 per day.                     Comments:                      04/29/2014 - Floyd Baker MD                     Quit 04/01/2014      Substance Abuse Assessment            Never      Employment and Education Assessment            Employed                     Comments:                      04/29/2014 - Floyd Baker MD                     [employer]      Home and Environment Assessment            Marital status:  (Living together).  Lives with Self, Children, Spouse.  Living situation:               Home/Independent.      Exercise and Physical Activity Assessment: Does not exercise            Exercise frequency: No regular exercise..        Physical Examination   Vital Signs   12/7/2017 1:01 PM CST Peripheral Pulse Rate 91 bpm    Systolic Blood Pressure 132 mmHg    Diastolic Blood Pressure 86 mmHg    Mean Arterial Pressure 101 mmHg    BP Site Right arm    Oxygen Saturation 99 %      Measurements from flowsheet : Measurements   12/7/2017 1:01 PM CST Height Measured - Standard 70 in    Weight Measured - Standard 276.2 lb    BSA 2.48 m2    Body Mass Index 39.63 kg/m2      General:  Alert and oriented, Moderate distress.    Eye:  Pupils are equal, round and reactive to light, Normal conjunctiva.    Respiratory:  Respirations are non-labored.    Cardiovascular:  Normal rate, Regular rhythm, No edema.    Musculoskeletal:  Normal gait, wearing back brace.    Integumentary:  Warm, No rash.    Psychiatric:  Cooperative, Appropriate mood & affect, Normal judgment.       Review / Management   Results review      Professional Services   Counseling Summary:     Counseling included treatment options, risks and benefits, lifestyle  changes, current condition, medications, and dose adjustments.    The patient was attentive, and verbalized understanding.    Total visit time _ minutes and > 50% was spent in counseling and management of patients chronic pain condition.

## 2022-02-15 NOTE — TELEPHONE ENCOUNTER
---------------------  From: Julio LUCIA Crissy (eRx Pool ([id number]_[place] [facility]   To: Floyd Baker MD;     Sent: 5/2/2019 1:57:33 PM CDT  Subject: FW: Medication Management   Due Date/Time: 5/3/2019 10:46:00 AM CDT           ------------------------------------------  From: [facility]  To: Floyd Baker MD  Sent: May 2, 2019 10:46:34 AM CDT  Subject: Medication Management  Due: May 3, 2019 10:46:34 AM CDT    ** On Hold Pending Signature **  Drug: oxyCODONE (oxyCODONE 10 mg oral tablet)  1 tab(s) Oral qid  Quantity: 120 tab(s)    Days Supply: 0         Refills: 0  Substitutions Allowed  Notes from Pharmacy:     Dispensed Drug: oxyCODONE (oxyCODONE 10 mg oral tablet)  Take one (1) tablet four times daily  Quantity: 120 EA      Days Supply: 0         Refills: 0  Substitutions Allowed  Notes from Pharmacy:   ---------------------------------------------------------------  From: Floyd Baker MD   To: [facility]    Sent: 5/2/2019 2:15:37 PM CDT  Subject: FW: Medication Management     ** Submitted: **  Complete:oxyCODONE (oxyCODONE 10 mg oral tablet)   Signed by Floyd Baker MD  5/2/2019 2:15:00 PM    ** Approved **  oxyCODONE (OxyCODONE HCl Tablet 10 MG)  Take one (1) tablet four times daily  Qty:  120 EA        Days Supply:  0          Substitutions Allowed     Route To Pharmacy - [facility]

## 2022-02-15 NOTE — PROGRESS NOTES
Patient:   PEABODY, TIMOTHY J            MRN: [id number]            FIN: [id number]               Age:   42 years     Sex:  Male     :  1975   Associated Diagnoses:   Chronic pain   Author:   Blanca Beckwith      Chief Complaint   2018 1:02 PM CDT    Pt here for Oxycodone refill.        History of Present Illness   Patient presents today in follow up of their:  _    Has the patients condition changed significantly since their last visit?  no--he has had 3 back surgeries. He is using gabapentin and two types of physicaal therapy, including water therapy, it is helping somewhat   _  Has the patient been compliant with treatments, including non-opioid treatments? y   _  Has there been any aberrant opioid behavior since the last visit? n    _  Is the total opioid dose less than 90 morphine equivalents? yes, 60 MME   _  Is there reasonable progress toward meeting established functional goals? y    _  Was the last drug test consistent with prescribed medications?     _  Is another  drug test due at this visit? will allow to do with primary visit    _  Has the ePDMP been reviewed? yes    _   Is the patient complying with their signed Controlled Substance Abuse Agreement?  y, is seeing every other month   _  Based on all the above, is the patient still a candidate for chronic opioid therapy?  y   _           Review of Systems   Constitutional:  No fatigue, No decreased activity.    Gastrointestinal:  No nausea, No vomiting, No constipation.    Integumentary:  No pruritus.    Neurologic:  No abnormal balance, No confusion.    Psychiatric:  No anxiety, No depression, No memory difficulties, No sleeping problems.       Health Status   Allergies:    Nonallergic Reactions (Selected)  Severe  Doxycycline (Vomiting)   Medications:  (Selected)   Prescriptions  Prescribed  Abilify 10 mg oral tablet: 1 tab(s) ( 10 mg ), PO, Daily, # 30 tab(s), 0 Refill(s), Type: Maintenance, patient has supply at home (Rx)  DULoxetine 60  mg oral delayed release capsule: 2 cap(s) ( 120 mg ), po, daily, # 180 cap(s), 1 Refill(s), Type: Maintenance, Pharmacy: [facility]  SUMAtriptan 100 mg oral tablet: 1 tab(s) ( 100 mg ), PO, Once, PRN: for migraine headache, # 9 tab(s), 5 Refill(s), Type: Soft Stop  gabapentin 300 mg oral capsule: 2 cap(s) ( 600 mg ), PO, TID, # 180 cap(s), 0 Refill(s), Type: Maintenance, patient has supply at home (Rx)  oxyCODONE 10 mg oral tablet: 1 tab(s) ( 10 mg ), po, qid, Instructions: earliest fill date 7/30/2018, # 120 tab(s), 0 Refill(s), Type: Maintenance, Pharmacy: [facility], 1 tab(s) po qid,Instr:earliest fill date 7/30/2018  raNITIdine 150 mg oral capsule: 1 cap(s) ( 150 mg ), PO, daily, # 30 cap(s), 0 Refill(s), Type: Maintenance, OTC (Rx)  traZODone 100 mg oral tablet: 1 tab(s) ( 100 mg ), PO, hs, # 30 tab(s), 5 Refill(s), Type: Maintenance, Pharmacy: [facility], 1 tab(s) po hs   Problem list:    All Problems  PUD (Peptic Ulcer Disease) / ICD-9-.90 / Confirmed  Chronic Lumbar Pain / ICD-9-.2 / Confirmed  Onychomycosis / SNOMED CT 2387145519 / Confirmed  Mild Episode of Depression / ICD-9-.31 / Confirmed  Obesity / ICD-9-.00 / Probable  Cartilage tear / ICD-9-.9 / Confirmed  Migraine Headache / ICD-9-.90 / Confirmed  Insomnia / ICD-9-.52 / Confirmed  Moderate major depression / SNOMED CT 2563284 / Confirmed  Morbid obesity / SNOMED CT 110006906 / Confirmed  Chronic pain / SNOMED CT 118403308 / Confirmed  Resolved: Tobacco user / ICD-9-.1  Resolved: *Hospitalized@[facility] - Pneumonia      Histories   Past Medical History:    Active  PUD (Peptic Ulcer Disease) (533.90)  Chronic Lumbar Pain (724.2)  Onychomycosis (8528280385)  Mild Episode of Depression (296.31)  Obesity (278.00)  Cartilage tear (848.9)  Migraine Headache (346.90)  Insomnia (780.52)  Resolved  *Hospitalized@[facility] - Pneumonia: Onset on 2/14/2015 at 39 years.  Resolved on 2/19/2015 at 39  years.  Tobacco user (305.1):  Resolved.   Family History:       Procedure history:    Spinal fusion (SNOMED CT 95272184) performed by Freddy Duvall MD on 8/22/2017 at 41 Years.  Comments:  8/31/2017 8:02 AM - Ashley Amaro  L4-5.  Fusion L5-S1 in 2015 at 40 Years.  Comments:  2/3/2015 8:49 AM - Floyd Baker MD  02/05/2015  [facility]  Dr. Rios  Bilateral L5-S1 Decompression Foramina in 2014 at 39 Years.  Comments:  4/29/2014 3:20 PM - Floyd Baker MD, Dr.  [facility]  05/06/2014    Vasectomy (SNOMED CT 10753809) on 7/26/2012 at 36 Years.  Arthroscopy (SNOMED CT 51727533) in 2011 at 36 Years.  Comments:  7/11/2012 8:59 AM - Miracle Griffiths  Bilateral knee  Polysomnogram (SNOMED CT 805661783) on 8/14/2009 at 33 Years.  Comments:  7/11/2012 9:09 AM - Miracle Griffiths  No significant sleep-disorder findings.  Bruxism.  Follow-up indicated.  Tonsillectomy (SNOMED CT 765972313) in 1980 at 5 Years.   Social History:        Alcohol Assessment: Denies Alcohol Use            Never      Tobacco Assessment: Past            Past, Cigarettes, 4 per day.                     Comments:                      04/29/2014 - Floyd Baker MD                     Quit 04/01/2014      Substance Abuse Assessment            Never      Employment and Education Assessment            Employed                     Comments:                      04/29/2014 - Floyd Baker MD                     [employer]      Home and Environment Assessment            Marital status:  (Living together).  Lives with Self, Children, Spouse.  Living situation:               Home/Independent.      Exercise and Physical Activity Assessment: Does not exercise            Exercise frequency: No regular exercise..        Physical Examination   Vital Signs   6/28/2018 1:02 PM CDT Temperature Tympanic 98.2 DegF    Peripheral Pulse Rate 90 bpm    Pulse Site Radial artery    HR Method Electronic    Systolic Blood Pressure 138  mmHg  HI    Diastolic Blood Pressure 88 mmHg  HI    Mean Arterial Pressure 105 mmHg    BP Site Right arm    BP Method Manual      Measurements from flowsheet : Measurements   6/28/2018 1:02 PM CDT Height Measured - Standard 70 in    Weight Measured - Standard 291.2 lb    BSA 2.55 m2    Body Mass Index 41.78 kg/m2  HI      General:  Alert and oriented, No acute distress.    Eye:  Pupils are equal, round and reactive to light, Normal conjunctiva.    Respiratory:  Respirations are non-labored.    Musculoskeletal:  Normal gait.    Integumentary:  Warm, No rash.    Psychiatric:  Cooperative, Appropriate mood & affect, Normal judgment.       Impression and Plan   Diagnosis     Chronic pain (AIP98-GC G89.29).     Course:  Well controlled, Unchanged.    Plan:  Continue with current medication(s), dose and treatment plan.  Refill(s) have been printed and signed or sent electronically to the pharmacy.   Goals, benefits, side effects, and risks of chronic narcotic therapy reviewed and discussed.  Patient verbalized understanding and is in agreement to plan.    Patient instructed to follow up in:  _  weeks    _ 1 mos     _x 2 mos     _ 3 mos     _ 6 mos.    Patient Instructions:       Counseled: Patient, Verbalized understanding.    Orders     Orders (Selected)   Prescriptions  Prescribed  oxyCODONE 10 mg oral tablet: 1 tab(s) ( 10 mg ), po, qid, Instructions: earliest fill date 7/30/2018, # 120 tab(s), 0 Refill(s), Type: Maintenance, Pharmacy: [facility], 1 tab(s) po qid,Instr:earliest fill date 7/30/2018  Completed  oxyCODONE 10 mg oral tablet: 1 tab(s) ( 10 mg ), po, qid, Instructions: earliest fill date 6/30/2018, # 120 tab(s), 0 Refill(s), Type: Hard Stop, Pharmacy: [facility].             Professional Services   Counseling Summary:     Counseling included treatment options, risks and benefits, lifestyle changes, current condition, medications, and dose adjustments.    The patient was attentive, and  verbalized understanding.    Total visit time _ minutes and > 50% was spent in counseling and management of patients chronic pain condition.

## 2022-02-15 NOTE — PROGRESS NOTES
Patient:   PEABODY, TIMOTHY J            MRN: [id number]            FIN: [id number]               Age:   41 years     Sex:  Male     :  1975   Associated Diagnoses:   None   Author:   Floyd Baker MD      The [facility] website record of scheduled medication dispensations for this patient was reviewed prior to providing a new prescription.

## 2022-02-15 NOTE — NURSING NOTE
Pre op exam faxed     TO:  [facility]    FAX: [phone]    DOS: 12/17/20    DR: Brea    PROC: Spinal Cord Stimulator

## 2022-02-15 NOTE — TELEPHONE ENCOUNTER
Entered by Crissy Kim CMA on September 23, 2020 4:19:43 PM CDT  ---------------------  From: Crissy Kim CMA   To: [facility]    Sent: 9/23/2020 4:19:42 PM CDT  Subject: Medication Management     ** Not Approved: Refill not appropriate, filled and sent by provider **  testosterone (TESTOST CYP INJ 200MG/ML)  INJECT INTRAMUSCULAR HALF (1/2) MILLILITER(ML) TWICE WEEKLY MONDAY/FRIDAY  Qty:  10 mL        Days Supply:  30        Refills:  0          Substitutions Allowed     Route To Pharmacy - [facility]   Signed by Crissy Kim CMA            ------------------------------------------  From: [facility]  To: Floyd Baker MD  Sent: September 23, 2020 9:34:47 AM CDT  Subject: Medication Management  Due: September 9, 2020 4:21:06 PM CDT     ** On Hold Pending Signature **     Drug: testosterone (Testosterone Cypionate 200 mg/mL intramuscular solution), INJECT INTRAMUSCULAR HALF (1/2) MILLILITER(ML) TWICE WEEKLY MONDAY/FRIDAY  Quantity: 10 mL  Days Supply: 30  Refills: 0  Substitutions Allowed  Notes from Pharmacy:     Dispensed Drug: testosterone (Testosterone Cypionate 200 mg/mL intramuscular solution), INJECT INTRAMUSCULAR HALF (1/2) MILLILITER(ML) TWICE WEEKLY MONDAY/FRIDAY  Quantity: 10 mL  Days Supply: 30  Refills: 0  Substitutions Allowed  Notes from Pharmacy:  ------------------------------------------

## 2022-02-15 NOTE — TELEPHONE ENCOUNTER
Entered by Crissy Kim CMA on September 09, 2020 3:57:50 PM CDT  ---------------------  From: Crissy Kim CMA   To: [facility]    Sent: 9/9/2020 3:57:50 PM CDT  Subject: Medication Management     ** Not Approved: Refill not appropriate, filled and sent by provider **  oxyCODONE (OXYCODONE 10MG)  TAKE ONE (1) TABLET BY MOUTH FOUR TIMES DAILY  Qty:  120 tab(s)        Days Supply:  30        Refills:  0          Substitutions Allowed     Route To Pharmacy - [facility]   Signed by Crissy Kim CMA            ------------------------------------------  From: [facility]  To: Floyd Baker MD  Sent: September 9, 2020 1:50:44 PM CDT  Subject: Medication Management  Due: September 9, 2020 4:21:06 PM CDT     ** On Hold Pending Signature **     Drug: oxyCODONE (oxyCODONE 10 mg oral tablet), TAKE ONE (1) TABLET BY MOUTH FOUR TIMES DAILY  Quantity: 120 tab(s)  Days Supply: 30  Refills: 0  Substitutions Allowed  Notes from Pharmacy:     Dispensed Drug: oxyCODONE (oxyCODONE 10 mg oral tablet), TAKE ONE (1) TABLET BY MOUTH FOUR TIMES DAILY  Quantity: 120 tab(s)  Days Supply: 30  Refills: 0  Substitutions Allowed  Notes from Pharmacy:  ------------------------------------------

## 2022-02-15 NOTE — NURSING NOTE
Vital Signs Entered On:  8/12/2020 11:03 AM CDT    Performed On:  8/12/2020 11:03 AM CDT by Crissy Kim CMA               Vital Signs   Systolic Blood Pressure :   142 mmHg (HI)    Diastolic Blood Pressure :   88 mmHg (HI)    Mean Arterial Pressure :   106 mmHg   Crissy Kim CMA - 8/12/2020 11:03 AM CDT

## 2022-02-15 NOTE — TELEPHONE ENCOUNTER
---------------------  From: Crissy Kim CMA   To: Floyd Baker MD;     Sent: 2019 11:51:41 AM CDT  Subject: refill request-oxy     PCP:   [facility]    Medication:   oxycodone  Last Filled:      Quantity:  120  Refills:  0      Date of last office visit and reason:   19      Note:  _    Pharmacy: [facility]    Resource:   [facility]  Phone:   [phone]

## 2022-02-15 NOTE — TELEPHONE ENCOUNTER
---------------------  From: Crissy Kim CMA   To: Floyd Baker MD;     Sent: 3/18/2020 9:05:55 AM CDT  Subject: refill request-testosterone     PCP:   [facility]    Medication:   Testosterone as well as his Oxycodone  Last Filled:  _    Quantity:  _  Refills:  _      Date of last office visit and reason:   _      Note:  _    Pharmacy: _    Resource:   _  Phone:   _

## 2022-02-15 NOTE — LETTER
(Inserted Image. Unable to display)   [address] [place] [address]  February 11, 2021      TIMOTHY PEABODY  [address] [place], [place] [id number]        Dear DAVID,     Thank you for selecting [facility] for your healthcare needs. Below you will find the results of the recent tests done at our clinic.        All results are within acceptable limits. No treatment changes are recommended at this time.      Result Name Current Result Previous Result Reference Range   Sodium Level (mmol/L)  138 2/10/2021  139 8/12/2020 135 - 146   Potassium Level (mmol/L)  4.5 2/10/2021  4.5 8/12/2020 3.5 - 5.3   Chloride Level (mmol/L)  100 2/10/2021  105 8/12/2020 98 - 110   CO2 Level (mmol/L)  30 2/10/2021  26 8/12/2020 20 - 32   Glucose Level (mg/dL)  94 2/10/2021  98 8/12/2020 65 - 99   BUN (mg/dL)  16 2/10/2021  12 8/12/2020 7 - 25   Creatinine Level (mg/dL)  1.13 2/10/2021  0.96 8/12/2020 0.60 - 1.35   BUN/Creat Ratio  NOT APPLICABLE 2/10/2021  NOT APPLICABLE 8/12/2020 6 - 22   eGFR (mL/min/1.73m2)  78 2/10/2021  96 8/12/2020 > OR = 60 -    eGFR  (mL/min/1.73m2)  90 2/10/2021  111 8/12/2020 > OR = 60 -    Calcium Level (mg/dL)  9.6 2/10/2021  9.1 8/12/2020 8.6 - 10.3   Bilirubin Total (mg/dL)  1.1 2/10/2021  1.0 8/12/2020 0.2 - 1.2   Alkaline Phosphatase (unit/L)  91 2/10/2021  74 8/12/2020 36 - 130   AST/SGOT (unit/L)  19 2/10/2021  16 8/12/2020 10 - 40   ALT/SGPT (unit/L)  26 2/10/2021  19 8/12/2020 9 - 46   Protein Total (gm/dL)  7.5 2/10/2021  7.0 8/12/2020 6.1 - 8.1   Albumin Level (gm/dL)  4.4 2/10/2021  4.1 8/12/2020 3.6 - 5.1   Globulin  3.1 2/10/2021  2.9 8/12/2020 1.9 - 3.7   A/G Ratio  1.4 2/10/2021  1.4 8/12/2020 1.0 - 2.5   Cholesterol (mg/dL)  138 2/10/2021  100 8/12/2020  - <200   Non-HDL Cholesterol  89 2/10/2021  58 8/12/2020  - <130   HDL (mg/dL)  49 2/10/2021  42 8/12/2020 > OR = 40 -    Cholesterol/HDL Ratio  2.8 2/10/2021  2.4 8/12/2020  - <5.0   LDL  68  2/10/2021  38 8/12/2020    Triglyceride (mg/dL)  120 2/10/2021  113 8/12/2020  - <150   TSH (mIU/L)  3.16 2/10/2021  3.78 8/12/2020 0.40 - 4.50   Testosterone Total (ng/dL) ((L)) 191 2/10/2021 ((H)) 1,123 8/12/2020 250 - 827   WBC  6.6 2/10/2021  7.4 8/12/2020 3.8 - 10.8   RBC  5.70 2/10/2021 ((H)) 5.98 8/12/2020 4.20 - 5.80   Hgb (gm/dL)  16.4 2/10/2021  16.2 8/12/2020 13.2 - 17.1   Hct (%)  48.0 2/10/2021 ((H)) 51.3 8/12/2020 38.5 - 50.0   MCV (fL)  84.2 2/10/2021  85.8 8/12/2020 80.0 - 100.0   MCH (pg)  28.8 2/10/2021  27.1 8/12/2020 27.0 - 33.0   MCHC (gm/dL)  34.2 2/10/2021 ((L)) 31.6 8/12/2020 32.0 - 36.0   RDW (%)  14.7 2/10/2021  14.3 8/12/2020 11.0 - 15.0   Platelet  332 2/10/2021  342 8/12/2020 140 - 400   MPV (fL)  9.8 2/10/2021  10.3 8/12/2020 7.5 - 12.5       Please contact my practice at [phone] if you have any questions or concerns.     Sincerely,        Floyd Baker MD          What do your labs mean?  Below is a glossary of commonly ordered labs:  LDL   Bad Cholesterol   HDL   Good Cholesterol  AST/ALT   Liver Function   Cr/Creatinine   Kidney Function  Microalbumin   Kidney Function  BUN   Kidney Function  PSA   Prostate    TSH   Thyroid Hormone  HgbA1c   Diabetes Test   Hgb (Hemoglobin)   Red Blood Cells

## 2022-02-15 NOTE — TELEPHONE ENCOUNTER
---------------------  From: Crissy Kim CMA   To: Floyd Baker MD;     Sent: 2019 9:15:46 AM CDT  Subject: refill request-oxy and testosterone         PCP:   LYDIA    Medication:    oxycodone    Medication:    testosterone     Last Filled:  19                 Last Filled:   19      Quantity:_  Refills:           Date of last office visit and reason:   19      Note: pt requested     Ph[facility]acy: [facility]    Resource:   [facility]  Phone:  [phone]---------------------  From: Floyd Baker MD   To: Crissy Kim CMA;     Sent: 2019 12:24:49 PM CDT  Subject: RE: refill request-oxy and testosterone     time for a clinic visitpt informed and transferred to scheduling

## 2022-02-15 NOTE — PROGRESS NOTES
Patient:   PEABODY, TIMOTHY J            MRN: [id number]            FIN: [id number]               Age:   42 years     Sex:  Male     :  1975   Associated Diagnoses:   Severe major depression; Chronic Lumbar Pain   Author:   Floyd Baker MD      Impression and Plan   Diagnosis     Severe major depression (IRT55-DU F32.2).     Course:  Worsening.    Orders     Orders   Charges (Evaluation and Management):  31349 office outpatient visit 25 minutes (Charge) (Order): Quantity: 1, Severe major depression  Chronic Lumbar Pain.     Orders (Selected)   Outpatient Orders  Ordered  Referral (Request): 18 16:21:00 CST, Referred to: Psychiatry, Referred to: in network, Chronic Lumbar Pain  Severe major depression  Prescriptions  Prescribed  DULoxetine 60 mg oral delayed release capsule: 1 cap(s) ( 60 mg ), po, bid, # 60 cap(s), 5 Refill(s), Type: Maintenance, Pharmacy: [facility].     Diagnosis     Chronic Lumbar Pain (HFV31-ZY M54.5).     Course:  Worsening.    Plan:  filled out disability paperwork.    Orders     Orders (Selected)   Prescriptions  Prescribed  oxyCODONE 10 mg oral tablet: 1 tab(s) ( 10 mg ), po, qid, Instructions: EArliest fill date 2018, # 120 tab(s), 0 Refill(s), Type: Maintenance  Discontinued  cyclobenzaprine 10 mg oral tablet: 1 tab(s) ( 10 mg ), PO, q6 hrs, PRN: for spasm, # 50 tab(s), 1 Refill(s), Type: Maintenance, Pharmacy: [facility], 1 tab(s) po q6 hrs,PRN:for spasm.        Visit Information      Date of Service: 2018 07:53 am  Performing Location: [facility]  Encounter#: [id number]      Primary Care Provider (PCP):  Floyd Baker MD    NPI# [id number]   Visit type:  Scheduled follow-up.    Accompanied by:  No one.    Source of history:  Self.    Referral source:  Self.    History limitation:  None.       Chief Complaint   3/5/2018 3:42 PM CST     Pt here for med ck/refill and to fill out paperwork        History of Present Illness              The patient presents with back pain.  The back pain is located on both sides and lumbar region.  The back pain is described as aching and cramping.  The severity of the back pain is severe.  The back pain is constant.  The back pain has lasted for years.  Radiation of pain: to the left lower extremity and to the right lower extremity.  The context of the back pain: occurred in association with work.  History of two lumbar fusions at L4-5 and L5-S1.  Exacerbating factors consist of prolonged sitting, standing and walking.  Relieving factors consist of analgesics and rest.  Associated symptoms consist of none.  Prior treatment consists of physical therapy, pain management and surgery.        Interval History   Depression   Side effects of medication unchanged.  The course is worsening.  Compliance problems: none.  The effect on daily activities is change in activity level, change in sleeping patterns and no change in eating habits.  Associated symptoms characterized by insomnia, weight gain and suicidal thoughts.  Patient denies serious suicidal risk or plan at this time.        Review of Systems   Constitutional:  Negative.    Eye:  Negative.    Ear/Nose/Mouth/Throat:  Negative.    Respiratory:  Negative.    Cardiovascular:  Negative.    Gastrointestinal:  Negative.    Genitourinary:  Negative.    Hematology/Lymphatics:  Negative.    Endocrine:  Negative.    Immunologic:  Negative.    Musculoskeletal:  Back pain.    Integumentary:  Negative.    Neurologic:  Negative.    Psychiatric:  Depression.    All other systems reviewed and negative      Health Status   Allergies:    Nonallergic Reactions (Selected)  Severe  Doxycycline (Vomiting)   Medications:  (Selected)   Prescriptions  Prescribed  DULoxetine 60 mg oral delayed release capsule: 1 cap(s) ( 60 mg ), po, bid, # 60 cap(s), 5 Refill(s), Type: Maintenance, Pharmacy: [facility]  SUMAtriptan 100 mg oral tablet: 1 tab(s) ( 100 mg ), PO, Once, PRN: for  migraine headache, # 9 tab(s), 5 Refill(s), Type: Soft Stop  oxyCODONE 10 mg oral tablet: 1 tab(s) ( 10 mg ), po, qid, Instructions: EArliest fill date 04/04/2018, # 120 tab(s), 0 Refill(s), Type: Maintenance  raNITIdine 150 mg oral capsule: 1 cap(s) ( 150 mg ), PO, daily, # 30 cap(s), 0 Refill(s), Type: Maintenance, OTC (Rx)  traZODone 100 mg oral tablet: 1 tab(s) ( 100 mg ), PO, hs, # 30 tab(s), 5 Refill(s), Type: Maintenance, Pharmacy: [facility], 1 tab(s) po hs   Problem list:    All Problems (Selected)  Cartilage tear / ICD-9-.9 / Confirmed  Chronic Lumbar Pain / ICD-9-.2 / Confirmed  Insomnia / ICD-9-.52 / Confirmed  Migraine Headache / ICD-9-.90 / Confirmed  Mild Episode of Depression / ICD-9-.31 / Confirmed  Moderate major depression / SNOMED CT 2792458 / Confirmed  Obesity / ICD-9-.00 / Probable  Onychomycosis / SNOMED CT 0863037606 / Confirmed  PUD (Peptic Ulcer Disease) / ICD-9-.90 / Confirmed      Histories   Past Medical History:    Active  PUD (Peptic Ulcer Disease) (533.90)  Chronic Lumbar Pain (724.2)  Onychomycosis (0232073512)  Mild Episode of Depression (296.31)  Obesity (278.00)  Cartilage tear (848.9)  Migraine Headache (346.90)  Insomnia (780.52)  Resolved  *Hospitalized@[facility] - Pneumonia: Onset on 2/14/2015 at 39 years.  Resolved on 2/19/2015 at 39 years.  Tobacco user (305.1):  Resolved.   Family History:       Procedure history:    Spinal fusion (SNOMED CT 69014293) performed by Freddy Duvall MD on 8/22/2017 at 41 Years.  Comments:  8/31/2017 8:02 AM - Ashley Amaro  L4-5.  Fusion L5-S1 in 2015 at 40 Years.  Comments:  2/3/2015 8:49 AM - Floyd Baker MD  02/05/2015  [facility]  Dr. Rios  Bilateral L5-S1 Decompression Foramina in 2014 at 39 Years.  Comments:  4/29/2014 3:20 PM - Floyd Baker MD, Dr.  [facility]  05/06/2014    Vasectomy (SNOMED CT 00509507) on 7/26/2012 at 36 Years.  Arthroscopy (SNOMED CT  81764949) in 2011 at 36 Years.  Comments:  7/11/2012 8:59 AM - Miracle Griffiths  Bilateral knee  Polysomnogram (SNOMED CT 345612866) on 8/14/2009 at 33 Years.  Comments:  7/11/2012 9:09 AM - Miracle Griffiths  No significant sleep-disorder findings.  Bruxism.  Follow-up indicated.  Tonsillectomy (SNOMED CT 620261468) in 1980 at 5 Years.   Social History:        Alcohol Assessment: Denies Alcohol Use            Never      Tobacco Assessment: Past            Past, Cigarettes, 4 per day.                     Comments:                      04/29/2014 - Floyd Baker MD                     Quit 04/01/2014      Substance Abuse Assessment            Never      Employment and Education Assessment            Employed                     Comments:                      04/29/2014 - Floyd Baker MD                     [employer]      Home and Environment Assessment            Marital status:  (Living together).  Lives with Self, Children, Spouse.  Living situation:               Home/Independent.      Exercise and Physical Activity Assessment: Does not exercise            Exercise frequency: No regular exercise..        Physical Examination   Vital Signs   3/5/2018 3:42 PM CST Peripheral Pulse Rate 83 bpm    Systolic Blood Pressure 110 mmHg    Diastolic Blood Pressure 72 mmHg    Mean Arterial Pressure 85 mmHg    BP Site Right arm      Measurements from flowsheet : Measurements   3/5/2018 3:42 PM CST Height Measured - Standard 70 in    Weight Measured - Standard 285 lb    BSA 2.52 m2    Body Mass Index 40.89 kg/m2  HI      General:  Mild distress.    Respiratory:  Lungs are clear to auscultation, Respirations are non-labored.    Cardiovascular:  Normal rate, Regular rhythm.    Musculoskeletal:  Normal gait, Normal station.         Spine/torso exam: Lumbar ( Bilateral, No deformity, No erythema, No ecchymosis, No swelling, Tenderness, Range of motion ( Diminished ), Straight leg raising, supine ( Negative ) ).     Integumentary:  Warm, Dry, Pink.    Neurologic:  Alert, Oriented, Normal sensory, Normal motor function, No focal deficits, Normal deep tendon reflexes.    Psychiatric:  Cooperative, Appropriate mood & affect, Normal judgment.         Mood and affect: Calm, Depressed, Flat, Sad, Tearful.         Behavior: Relaxed.         Judgment: Able to make sensible decisions, Appropriate in social situations.         Abnormal / Psychotic thoughts: none.         Thought process: Appropriate.

## 2022-02-15 NOTE — PROGRESS NOTES
Patient:   PEABODY, TIMOTHY J            MRN: [id number]            FIN: [id number]               Age:   41 years     Sex:  Male     :  1975   Associated Diagnoses:   Chronic Lumbar Pain   Author:   Floyd Baker MD      Impression and Plan   Diagnosis     Chronic Lumbar Pain (EME34-HL M54.5).     Course:  Improving.    Plan:    1. Discharge summary and diagnostic tests reviewed  2. Medication reconciliation completed  3. History updated  4. Negative ROS at this time  5. Exam reveals no new problems or complications  6. Diagnostic tests this visit include: none  7. Plan: follow up in 5 days for wound check  8. No med refills needed at this time   .    Orders     Orders   Charges:  05009 transitional care manage service 7 day discharge (Charge) (Order): Quantity: 1, Chronic Lumbar Pain.        Visit Information   Visit type:  Scheduled follow-up.    Accompanied by:  Family member.    Source of history:  Self, Family member.    Referral source:  Self.    History limitation:  None.       Chief Complaint   Chief complaint discussed and confirmed correct.     2017 4:19 PM CDT    Pt here for post op        History of Present Illness             The patient presents with Patient had anterior/Posterior Lumbar fusion.  Here for post op check.  Pain controlled on Oxycodone and Oxycontin.  Appetite fine.  No bowel problems. No urine problems.  Had a fever two days ago and was seen in the ED.  Some serosanguinous discharge from the abdominal wound.  Leg pain and numbness already improved..        Review of Systems   Constitutional:  Negative.    Eye:  Negative.    Ear/Nose/Mouth/Throat:  Negative.    Respiratory:  Negative.    Cardiovascular:  Negative.    Gastrointestinal:  Negative.    Genitourinary:  Negative.    Hematology/Lymphatics:  Negative.    Endocrine:  Negative.    Immunologic:  Negative.    Musculoskeletal:  Negative.    Integumentary:  Negative.    Neurologic:  Negative.    Psychiatric:  Negative.           All other systems reviewed and negative      Health Status   Allergies:    Nonallergic Reactions (Selected)  Severe  Doxycycline (Vomiting)   Medications:  (Selected)   Prescriptions  Prescribed  DULoxetine 60 mg oral delayed release capsule: 1 cap(s) ( 60 mg ), po, daily, # 30 cap(s), 5 Refill(s), Type: Maintenance, Pharmacy: [facility], 1 cap(s) po daily  SUMAtriptan 100 mg oral tablet: 1 tab(s) ( 100 mg ), PO, Once, PRN: for migraine headache, # 9 tab(s), 1 Refill(s), Type: Soft Stop, Pharmacy: [facility], 1 tab(s) po once,PRN:for migraine headache  cyclobenzaprine 10 mg oral tablet: 1 tab(s) ( 10 mg ), PO, q6 hrs, PRN: for spasm, # 50 tab(s), 1 Refill(s), Type: Maintenance, Pharmacy: [facility], 1 tab(s) po q6 hrs,PRN:for spasm  oxyCODONE 10 mg oral tablet: 1 tab(s) ( 10 mg ), po, qid, # 120 tab(s), 0 Refill(s), Type: Maintenance  traZODone 100 mg oral tablet: 1 tab(s) ( 100 mg ), PO, hs, # 30 tab(s), 5 Refill(s), Type: Maintenance, Pharmacy: [facility], 1 tab(s) po hs  Documented Medications  Documented  OxyCONTIN 10 mg oral tablet, extended release: 1 tab(s) ( 10 mg ), PO, q12hr, 0 Refill(s), Type: Maintenance  Valium: 0 Refill(s), Type: Maintenance  oxyCODONE 10 mg oral tablet: 1 tab(s) ( 10 mg ), po, q4-6 hrs, # 20, 0 Refill(s), Type: Maintenance   Problem list:    All Problems  Cartilage tear / ICD-9-.9 / Confirmed  Chronic Lumbar Pain / ICD-9-.2 / Confirmed  Insomnia / ICD-9-.52 / Confirmed  Migraine Headache / ICD-9-.90 / Confirmed  Mild Episode of Depression / ICD-9-.31 / Confirmed  Moderate major depression / SNOMED CT 5502812 / Confirmed  Obesity / ICD-9-.00 / Probable  Onychomycosis / SNOMED CT 1947111853 / Confirmed  PUD (Peptic Ulcer Disease) / ICD-9-.90 / Confirmed  Resolved: *Hospitalized@[facility] - Pneumonia  Resolved: Tobacco user / ICD-9-.1      Histories   Past Medical History:    Active  PUD (Peptic Ulcer Disease)  (533.90)  Chronic Lumbar Pain (724.2)  Onychomycosis (0517654868)  Mild Episode of Depression (296.31)  Obesity (278.00)  Cartilage tear (848.9)  Migraine Headache (346.90)  Insomnia (780.52)  Resolved  *Hospitalized@[facility] - Pneumonia: Onset on 2/14/2015 at 39 years.  Resolved on 2/19/2015 at 39 years.  Tobacco user (305.1):  Resolved.   Family History:       Procedure history:    Fusion L5-S1 in 2015 at 40 Years.  Comments:  2/3/2015 8:49 AM - Floyd Baker MD  02/05/2015  [facility]  Dr. Rios  Bilateral L5-S1 Decompression Foramina in 2014 at 39 Years.  Comments:  4/29/2014 3:20 PM - Floyd Baker MD, Dr.  [facility]  05/06/2014    Vasectomy (SNOMED CT 42033065) on 7/26/2012 at 36 Years.  Arthroscopy (SNOMED CT 07575600) in 2011 at 36 Years.  Comments:  7/11/2012 8:59 AM - Miracle Griffiths  Bilateral knee  Polysomnogram (SNOMED CT 441103042) on 8/14/2009 at 33 Years.  Comments:  7/11/2012 9:09 AM - Miracle Griffiths  No significant sleep-disorder findings.  Bruxism.  Follow-up indicated.  Tonsillectomy (SNOMED CT 125363225) in 1980 at 5 Years.   Social History:        Alcohol Assessment: Denies Alcohol Use            Never      Tobacco Assessment: Past            Past, Cigarettes, 4 per day.                     Comments:                      04/29/2014 - Floyd Baker MD                     Quit 04/01/2014      Substance Abuse Assessment            Never      Employment and Education Assessment            Employed                     Comments:                      04/29/2014 - Floyd Baker MD                     [employer]      Home and Environment Assessment            Marital status:  (Living together).  Lives with Self, Children, Spouse.  Living situation:               Home/Independent.      Exercise and Physical Activity Assessment: Does not exercise            Exercise frequency: No regular exercise..        Physical Examination   Vital Signs   8/30/2017 4:33 PM  CDT Temperature Tympanic 99.0 DegF   8/30/2017 4:19 PM CDT Peripheral Pulse Rate 92 bpm    Systolic Blood Pressure 126 mmHg    Diastolic Blood Pressure 72 mmHg    Mean Arterial Pressure 90 mmHg    Oxygen Saturation 98 %      Measurements from flowsheet : Measurements   8/30/2017 4:19 PM CDT    Weight Measured - Standard                278 lb     General:  No acute distress.    Neck:  Supple, No lymphadenopathy, No thyromegaly.    Respiratory:  Lungs are clear to auscultation, Respirations are non-labored, Breath sounds are equal, Symmetrical chest wall expansion.    Cardiovascular:  Normal rate, Regular rhythm, No murmur, No gallop, Good pulses equal in all extremities, Normal peripheral perfusion, No edema.    Gastrointestinal:  Soft, Non-tender, Non-distended, Normal bowel sounds, No organomegaly.    Integumentary:  Warm, Dry, Pink, Surgical wounds without signs or symptoms of infection.  small amount of serosanguinous discharge from the abdominal wound..    Neurologic:  Alert, Oriented.    Psychiatric:  Cooperative, Appropriate mood & affect.

## 2022-02-15 NOTE — TELEPHONE ENCOUNTER
---------------------  From: Crissy Kim CMA   To: Floyd Baker MD;     Sent: 3/4/2019 3:08:45 PM CST  Subject: refill request-oxycodone     PCP:   LYDIA    Medication:   oxycodone  Last Filled:  19    Quantity:  120  Refills:  0      Date of last office visit and reason:   18      Note:  Pt called requesting refill.      [facility]    Resource:   [facility]  Phone:   [phone]   Please ask how his anxiety and sleep is doing.  If the lower dose is controlling his symptoms, he should stay on 25 mg daily.  Side effect of diarrhea does tend to improve after being on an SSRI around 7 to 10 days.  If he does need to increase it for anxiety or trouble sleeping he could try the whole tablet again.

## 2022-02-15 NOTE — NURSING NOTE
Comprehensive Intake Entered On:  8/28/2019 3:21 PM CDT    Performed On:  8/28/2019 3:19 PM CDT by Crissy Kim CMA               Summary   Chief Complaint :   Pt here for med ck   Weight Measured :   288 lb(Converted to: 288 lb 0 oz, 130.63 kg)    Height Measured :   70 in(Converted to: 5 ft 10 in, 177.80 cm)    Body Mass Index :   41.32 kg/m2 (HI)    Body Surface Area :   2.54 m2   Systolic Blood Pressure :   148 mmHg (HI)    Diastolic Blood Pressure :   86 mmHg (HI)    Mean Arterial Pressure :   107 mmHg   Peripheral Pulse Rate :   128 bpm (HI)    Oxygen Saturation :   96 %   Crissy Kim CMA - 8/28/2019 3:19 PM CDT   Health Status   Allergies Verified? :   Yes   Medication History Verified? :   Yes   Medical History Verified? :   Yes   Pre-Visit Planning Status :   Completed   Tobacco Use? :   Former smoker   Crissy Kim CMA - 8/28/2019 3:19 PM CDT   Consents   Consent for Immunization Exchange :   Consent Granted   Consent for Immunizations to Providers :   Consent Granted   Crissy Kim CMA - 8/28/2019 3:19 PM CDT   Meds / Allergies   (As Of: 8/28/2019 3:21:56 PM CDT)   Allergies (Active)   doxycycline  Estimated Onset Date:   <not entered> 2013 ; Reactions:   Vomiting ; Created By:   Floyd Baker MD; Reaction Status:   Active ; Category:   Drug ; Substance:   doxycycline ; Type:   Intolerance ; Severity:   Severe ; Updated By:   Floyd Baker MD; Reviewed Date:   8/28/2019 3:19 PM CDT        Medication List   (As Of: 8/28/2019 3:21:56 PM CDT)   Prescription/Discharge Order    amitriptyline  :   amitriptyline ; Status:   Prescribed ; Ordered As Mnemonic:   amitriptyline 25 mg oral tablet ; Simple Display Line:   25 mg, 1 tab(s), PO, hs, 90 tab(s), 1 Refill(s) ; Ordering Provider:   Floyd Baker MD; Catalog Code:   amitriptyline ; Order Dt/Tm:   6/13/2019 4:10:00 PM          atorvastatin  :   atorvastatin ; Status:   Prescribed ; Ordered As Mnemonic:   atorvastatin 20 mg oral tablet ;  Simple Display Line:   20 mg, 1 tab(s), PO, Daily, 90 tab(s), 1 Refill(s) ; Ordering Provider:   Floyd Baker MD; Catalog Code:   atorvastatin ; Order Dt/Tm:   6/13/2019 4:10:21 PM          DULoxetine  :   DULoxetine ; Status:   Prescribed ; Ordered As Mnemonic:   DULoxetine 60 mg oral delayed release capsule ; Simple Display Line:   120 mg, 2 cap(s), po, daily, 180 cap(s), 1 Refill(s) ; Ordering Provider:   Floyd Baker MD; Catalog Code:   DULoxetine ; Order Dt/Tm:   6/13/2019 4:09:44 PM          levothyroxine  :   levothyroxine ; Status:   Prescribed ; Ordered As Mnemonic:   levothyroxine 25 mcg (0.025 mg) oral tablet ; Simple Display Line:   25 mcg, 1 tab(s), Oral, daily, 90 tab(s), 1 Refill(s) ; Ordering Provider:   Floyd Baker MD; Catalog Code:   levothyroxine ; Order Dt/Tm:   6/13/2019 4:10:15 PM          metFORMIN  :   metFORMIN ; Status:   Prescribed ; Ordered As Mnemonic:   metFORMIN 500 mg oral tablet ; Simple Display Line:   1 tab(s), Oral, bidwm, 180 tab(s), 1 Refill(s) ; Ordering Provider:   Floyd Baker MD; Catalog Code:   metFORMIN ; Order Dt/Tm:   6/13/2019 4:09:51 PM          oxyCODONE 10 mg oral tablet  :   oxyCODONE 10 mg oral tablet ; Status:   Prescribed ; Ordered As Mnemonic:   oxyCODONE 10 mg oral tablet ; Simple Display Line:   1 tab(s), Oral, qid, 120 EA ; Ordering Provider:   Floyd Baker MD; Catalog Code:   oxyCODONE ; Order Dt/Tm:   7/30/2019 8:05:26 AM          raNITIdine  :   raNITIdine ; Status:   Prescribed ; Ordered As Mnemonic:   raNITIdine 150 mg oral capsule ; Simple Display Line:   150 mg, 1 cap(s), PO, daily, 90 cap(s), 1 Refill(s) ; Ordering Provider:   Floyd Baker MD; Catalog Code:   raNITIdine ; Order Dt/Tm:   6/13/2019 4:10:35 PM          SUMAtriptan  :   SUMAtriptan ; Status:   Prescribed ; Ordered As Mnemonic:   SUMAtriptan 100 mg oral tablet ; Simple Display Line:   100 mg, 1 tab(s), PO, Once, PRN: for migraine headache, 9 tab(s), 5 Refill(s) ; Ordering  Provider:   Floyd Baker MD; Catalog Code:   SUMAtriptan ; Order Dt/Tm:   6/13/2019 4:10:27 PM          Testosterone Cypionate 200 mg/mL intramuscular solution  :   Testosterone Cypionate 200 mg/mL intramuscular solution ; Status:   Prescribed ; Ordered As Mnemonic:   Testosterone Cypionate 200 mg/mL intramuscular solution ; Simple Display Line:   See Instructions, Inject intramuscular half (1/2) milliliter(ML) TWICE WEEKLY MONDAY/FRIDAY, 10 mL ; Ordering Provider:   Floyd Baker MD; Catalog Code:   testosterone ; Order Dt/Tm:   7/23/2019 12:25:03 PM

## 2022-02-15 NOTE — TELEPHONE ENCOUNTER
---------------------  From: Crissy Kim CMA (eRx Pool ([id number]_[facility]))   To: Floyd Baker MD;     Sent: 2/25/2021 1:34:31 PM CST  Subject: FW: Medication Management   Due Date/Time: 2/26/2021 10:24:00 AM CST           ------------------------------------------  From: [facility]  To: Floyd Baker MD  Sent: February 25, 2021 10:24:58 AM CST  Subject: Medication Management  Due: February 19, 2021 9:56:32 PM CST     ** On Hold Pending Signature **     Drug: oxyCODONE (oxyCODONE 10 mg oral tablet), ONE (1) TAB(S) ORAL FOUR TIMES DAILY  Quantity: 120 tab(s)  Days Supply: 30  Refills: 0  Substitutions Allowed  Notes from Pharmacy:     Dispensed Drug: oxyCODONE (oxyCODONE 10 mg oral tablet), ONE (1) TAB(S) ORAL FOUR TIMES DAILY  Quantity: 120 tab(s)  Days Supply: 30  Refills: 0  Substitutions Allowed  Notes from Pharmacy:  ------------------------------------------

## 2022-02-15 NOTE — NURSING NOTE
Diabetes Eye Testing Entered On:  1/28/2020 8:25 AM CST    Performed On:  1/27/2020 8:25 AM CST by Shalonda Hill               Diabetes Eye Testing   Retinopathy Present TR :   Shalonda Cohn - 1/28/2020 8:25 AM CST

## 2022-02-15 NOTE — NURSING NOTE
Comprehensive Intake Entered On:  10/21/2020 9:38 AM CDT    Performed On:  10/21/2020 9:37 AM CDT by Crissy Kim CMA               Summary   Chief Complaint :   consent for video visit for med ck   Height Measured :   70 in(Converted to: 5 ft 10 in, 177.80 cm)    Julio Crissy LUCIA - 10/21/2020 9:37 AM CDT   Health Status   Allergies Verified? :   Yes   Medication History Verified? :   Yes   Medical History Verified? :   Yes   Tobacco Use? :   Former smoker   Crissy Kim CMA - 10/21/2020 9:37 AM CDT   Consents   Consent for Immunization Exchange :   Consent Granted   Consent for Immunizations to Providers :   Consent Granted   Crissy Kim CMA - 10/21/2020 9:37 AM CDT   Meds / Allergies   (As Of: 10/21/2020 9:38:33 AM CDT)   Allergies (Active)   doxycycline  Estimated Onset Date:   <not entered> 2013 ; Reactions:   Vomiting ; Created By:   Floyd Baker MD; Reaction Status:   Active ; Category:   Drug ; Substance:   doxycycline ; Type:   Intolerance ; Severity:   Severe ; Updated By:   Floyd Baker MD; Reviewed Date:   10/21/2020 9:38 AM CDT        Medication List   (As Of: 10/21/2020 9:38:33 AM CDT)   Prescription/Discharge Order    amitriptyline  :   amitriptyline ; Status:   Prescribed ; Ordered As Mnemonic:   amitriptyline 25 mg oral tablet ; Simple Display Line:   25 mg, 1 tab(s), PO, hs, 90 tab(s), 1 Refill(s) ; Ordering Provider:   Floyd Baker MD; Catalog Code:   amitriptyline ; Order Dt/Tm:   6/13/2019 4:10:00 PM CDT          atorvastatin  :   atorvastatin ; Status:   Prescribed ; Ordered As Mnemonic:   atorvastatin 20 mg oral tablet ; Simple Display Line:   20 mg, 1 tab(s), PO, Daily, 90 tab(s), 1 Refill(s) ; Ordering Provider:   Floyd Baker MD; Catalog Code:   atorvastatin ; Order Dt/Tm:   7/15/2020 12:34:16 PM CDT          DULoxetine  :   DULoxetine ; Status:   Prescribed ; Ordered As Mnemonic:   DULoxetine 60 mg oral delayed release capsule ; Simple Display Line:   120 mg, 2  cap(s), Oral, daily, 180 cap(s), 0 Refill(s) ; Ordering Provider:   Floyd Baker MD; Catalog Code:   DULoxetine ; Order Dt/Tm:   8/10/2020 10:23:52 AM CDT          levothyroxine  :   levothyroxine ; Status:   Prescribed ; Ordered As Mnemonic:   levothyroxine 25 mcg (0.025 mg) oral tablet ; Simple Display Line:   25 mcg, 1 tab(s), Oral, daily, 90 tab(s), 1 Refill(s) ; Ordering Provider:   Floyd Baker MD; Catalog Code:   levothyroxine ; Order Dt/Tm:   8/12/2020 9:33:04 AM CDT          metFORMIN  :   metFORMIN ; Status:   Prescribed ; Ordered As Mnemonic:   metFORMIN 500 mg oral tablet ; Simple Display Line:   1 tab(s), Oral, bid, WM., 180 tab(s), 1 Refill(s) ; Ordering Provider:   Floyd Baker MD; Catalog Code:   metFORMIN ; Order Dt/Tm:   9/14/2020 9:31:15 AM CDT          oxyCODONE  :   oxyCODONE ; Status:   Prescribed ; Ordered As Mnemonic:   oxyCODONE 10 mg oral tablet ; Simple Display Line:   1 tab(s), Oral, qid, 120 EA, 0 Refill(s) ; Ordering Provider:   Floyd Baker MD; Catalog Code:   oxyCODONE ; Order Dt/Tm:   10/6/2020 12:26:40 PM CDT          SUMAtriptan  :   SUMAtriptan ; Status:   Prescribed ; Ordered As Mnemonic:   SUMAtriptan 100 mg oral tablet ; Simple Display Line:   100 mg, 1 tab(s), PO, Once, PRN: for migraine headache, 9 tab(s), 5 Refill(s) ; Ordering Provider:   Floyd Baker MD; Catalog Code:   SUMAtriptan ; Order Dt/Tm:   6/13/2019 4:10:27 PM CDT          tadalafil  :   tadalafil ; Status:   Prescribed ; Ordered As Mnemonic:   tadalafil 10 mg oral tablet ; Simple Display Line:   See Instructions, 1 tab(s) Oral daily PRN planned sexual activity, 10 tab(s), 2 Refill(s) ; Ordering Provider:   Floyd Baker MD; Catalog Code:   tadalafil ; Order Dt/Tm:   11/6/2019 1:55:43 PM CST          testosterone  :   testosterone ; Status:   Prescribed ; Ordered As Mnemonic:   Testosterone Cypionate 200 mg/mL intramuscular solution ; Simple Display Line:   See Instructions, Inject intramuscular half  (1/2) milliliter(ML) TWICE WEEKLY MONDAY/FRIDAY, 10 mL, 1 Refill(s) ; Ordering Provider:   Floyd Baker MD; Catalog Code:   testosterone ; Order Dt/Tm:   9/23/2020 2:07:24 PM CDT          tiZANidine  :   tiZANidine ; Status:   Prescribed ; Ordered As Mnemonic:   tiZANidine 4 mg oral tablet ; Simple Display Line:   4 mg, 1 tab(s), Oral, bid, 60 tab(s), 0 Refill(s) ; Ordering Provider:   Floyd Baker MD; Catalog Code:   tiZANidine ; Order Dt/Tm:   8/12/2020 9:29:25 AM CDT            ID Risk Screen   Recent Travel History :   No recent travel   Family Member Travel History :   No recent travel   Other Exposure to Infectious Disease :   Unknown   Crissy Kim CMA - 10/21/2020 9:37 AM CDT

## 2022-02-15 NOTE — TELEPHONE ENCOUNTER
---------------------  From: Alondra Flowers CMA (Phone Messages Pool ([id number]_[facility]))   To: Ryne Knowles MD;     Sent: 11/5/2020 4:06:30 PM CST  Subject: Oxycodone refill     Phone message    PCP: LYDIA HAZEL until 11/9/20    Person calling: Sreedhar  Phone number: [phone]  Time message left: [phone]  Return call time: _    Reason: Sreedhar called and left a message requesting a refill on his oxycodone. Please advise on fill.   10/6//2020 oxyCODONE 10 mg oral tablet: = 1 tab(s), Oral, qid, # 120 EA, 0 Refill(s), [facility]    LOV: 10/21/20 video visit with LYDIA      Transferred to:  PAUL---------------------  From: Ryne Knowles MD   To: Phone Messages [employer] ([id number] [facility] [place]);     Sent: 11/5/2020 5:08:45 PM CST  Subject: RE: Oxycodone refill     1 month eRx for oxycodone sent to pharmacy.  Further refills should be directed to WILmessage left on personalized voicemail Rx at pharmacy. Further refills via LYDIA as below.